# Patient Record
Sex: MALE | Race: ASIAN | NOT HISPANIC OR LATINO | Employment: FULL TIME | URBAN - METROPOLITAN AREA
[De-identification: names, ages, dates, MRNs, and addresses within clinical notes are randomized per-mention and may not be internally consistent; named-entity substitution may affect disease eponyms.]

---

## 2017-08-01 DIAGNOSIS — B17.0: ICD-10-CM

## 2017-08-01 LAB
AFP SERPL-MCNC: 7.4 UG/L (ref 0–8)
BASOPHILS # BLD AUTO: 0 10E9/L (ref 0–0.2)
BASOPHILS NFR BLD AUTO: 0.5 %
DIFFERENTIAL METHOD BLD: ABNORMAL
EOSINOPHIL # BLD AUTO: 0.1 10E9/L (ref 0–0.7)
EOSINOPHIL NFR BLD AUTO: 2.8 %
ERYTHROCYTE [DISTWIDTH] IN BLOOD BY AUTOMATED COUNT: 12.4 % (ref 10–15)
HCT VFR BLD AUTO: 36.8 % (ref 40–53)
HGB BLD-MCNC: 13.1 G/DL (ref 13.3–17.7)
INR PPP: 1.07 (ref 0.86–1.14)
LYMPHOCYTES # BLD AUTO: 1 10E9/L (ref 0.8–5.3)
LYMPHOCYTES NFR BLD AUTO: 23.6 %
MCH RBC QN AUTO: 32.2 PG (ref 26.5–33)
MCHC RBC AUTO-ENTMCNC: 35.6 G/DL (ref 31.5–36.5)
MCV RBC AUTO: 90 FL (ref 78–100)
MONOCYTES # BLD AUTO: 0.4 10E9/L (ref 0–1.3)
MONOCYTES NFR BLD AUTO: 8.9 %
NEUTROPHILS # BLD AUTO: 2.8 10E9/L (ref 1.6–8.3)
NEUTROPHILS NFR BLD AUTO: 64.2 %
PLATELET # BLD AUTO: 149 10E9/L (ref 150–450)
RBC # BLD AUTO: 4.07 10E12/L (ref 4.4–5.9)
WBC # BLD AUTO: 4.3 10E9/L (ref 4–11)

## 2017-08-01 PROCEDURE — 82105 ALPHA-FETOPROTEIN SERUM: CPT | Performed by: INTERNAL MEDICINE

## 2017-08-01 PROCEDURE — 85610 PROTHROMBIN TIME: CPT | Performed by: INTERNAL MEDICINE

## 2017-08-01 PROCEDURE — 80076 HEPATIC FUNCTION PANEL: CPT | Performed by: INTERNAL MEDICINE

## 2017-08-01 PROCEDURE — 36415 COLL VENOUS BLD VENIPUNCTURE: CPT | Performed by: INTERNAL MEDICINE

## 2017-08-01 PROCEDURE — 85025 COMPLETE CBC W/AUTO DIFF WBC: CPT | Performed by: INTERNAL MEDICINE

## 2017-08-01 PROCEDURE — 80048 BASIC METABOLIC PNL TOTAL CA: CPT | Performed by: INTERNAL MEDICINE

## 2017-08-01 PROCEDURE — 87517 HEPATITIS B DNA QUANT: CPT | Performed by: INTERNAL MEDICINE

## 2017-08-02 LAB
ALBUMIN SERPL-MCNC: 4 G/DL (ref 3.4–5)
ALP SERPL-CCNC: 66 U/L (ref 40–150)
ALT SERPL W P-5'-P-CCNC: 30 U/L (ref 0–70)
ANION GAP SERPL CALCULATED.3IONS-SCNC: 7 MMOL/L (ref 3–14)
AST SERPL W P-5'-P-CCNC: 19 U/L (ref 0–45)
BILIRUB DIRECT SERPL-MCNC: 0.2 MG/DL (ref 0–0.2)
BILIRUB SERPL-MCNC: 1.2 MG/DL (ref 0.2–1.3)
BUN SERPL-MCNC: 15 MG/DL (ref 7–30)
CALCIUM SERPL-MCNC: 8.6 MG/DL (ref 8.5–10.1)
CHLORIDE SERPL-SCNC: 107 MMOL/L (ref 94–109)
CO2 SERPL-SCNC: 27 MMOL/L (ref 20–32)
CREAT SERPL-MCNC: 0.92 MG/DL (ref 0.66–1.25)
GFR SERPL CREATININE-BSD FRML MDRD: 84 ML/MIN/1.7M2
GLUCOSE SERPL-MCNC: 101 MG/DL (ref 70–99)
POTASSIUM SERPL-SCNC: 3.3 MMOL/L (ref 3.4–5.3)
PROT SERPL-MCNC: 7.2 G/DL (ref 6.8–8.8)
SODIUM SERPL-SCNC: 141 MMOL/L (ref 133–144)

## 2017-08-04 ENCOUNTER — HOSPITAL ENCOUNTER (OUTPATIENT)
Dept: ULTRASOUND IMAGING | Facility: CLINIC | Age: 62
Discharge: HOME | End: 2017-08-04
Attending: NURSE PRACTITIONER | Admitting: NURSE PRACTITIONER
Payer: COMMERCIAL

## 2017-08-04 DIAGNOSIS — B19.10 VIRAL HEPATITIS B WITHOUT HEPATIC COMA: ICD-10-CM

## 2017-08-04 LAB
HBV DNA SERPL NAA+PROBE-ACNC: 318 [IU]/ML
HBV DNA SERPL NAA+PROBE-LOG IU: 2.5 {LOG_IU}/ML

## 2017-08-04 PROCEDURE — 76700 US EXAM ABDOM COMPLETE: CPT

## 2017-10-09 ENCOUNTER — OFFICE VISIT (OUTPATIENT)
Dept: OPTOMETRY | Facility: CLINIC | Age: 62
End: 2017-10-09
Payer: COMMERCIAL

## 2017-10-09 ENCOUNTER — MEDICAL CORRESPONDENCE (OUTPATIENT)
Dept: HEALTH INFORMATION MANAGEMENT | Facility: CLINIC | Age: 62
End: 2017-10-09

## 2017-10-09 DIAGNOSIS — H02.836 DERMATOCHALASIS OF BOTH EYELIDS: Primary | ICD-10-CM

## 2017-10-09 DIAGNOSIS — H52.4 PRESBYOPIA: ICD-10-CM

## 2017-10-09 DIAGNOSIS — H02.833 DERMATOCHALASIS OF BOTH EYELIDS: Primary | ICD-10-CM

## 2017-10-09 DIAGNOSIS — H25.13 NUCLEAR SCLEROSIS OF BOTH EYES: ICD-10-CM

## 2017-10-09 DIAGNOSIS — H52.203 HYPEROPIA OF BOTH EYES WITH ASTIGMATISM: ICD-10-CM

## 2017-10-09 DIAGNOSIS — H52.03 HYPEROPIA OF BOTH EYES WITH ASTIGMATISM: ICD-10-CM

## 2017-10-09 PROBLEM — M19.90 OSTEOARTHROSIS: Status: ACTIVE | Noted: 2017-10-09

## 2017-10-09 PROBLEM — N40.0 BENIGN PROSTATIC HYPERPLASIA: Status: ACTIVE | Noted: 2017-10-09

## 2017-10-09 PROBLEM — K21.9 ESOPHAGEAL REFLUX: Status: ACTIVE | Noted: 2017-10-09

## 2017-10-09 PROBLEM — K75.9 INFLAMMATORY LIVER DISEASE: Status: ACTIVE | Noted: 2017-10-09

## 2017-10-09 PROBLEM — E78.5 HYPERLIPIDEMIA: Status: ACTIVE | Noted: 2017-10-09

## 2017-10-09 PROBLEM — J18.9 PNEUMONIA: Status: ACTIVE | Noted: 2017-10-09

## 2017-10-09 PROBLEM — R39.15 URGENCY OF URINATION: Status: ACTIVE | Noted: 2017-10-09

## 2017-10-09 PROCEDURE — 92004 COMPRE OPH EXAM NEW PT 1/>: CPT | Performed by: OPTOMETRIST

## 2017-10-09 PROCEDURE — 92015 DETERMINE REFRACTIVE STATE: CPT | Performed by: OPTOMETRIST

## 2017-10-09 RX ORDER — TENOFOVIR DISOPROXIL FUMARATE 300 MG/1
300 TABLET, FILM COATED ORAL
COMMUNITY
Start: 2017-04-11 | End: 2018-11-28

## 2017-10-09 RX ORDER — TENOFOVIR DISOPROXIL FUMARATE 300 MG
TABLET ORAL
COMMUNITY
Start: 2017-09-21 | End: 2017-10-09

## 2017-10-09 ASSESSMENT — EXTERNAL EXAM - LEFT EYE: OS_EXAM: NORMAL

## 2017-10-09 ASSESSMENT — TONOMETRY
OD_IOP_MMHG: 17
IOP_METHOD: APPLANATION
OS_IOP_MMHG: 17

## 2017-10-09 ASSESSMENT — REFRACTION_MANIFEST
OD_SPHERE: +1.50
OS_SPHERE: +2.50
OS_AXIS: 090
OD_ADD: +2.50
OS_ADD: +2.50
OD_AXIS: 057
OD_CYLINDER: +0.25
OD_CYLINDER: +0.75
OD_AXIS: 165
OS_CYLINDER: +0.50
OS_SPHERE: +0.50
OS_CYLINDER: +0.50
METHOD_AUTOREFRACTION: 1
OD_SPHERE: +1.25
OS_AXIS: 91

## 2017-10-09 ASSESSMENT — REFRACTION_WEARINGRX
OD_SPHERE: +1.50
OS_SPHERE: +1.50
SPECS_TYPE: OTC

## 2017-10-09 ASSESSMENT — VISUAL ACUITY
OD_SC+: +2
OD_SC: 20/40
OD_SC: 20/40
OS_SC: 20/40
OS_SC: 20/40
METHOD: SNELLEN - LINEAR

## 2017-10-09 ASSESSMENT — CONF VISUAL FIELD
OD_NORMAL: 1
OS_NORMAL: 1

## 2017-10-09 ASSESSMENT — CUP TO DISC RATIO
OD_RATIO: 0.55
OS_RATIO: 0.5

## 2017-10-09 ASSESSMENT — EXTERNAL EXAM - RIGHT EYE: OD_EXAM: NORMAL

## 2017-10-09 NOTE — PROGRESS NOTES
Chief Complaint   Patient presents with     COMPREHENSIVE EYE EXAM     OD Eyelid drooping, dry and itchy eyes      Got a bifocal after last visit and did not wear, line was in his view  Not wearing any correction for distance and minimal near power  Eyelids are droopy especially right eye , he feels they are interfering with vision   Last Eye Exam: 1 yr  Dilated Previously: Yes    What are you currently using to see?  does not use glasses or contacts       Distance Vision Acuity: Satisfied with vision    Near Vision Acuity: Satisfied with vision while reading  with readers    Eye Comfort: dry  Do you use eye drops? : No  Occupation or Hobbies: Computer              Medical, surgical and family histories reviewed and updated 10/9/2017.       OBJECTIVE: See Ophthalmology exam    ASSESSMENT:    ICD-10-CM    1. Dermatochalasis of both eyelids H02.833 EYE EXAM (SIMPLE-NONBILLABLE)    H02.836 OPHTHALMOLOGY ADULT REFERRAL   2. Hyperopia of both eyes with astigmatism H52.03 REFRACTION    H52.203 EYE EXAM (SIMPLE-NONBILLABLE)   3. Presbyopia H52.4 REFRACTION   4. Nuclear sclerosis of both eyes H25.13 EYE EXAM (SIMPLE-NONBILLABLE)    lid position is pretty symmetrical     PLAN:   Refer to [facility] for blepharoplasty consult  Consider distance only prescription, if does not like bifocal     Mildred Wadsworth OD

## 2017-10-09 NOTE — MR AVS SNAPSHOT
After Visit Summary   10/9/2017    Sadie Olea    MRN: [id number]           Patient Information     Date Of Birth          1955        Visit Information        Provider Department      10/9/2017 9:40 AM Mildred Wadsworth OD [facility] [place]        Today's Diagnoses     Dermatochalasis of both eyelids    -  1    Hyperopia of both eyes with astigmatism        Presbyopia        Nuclear sclerosis of both eyes          Care Instructions    [facility]  [address] [place] [address]  [phone]      Make appointment for blepharoplasty consult  Consider distance only prescription for driving if you do not like the bifocal          Follow-ups after your visit        Additional Services     OPHTHALMOLOGY ADULT REFERRAL       Your provider has referred you to:  N: [facility] RAJENDRA [place] [phone]   [email or web address]      Please be aware that coverage of these services is subject to the terms and limitations of your health insurance plan.  Call member services at your health plan with any benefit or coverage questions.      Please bring the following to your appointment:  >>   Any x-rays, CTs or MRIs which have been performed.  Contact the facility where they were done to arrange for  prior to your scheduled appointment.  Any new CT, MRI or other procedures ordered by your specialist must be performed at a [facility] facility or coordinated by your clinic's referral office.    >>   List of current medications   >>   This referral request   >>   Any documents/labs given to you for this referral                  Follow-up notes from your care team     Return in about 1 year (around 10/9/2018) for Annual Visit.      Who to contact     If you have questions or need follow up information about today's clinic visit or your schedule please contact [facility] [place] directly at [phone].  Normal or non-critical lab and imaging  results will be communicated to you by MyChart, letter or phone within 4 business days after the clinic has received the results. If you do not hear from us within 7 days, please contact the clinic through [employer] or phone. If you have a critical or abnormal lab result, we will notify you by phone as soon as possible.  Submit refill requests through [employer] or call your pharmacy and they will forward the refill request to us. Please allow 3 business days for your refill to be completed.          Additional Information About Your Visit        [employer]arest[employer] Information     [employer] gives you secure access to your electronic health record. If you see a primary care provider, you can also send messages to your care team and make appointments. If you have questions, please call your primary care clinic.  If you do not have a primary care provider, please call [phone] and they will assist you.        Care EveryWhere ID     This is your Care EveryWhere ID. This could be used by other organizations to access your [facility] medical records  [id number]         Blood Pressure from Last 3 Encounters:   No data found for BP    Weight from Last 3 Encounters:   No data found for Wt              We Performed the Following     EYE EXAM (SIMPLE-NONBILLABLE)     OPHTHALMOLOGY ADULT REFERRAL     REFRACTION        Primary Care Provider Office Phone # Fax #    Jane Mcbride [phone]       [facility] [address]  [place] [address]        Equal Access to Services     MIRI XIAO : Hadii aad ku hadasho Soomaali, waaxda luqadaha, qaybta kaalmada adeegyada, waxyue maria. So [facility] [phone].    ATENCIÓN: Si habla español, tiene a christian disposición servicios gratuitos de asistencia lingüística. Llame al [phone].    We comply with applicable federal civil rights laws and [place] laws. We do not discriminate on the basis of race, color, national origin, age,  disability, sex, sexual orientation, or gender identity.            Thank you!     Thank you for choosing [facility] [place]  for your care. Our goal is always to provide you with excellent care. Hearing back from our patients is one way we can continue to improve our services. Please take a few minutes to complete the written survey that you may receive in the mail after your visit with us. Thank you!             Your Updated Medication List - Protect others around you: Learn how to safely use, store and throw away your medicines at www.disposemymeds.org.          This list is accurate as of: 10/9/17 10:18 AM.  Always use your most recent med list.                   Brand Name Dispense Instructions for use Diagnosis    tenofovir 300 MG tablet    VIREAD     Take 300 mg by mouth

## 2017-10-09 NOTE — PATIENT INSTRUCTIONS
[facility]  [address] [place] [address]  [phone]      Make appointment for blepharoplasty consult  Consider distance only prescription for driving if you do not like the bifocal

## 2017-10-12 ENCOUNTER — TELEPHONE (OUTPATIENT)
Dept: OPTOMETRY | Facility: CLINIC | Age: 62
End: 2017-10-12

## 2017-10-12 NOTE — TELEPHONE ENCOUNTER
Reason for call:  Other   Patient called regarding (reason for call): call back  Additional comments: patient spoke with his insurance provider and the insurance company needs the codes to verify if he will be covered or not by his insurance for the dermatochalasis      Phone number to reach patient:  Home number on file [phone] (home)    Best Time:  any    Can we leave a detailed message on this number?  YES

## 2018-02-20 DIAGNOSIS — B18.1 CHRONIC HEPATITIS B WITHOUT DELTA AGENT WITHOUT HEPATIC COMA (H): ICD-10-CM

## 2018-02-20 LAB
AFP SERPL-MCNC: 7 UG/L (ref 0–8)
APTT PPP: 27 SEC (ref 22–37)
ERYTHROCYTE [DISTWIDTH] IN BLOOD BY AUTOMATED COUNT: 12.7 % (ref 10–15)
HCT VFR BLD AUTO: 35.5 % (ref 40–53)
HGB BLD-MCNC: 12.5 G/DL (ref 13.3–17.7)
INR PPP: 0.99 (ref 0.86–1.14)
MCH RBC QN AUTO: 32.3 PG (ref 26.5–33)
MCHC RBC AUTO-ENTMCNC: 35.2 G/DL (ref 31.5–36.5)
MCV RBC AUTO: 92 FL (ref 78–100)
PLATELET # BLD AUTO: 142 10E9/L (ref 150–450)
RBC # BLD AUTO: 3.87 10E12/L (ref 4.4–5.9)
WBC # BLD AUTO: 5.2 10E9/L (ref 4–11)

## 2018-02-20 PROCEDURE — 82105 ALPHA-FETOPROTEIN SERUM: CPT | Performed by: INTERNAL MEDICINE

## 2018-02-20 PROCEDURE — 85610 PROTHROMBIN TIME: CPT | Performed by: INTERNAL MEDICINE

## 2018-02-20 PROCEDURE — 80076 HEPATIC FUNCTION PANEL: CPT | Performed by: INTERNAL MEDICINE

## 2018-02-20 PROCEDURE — 85027 COMPLETE CBC AUTOMATED: CPT | Performed by: INTERNAL MEDICINE

## 2018-02-20 PROCEDURE — 87517 HEPATITIS B DNA QUANT: CPT | Performed by: INTERNAL MEDICINE

## 2018-02-20 PROCEDURE — 85730 THROMBOPLASTIN TIME PARTIAL: CPT | Performed by: INTERNAL MEDICINE

## 2018-02-20 PROCEDURE — 36415 COLL VENOUS BLD VENIPUNCTURE: CPT | Performed by: INTERNAL MEDICINE

## 2018-02-21 LAB
ALBUMIN SERPL-MCNC: 3.7 G/DL (ref 3.4–5)
ALP SERPL-CCNC: 49 U/L (ref 40–150)
ALT SERPL W P-5'-P-CCNC: 27 U/L (ref 0–70)
AST SERPL W P-5'-P-CCNC: 19 U/L (ref 0–45)
BILIRUB DIRECT SERPL-MCNC: 0.2 MG/DL (ref 0–0.2)
BILIRUB SERPL-MCNC: 0.7 MG/DL (ref 0.2–1.3)
PROT SERPL-MCNC: 7 G/DL (ref 6.8–8.8)

## 2018-02-23 LAB
HBV DNA SERPL NAA+PROBE-ACNC: 1966 [IU]/ML
HBV DNA SERPL NAA+PROBE-LOG IU: 3.3 {LOG_IU}/ML

## 2018-05-24 ENCOUNTER — HOSPITAL ENCOUNTER (OUTPATIENT)
Dept: ULTRASOUND IMAGING | Facility: CLINIC | Age: 63
Discharge: HOME | End: 2018-05-24
Attending: NURSE PRACTITIONER | Admitting: NURSE PRACTITIONER
Payer: COMMERCIAL

## 2018-05-24 DIAGNOSIS — B18.1 CHRONIC VIRAL HEPATITIS B WITHOUT DELTA AGENT AND WITHOUT COMA (H): ICD-10-CM

## 2018-05-24 PROCEDURE — 76700 US EXAM ABDOM COMPLETE: CPT

## 2018-05-29 ENCOUNTER — NURSE TRIAGE (OUTPATIENT)
Dept: NURSING | Facility: CLINIC | Age: 63
End: 2018-05-29

## 2018-05-29 NOTE — TELEPHONE ENCOUNTER
Yulisa states that he is having difficulty getting his ultrasound results from 05/24/18 exam at [facility]. Reviewed in epic and not yet signed off, he has appointment tomorrow with provider and will ask him for the results and copy, he has tried my chart number but they referred him to [facility] twice and it appears Madhuri is inactive. Offered HIM phone number for [facility]. He agreed to check with provider tomorrow.  No triage needs.    Olga Vega, RN, BSN  [facility]

## 2018-07-20 ENCOUNTER — HOSPITAL ENCOUNTER (OUTPATIENT)
Dept: MRI IMAGING | Facility: CLINIC | Age: 63
Discharge: HOME | End: 2018-07-20
Attending: NURSE PRACTITIONER | Admitting: NURSE PRACTITIONER
Payer: COMMERCIAL

## 2018-07-20 DIAGNOSIS — B18.1 CHRONIC VIRAL HEPATITIS B WITHOUT DELTA AGENT AND WITHOUT COMA (H): ICD-10-CM

## 2018-07-20 DIAGNOSIS — B19.10 HEPATITIS B VIRUS INFECTION: Primary | ICD-10-CM

## 2018-07-20 PROCEDURE — 74183 MRI ABD W/O CNTR FLWD CNTR: CPT

## 2018-07-20 PROCEDURE — 25000128 H RX IP 250 OP 636: Performed by: RADIOLOGY

## 2018-07-20 PROCEDURE — A9585 GADOBUTROL INJECTION: HCPCS | Performed by: RADIOLOGY

## 2018-07-20 RX ORDER — GADOBUTROL 604.72 MG/ML
10 INJECTION INTRAVENOUS ONCE
Status: COMPLETED | OUTPATIENT
Start: 2018-07-20 | End: 2018-07-20

## 2018-07-20 RX ADMIN — GADOBUTROL 10 ML: 604.72 INJECTION INTRAVENOUS at 11:00

## 2018-08-14 DIAGNOSIS — B19.10 HEPATITIS B VIRUS INFECTION: ICD-10-CM

## 2018-08-14 LAB
ERYTHROCYTE [DISTWIDTH] IN BLOOD BY AUTOMATED COUNT: 12.6 % (ref 10–15)
HCT VFR BLD AUTO: 35 % (ref 40–53)
HGB BLD-MCNC: 12.5 G/DL (ref 13.3–17.7)
INR PPP: 1.05 (ref 0.86–1.14)
MCH RBC QN AUTO: 32.7 PG (ref 26.5–33)
MCHC RBC AUTO-ENTMCNC: 35.7 G/DL (ref 31.5–36.5)
MCV RBC AUTO: 92 FL (ref 78–100)
PLATELET # BLD AUTO: 158 10E9/L (ref 150–450)
RBC # BLD AUTO: 3.82 10E12/L (ref 4.4–5.9)
WBC # BLD AUTO: 4.3 10E9/L (ref 4–11)

## 2018-08-14 PROCEDURE — 36415 COLL VENOUS BLD VENIPUNCTURE: CPT | Performed by: INTERNAL MEDICINE

## 2018-08-14 PROCEDURE — 87517 HEPATITIS B DNA QUANT: CPT | Performed by: INTERNAL MEDICINE

## 2018-08-14 PROCEDURE — 80076 HEPATIC FUNCTION PANEL: CPT | Performed by: INTERNAL MEDICINE

## 2018-08-14 PROCEDURE — 85027 COMPLETE CBC AUTOMATED: CPT | Performed by: INTERNAL MEDICINE

## 2018-08-14 PROCEDURE — 85610 PROTHROMBIN TIME: CPT | Performed by: INTERNAL MEDICINE

## 2018-08-14 PROCEDURE — 80048 BASIC METABOLIC PNL TOTAL CA: CPT | Performed by: INTERNAL MEDICINE

## 2018-08-15 LAB
ALBUMIN SERPL-MCNC: 3.9 G/DL (ref 3.4–5)
ALP SERPL-CCNC: 39 U/L (ref 40–150)
ALT SERPL W P-5'-P-CCNC: 48 U/L (ref 0–70)
ANION GAP SERPL CALCULATED.3IONS-SCNC: 7 MMOL/L (ref 3–14)
AST SERPL W P-5'-P-CCNC: 28 U/L (ref 0–45)
BILIRUB DIRECT SERPL-MCNC: 0.2 MG/DL (ref 0–0.2)
BILIRUB SERPL-MCNC: 1 MG/DL (ref 0.2–1.3)
BUN SERPL-MCNC: 14 MG/DL (ref 7–30)
CALCIUM SERPL-MCNC: 8.4 MG/DL (ref 8.5–10.1)
CHLORIDE SERPL-SCNC: 108 MMOL/L (ref 94–109)
CO2 SERPL-SCNC: 27 MMOL/L (ref 20–32)
CREAT SERPL-MCNC: 0.79 MG/DL (ref 0.66–1.25)
GFR SERPL CREATININE-BSD FRML MDRD: >90 ML/MIN/1.7M2
GLUCOSE SERPL-MCNC: 129 MG/DL (ref 70–99)
POTASSIUM SERPL-SCNC: 3.6 MMOL/L (ref 3.4–5.3)
PROT SERPL-MCNC: 6.9 G/DL (ref 6.8–8.8)
SODIUM SERPL-SCNC: 142 MMOL/L (ref 133–144)

## 2018-08-17 LAB
HBV DNA SERPL NAA+PROBE-ACNC: 1137 [IU]/ML
HBV DNA SERPL NAA+PROBE-LOG IU: 3.1 {LOG_IU}/ML

## 2018-08-21 ENCOUNTER — TELEPHONE (OUTPATIENT)
Dept: GASTROENTEROLOGY | Facility: CLINIC | Age: 63
End: 2018-08-21

## 2018-08-21 NOTE — TELEPHONE ENCOUNTER
Patient contacted and reminded of upcoming appointment.  Patient confirmed they will be attending.  Patient instructed to bring updated medications list to appointment.  Instructed pt to arrive an hour and a half to two hours prior to appt time for labs.  Paul Pablo, CMA

## 2018-08-22 ENCOUNTER — OFFICE VISIT (OUTPATIENT)
Dept: GASTROENTEROLOGY | Facility: CLINIC | Age: 63
End: 2018-08-22
Attending: INTERNAL MEDICINE
Payer: COMMERCIAL

## 2018-08-22 VITALS
HEIGHT: 70 IN | SYSTOLIC BLOOD PRESSURE: 139 MMHG | OXYGEN SATURATION: 97 % | BODY MASS INDEX: 24.91 KG/M2 | WEIGHT: 174 LBS | TEMPERATURE: 97.7 F | HEART RATE: 58 BPM | DIASTOLIC BLOOD PRESSURE: 86 MMHG

## 2018-08-22 DIAGNOSIS — Z12.11 SPECIAL SCREENING FOR MALIGNANT NEOPLASMS, COLON: ICD-10-CM

## 2018-08-22 DIAGNOSIS — B18.1 CHRONIC VIRAL HEPATITIS B WITHOUT DELTA AGENT AND WITHOUT COMA (H): Primary | ICD-10-CM

## 2018-08-22 DIAGNOSIS — B18.1 CHRONIC VIRAL HEPATITIS B WITHOUT DELTA AGENT AND WITHOUT COMA (H): ICD-10-CM

## 2018-08-22 LAB — AFP SERPL-MCNC: 11.1 UG/L (ref 0–8)

## 2018-08-22 PROCEDURE — G0463 HOSPITAL OUTPT CLINIC VISIT: HCPCS | Mod: ZF

## 2018-08-22 PROCEDURE — 86707 HEPATITIS BE ANTIBODY: CPT | Mod: 90 | Performed by: INTERNAL MEDICINE

## 2018-08-22 PROCEDURE — 99000 SPECIMEN HANDLING OFFICE-LAB: CPT | Performed by: INTERNAL MEDICINE

## 2018-08-22 PROCEDURE — 82105 ALPHA-FETOPROTEIN SERUM: CPT | Performed by: INTERNAL MEDICINE

## 2018-08-22 PROCEDURE — 36415 COLL VENOUS BLD VENIPUNCTURE: CPT | Performed by: INTERNAL MEDICINE

## 2018-08-22 PROCEDURE — 87350 HEPATITIS BE AG IA: CPT | Mod: 90 | Performed by: INTERNAL MEDICINE

## 2018-08-22 ASSESSMENT — PAIN SCALES - GENERAL: PAINLEVEL: NO PAIN (0)

## 2018-08-22 NOTE — NURSING NOTE
Chief Complaint   Patient presents with     Consult     consult chronic viral hep B     Meg Del Cid MA

## 2018-08-22 NOTE — MR AVS SNAPSHOT
After Visit Summary   8/22/2018    Sadie Olea    MRN: [id number]           Patient Information     Date Of Birth          1955        Visit Information        Provider Department      8/22/2018 9:00 AM Nehemias Potts MD [facility]        Today's Diagnoses     Chronic viral hepatitis B without delta agent and without coma (H)    -  1    Special screening for malignant neoplasms, colon           Follow-ups after your visit        Follow-up notes from your care team     Return in about 3 months (around 11/22/2018).      Your next 10 appointments already scheduled     Feb 27, 2019 10:00 AM CST   (Arrive by 9:45 AM)   Return Visit with Nehemias Potts MD   [facility] ([facility] [place])    [address] [place] [address]  [address] [place] [id number] [phone]              Future tests that were ordered for you today     Open Future Orders        Priority Expected Expires Ordered    AFP tumor marker Routine  9/21/2018 8/22/2018    Hepatitis Be antibody Routine  9/21/2018 8/22/2018    Hepatitis Be antigen Routine  9/21/2018 8/22/2018            Who to contact     If you have questions or need follow up information about today's clinic visit or your schedule please contact [facility] directly at [phone].  Normal or non-critical lab and imaging results will be communicated to you by MyChart, letter or phone within 4 business days after the clinic has received the results. If you do not hear from us within 7 days, please contact the clinic through MyChart or phone. If you have a critical or abnormal lab result, we will notify you by phone as soon as possible.  Submit refill requests through [employer] or call your pharmacy and they will forward the refill request to us. Please allow 3 business days for your refill to be completed.          Additional Information About Your Visit        Nearboxhart Information     [employer] gives you  "secure access to your electronic health record. If you see a primary care provider, you can also send messages to your care team and make appointments. If you have questions, please call your primary care clinic.  If you do not have a primary care provider, please call [phone] and they will assist you.        Care EveryWhere ID     This is your Care EveryWhere ID. This could be used by other organizations to access your [facility] medical records  [id number]        Your Vitals Were     Pulse Temperature Height Pulse Oximetry BMI (Body Mass Index)       58 97.7  F (36.5  C) (Oral) 1.778 m (5' 10\") 97% 24.97 kg/m2        Blood Pressure from Last 3 Encounters:   08/22/18 139/86    Weight from Last 3 Encounters:   08/22/18 78.9 kg (174 lb)              We Performed the Following     C COLONOSCOPY, FLEX,  W ABLATION TUMOR/POLYP/LESION        Primary Care Provider Office Phone # Fax #    Jane Mcbride [phone]       [facility] [address]  [facility] [address]        Equal Access to Services     [facility]: Hadii aad ku hadasho Soomaali, waaxda luqadaha, qaybta kaalmada adeegyada, waxay demarin fidelina tim . So [facility] [phone].    ATENCIÓN: Si habla español, tiene a christian disposición servicios gratuitos de asistencia lingüística. [facility] [phone].    We comply with applicable federal civil rights laws and [place] laws. We do not discriminate on the basis of race, color, national origin, age, disability, sex, sexual orientation, or gender identity.            Thank you!     Thank you for choosing [facility]  for your care. Our goal is always to provide you with excellent care. Hearing back from our patients is one way we can continue to improve our services. Please take a few minutes to complete the written survey that you may receive in the mail after your visit with us. Thank you!             Your Updated Medication List - Protect others " around you: Learn how to safely use, store and throw away your medicines at www.disposemymeds.org.          This list is accurate as of 8/22/18 10:06 AM.  Always use your most recent med list.                   Brand Name Dispense Instructions for use Diagnosis    tenofovir 300 MG tablet    VIREAD     Take 300 mg by mouth

## 2018-08-22 NOTE — LETTER
8/22/2018      RE: Sadie Olea  [address]  [place] [address]       [facility]    Hepatology New Patient Visit    Referring provider:  Netta Kincaid    CHIEF COMPLAINT/REASON FOR VISIT:  Hepatitis B virus infection.      HISTORY OF PRESENT ILLNESS:  Mr. 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  He was previously followed and seen at [facility].  He was diagnosed with hepatitis B in 2009.  At that time he was not started on any treatment, as he was thought to be in the inactive phase of the disease.  Please note that the patient admitted that at age 42 back in [place], he did have jaundice.  He does not show any signs of chronic liver disease like edema or fluid retention.  Weight is stable.  He never had any GI bleed nor does he have any signs of confusion or memory issues.  He also denies any abdominal pain, nausea or vomiting.  He is moving his bowels regularly.  He never had a colonoscopy.     Medical hx Surgical hx   Past Medical History:   Diagnosis Date     GERD (gastroesophageal reflux disease)      HBV (hepatitis B virus) infection      Hyperlipidemia       No past surgical history on file.       Medications  Prior to Admission medications    Medication Sig Start Date End Date Taking? Authorizing Provider   tenofovir (VIREAD) 300 MG tablet Take 300 mg by mouth 4/11/17   Reported, Patient       Allergies  No Known Allergies    Family hx Social hx   Family History   Problem Relation Age of Onset     Glaucoma Father      Type 2 Diabetes Mother      Hepatitis Sister      Macular Degeneration No family hx of       Social History   Substance Use Topics     Smoking status: Never Smoker     Smokeless tobacco: Never Used     Alcohol use Not on file     He is  and has 5 children, who are 4 girls and 1 boy who are between 34-25 years of age.          REVIEW OF SYSTEMS:  Mr. Olea denies any recent infections.  Does not have  "any fatigue, headaches or seizures.  He denies any cough, shortness of breath or dyspnea on exertion.  No anemia or easy bruising.  He has no diabetes or thyroid issues.  He denies also any psychiatric problems.  He does not have any issues with hypothyroidism or diabetes.  He does not have any eye, hearing or skin issues.  Otherwise, a comprehensive review of systems was noncontributory.       Examination  /86  Pulse 58  Temp 97.7  F (36.5  C) (Oral)  Ht 1.778 m (5' 10\")  Wt 78.9 kg (174 lb)  SpO2 97%  BMI 24.97 kg/m2  Body mass index is 24.97 kg/(m^2).    Gen- well, NAD, A+Ox3, normal color  Eye- EOMI  ENT- MMM, normal oropharynx  Lym- no palpable lymphadenopathy  CVS- S1, S2 normal, no added sounds, RRR  RS- CTA  Abd- Not distended. No hepatosplenomegaly.  Extr- pulses good, no HECTOR  MS- hands normal- no clubbing  Neuro- A+Ox3, no asterixis  Skin- no rash or jaundice  Psych- normal mood    Laboratory  Lab Results   Component Value Date     08/14/2018    POTASSIUM 3.6 08/14/2018    CHLORIDE 108 08/14/2018    CO2 27 08/14/2018    BUN 14 08/14/2018    CR 0.79 08/14/2018       Lab Results   Component Value Date    BILITOTAL 1.0 08/14/2018    ALT 48 08/14/2018    AST 28 08/14/2018    ALKPHOS 39 08/14/2018       Lab Results   Component Value Date    ALBUMIN 3.9 08/14/2018    PROTTOTAL 6.9 08/14/2018        Lab Results   Component Value Date    WBC 4.3 08/14/2018    HGB 12.5 08/14/2018    MCV 92 08/14/2018     08/14/2018       Lab Results   Component Value Date    INR 1.05 08/14/2018         Radiology    Assessment and Plan:  Mr. Olea is a 63-year-old Tibetan immigrant with hepatitis B virus infection.  He did get diagnosed, according to him, in 2009, at which time looking back at notes from [facility], he did have a high viral load with E antigen positivity, and he was thought to be in the immune tolerant phase of the disease.  He was started on tenofovir; I am not quite sure about his " compliance as he has detectable virus now.  He does not show any signs of chronic liver disease.  He moved his health care as his insurance is acceptable to [place], but not to [facility], so he will be here from now on.  We will get an abdominal ultrasound, and we repeated his labs.  We will get, also, an E antibody, and we will get his alpha fetoproteins.  He had recently done an MRI and MRCP, which showed mild intra- and extrahepatic biliary dilation with no definite causes, and there was no choledocholithiasis.  At that point, his liver was also read as unremarkable, so he will be seen here in 6 months.  He will follow with his Primary Care physician for his other medical issues.  We will send him, also, for a colonoscopy.      This was a 45 minute visit, of which more than 50% was spent in explaining to the patient what our plan of care was.  We answered all his questions.      Nehemias Potts MD  Hepatology  [facility]      cc:   Jane Mcbride MD   [place] [phone]

## 2018-08-22 NOTE — PROGRESS NOTES
[facility]    Hepatology New Patient Visit    Referring provider:  Netta Kincaid    CHIEF COMPLAINT/REASON FOR VISIT:  Hepatitis B virus infection.      HISTORY OF PRESENT ILLNESS:  Mr. 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  He was previously followed and seen at [facility].  He was diagnosed with hepatitis B in 2009.  At that time he was not started on any treatment, as he was thought to be in the inactive phase of the disease.  Please note that the patient admitted that at age 42 back in [place], he did have jaundice.  He does not show any signs of chronic liver disease like edema or fluid retention.  Weight is stable.  He never had any GI bleed nor does he have any signs of confusion or memory issues.  He also denies any abdominal pain, nausea or vomiting.  He is moving his bowels regularly.  He never had a colonoscopy.     Medical hx Surgical hx   Past Medical History:   Diagnosis Date     GERD (gastroesophageal reflux disease)      HBV (hepatitis B virus) infection      Hyperlipidemia       No past surgical history on file.       Medications  Prior to Admission medications    Medication Sig Start Date End Date Taking? Authorizing Provider   tenofovir (VIREAD) 300 MG tablet Take 300 mg by mouth 4/11/17   Reported, Patient       Allergies  No Known Allergies    Family hx Social hx   Family History   Problem Relation Age of Onset     Glaucoma Father      Type 2 Diabetes Mother      Hepatitis Sister      Macular Degeneration No family hx of       Social History   Substance Use Topics     Smoking status: Never Smoker     Smokeless tobacco: Never Used     Alcohol use Not on file     He is  and has 5 children, who are 4 girls and 1 boy who are between 34-25 years of age.          REVIEW OF SYSTEMS:  Mr. Olea denies any recent infections.  Does not have any fatigue, headaches or seizures.  He denies any cough, shortness of breath  "or dyspnea on exertion.  No anemia or easy bruising.  He has no diabetes or thyroid issues.  He denies also any psychiatric problems.  He does not have any issues with hypothyroidism or diabetes.  He does not have any eye, hearing or skin issues.  Otherwise, a comprehensive review of systems was noncontributory.       Examination  /86  Pulse 58  Temp 97.7  F (36.5  C) (Oral)  Ht 1.778 m (5' 10\")  Wt 78.9 kg (174 lb)  SpO2 97%  BMI 24.97 kg/m2  Body mass index is 24.97 kg/(m^2).    Gen- well, NAD, A+Ox3, normal color  Eye- EOMI  ENT- MMM, normal oropharynx  Lym- no palpable lymphadenopathy  CVS- S1, S2 normal, no added sounds, RRR  RS- CTA  Abd- Not distended. No hepatosplenomegaly.  Extr- pulses good, no HECTOR  MS- hands normal- no clubbing  Neuro- A+Ox3, no asterixis  Skin- no rash or jaundice  Psych- normal mood    Laboratory  Lab Results   Component Value Date     08/14/2018    POTASSIUM 3.6 08/14/2018    CHLORIDE 108 08/14/2018    CO2 27 08/14/2018    BUN 14 08/14/2018    CR 0.79 08/14/2018       Lab Results   Component Value Date    BILITOTAL 1.0 08/14/2018    ALT 48 08/14/2018    AST 28 08/14/2018    ALKPHOS 39 08/14/2018       Lab Results   Component Value Date    ALBUMIN 3.9 08/14/2018    PROTTOTAL 6.9 08/14/2018        Lab Results   Component Value Date    WBC 4.3 08/14/2018    HGB 12.5 08/14/2018    MCV 92 08/14/2018     08/14/2018       Lab Results   Component Value Date    INR 1.05 08/14/2018         Radiology    Assessment and Plan:  Mr. Olea is a 63-year-old Tibetan immigrant with hepatitis B virus infection.  He did get diagnosed, according to him, in 2009, at which time looking back at notes from [facility], he did have a high viral load with E antigen positivity, and he was thought to be in the immune tolerant phase of the disease.  He was started on tenofovir; I am not quite sure about his compliance as he has detectable virus now.  He does not show any signs of " chronic liver disease.  He moved his health care as his insurance is acceptable to [place], so he will be here from now on.  We will get an abdominal ultrasound, and we repeated his labs.  We will get, also, an E antibody, and we will get his alpha fetoproteins.  He had recently done an MRI and MRCP, which showed mild intra- and extrahepatic biliary dilation with no definite causes, and there was no choledocholithiasis.  At that point, his liver was also read as unremarkable, so he will be seen here in 6 months.  He will follow with his Primary Care physician for his other medical issues.  We will send him, also, for a colonoscopy.      This was a 45 minute visit, of which more than 50% was spent in explaining to the patient what our plan of care was.  We answered all his questions.      cc:   Jane Mcbride MD   [facility]    [address] [place] [address]       Nehemias Potts MD  Hepatology  [facility]

## 2018-08-23 LAB
HBV E AB SERPL QL IA: NEGATIVE
HBV E AG SERPL QL IA: POSITIVE

## 2018-11-27 ENCOUNTER — TELEPHONE (OUTPATIENT)
Dept: GASTROENTEROLOGY | Facility: CLINIC | Age: 63
End: 2018-11-27

## 2018-11-27 DIAGNOSIS — B18.1 CHRONIC VIRAL HEPATITIS B WITHOUT DELTA AGENT AND WITHOUT COMA (H): Primary | ICD-10-CM

## 2018-11-27 NOTE — TELEPHONE ENCOUNTER
[facility] Call Center    Phone Message    May a detailed message be left on voicemail: yes    Reason for Call: Medication Question or concern regarding medication   Prescription Clarification  Name of Medication: Viread  Prescribing Provider: Delroy   Pharmacy:  mail order   What on the order needs clarification? Pt was on this med. Filled 4/11/17 but then changed to a [facility] who put him on something else. He is now requesting a new script for the Viread again per Estefania at  mail order because he has started to see Dr Potts again in Aug. Of 2018. You can call the pharmacy at [phone] if questions.           Action Taken: Message routed to:  [facility] ([facility] see above

## 2018-11-28 RX ORDER — TENOFOVIR DISOPROXIL FUMARATE 300 MG/1
300 TABLET, FILM COATED ORAL DAILY
Qty: 90 TABLET | Refills: 3 | Status: SHIPPED | OUTPATIENT
Start: 2018-11-28 | End: 2019-08-26

## 2018-12-18 DIAGNOSIS — B18.1 CHRONIC VIRAL HEPATITIS B WITHOUT DELTA AGENT AND WITHOUT COMA (H): Primary | ICD-10-CM

## 2018-12-23 ENCOUNTER — ANCILLARY PROCEDURE (OUTPATIENT)
Dept: GENERAL RADIOLOGY | Facility: CLINIC | Age: 63
End: 2018-12-23
Attending: FAMILY MEDICINE
Payer: COMMERCIAL

## 2018-12-23 ENCOUNTER — OFFICE VISIT (OUTPATIENT)
Dept: URGENT CARE | Facility: URGENT CARE | Age: 63
End: 2018-12-23
Payer: COMMERCIAL

## 2018-12-23 VITALS
SYSTOLIC BLOOD PRESSURE: 150 MMHG | HEART RATE: 56 BPM | RESPIRATION RATE: 12 BRPM | TEMPERATURE: 97.3 F | OXYGEN SATURATION: 100 % | DIASTOLIC BLOOD PRESSURE: 86 MMHG

## 2018-12-23 DIAGNOSIS — R07.89 CHEST HEAVINESS: ICD-10-CM

## 2018-12-23 DIAGNOSIS — B18.1 CHRONIC VIRAL HEPATITIS B WITHOUT DELTA AGENT AND WITHOUT COMA (H): ICD-10-CM

## 2018-12-23 DIAGNOSIS — R09.89 PHLEGM IN THROAT: ICD-10-CM

## 2018-12-23 DIAGNOSIS — R07.89 CHEST HEAVINESS: Primary | ICD-10-CM

## 2018-12-23 PROCEDURE — 99204 OFFICE O/P NEW MOD 45 MIN: CPT | Performed by: FAMILY MEDICINE

## 2018-12-23 PROCEDURE — 71046 X-RAY EXAM CHEST 2 VIEWS: CPT

## 2018-12-23 NOTE — PROGRESS NOTES
SUBJECTIVE:   Sadie Olea is a 63 year old male presenting with a chief complaint of chest congestion and large amounts of mucus (thick white mucus from the throat) Not much cough.  .No shortness of breath.    Onset of symptoms was three months ago.  Course of illness is about the same.  .      Current and Associated symptoms: no stuffy nose.    Treatment measures tried include Nyquil.  Predisposing factors include:  None.    Patient does not smoke.  .    Past Medical History:   Diagnosis Date     GERD (gastroesophageal reflux disease)      HBV (hepatitis B virus) infection      Hyperlipidemia      Current Outpatient Medications   Medication Sig Dispense Refill     tenofovir (VIREAD) 300 MG tablet Take 1 tablet (300 mg) by mouth daily 90 tablet 3     Social History     Tobacco Use     Smoking status: Never Smoker     Smokeless tobacco: Never Used   Substance Use Topics     Alcohol use: Not on file     Family History:  No major medical problems.       ROS:  CONSTITUTIONAL:NEGATIVE for fever, chills, change in weight  INTEGUMENTARY/SKIN: NEGATIVE for worrisome rashes, moles or lesions  EYES: NEGATIVE for vision changes or irritation  ENT/MOUTH: POSITIVE for phlegm   RESP:POSITIVE for chest heaviness.    CV: NEGATIVE for chest pain, palpitations or peripheral edema  GI: NEGATIVE for nausea, abdominal pain, heartburn, or change in bowel habits  : negative for dysuria, hematuria, decreased urinary stream, MUSCULOSKELETAL: NEGATIVE for significant arthralgias or myalgia  HEME/ALLERGY/IMMUNE: NEGATIVE for bleeding problems  PSYCHIATRIC: NEGATIVE for changes in mood or affect    OBJECTIVE:  /86   Pulse 56   Temp 97.3  F (36.3  C) (Tympanic)   Resp 12   SpO2 100%   GENERAL APPEARANCE: healthy, alert and no distress  HENT: ear canals and TM's normal.  Nose has moderate edema at the turbinates and mouth without ulcers, erythema or lesions  NECK: supple, nontender, no lymphadenopathy  RESP: lungs clear to  auscultation - no rales, rhonchi or wheezes  CV: regular rates and rhythm, normal S1 S2, no murmur noted  SKIN: no suspicious lesions or rashes    chest x-ray:  I viewed the chest x-ray images.  There is a small, nodule-like lesion in the right upper lobe region.  Radiology report is pending.     ASSESSMENT:  Chest heaviness  Phlegm, unsure if the source is from the lungs or from the sinuses.        PLAN:  Follow up with the primary care provider for further evaluation.  Rx:  Augmentin to cover for sinusitis as a cause of the phlegm.      Drink plenty of liquids    Take Mucinex to thin the phlegm    The radiology report is pending regarding the nodule-like lesion in the right upper lobe region.          Bill Torre MD

## 2018-12-26 DIAGNOSIS — B18.1 CHRONIC VIRAL HEPATITIS B WITHOUT DELTA AGENT AND WITHOUT COMA (H): ICD-10-CM

## 2018-12-26 LAB
ERYTHROCYTE [DISTWIDTH] IN BLOOD BY AUTOMATED COUNT: 12.6 % (ref 10–15)
HCT VFR BLD AUTO: 36.2 % (ref 40–53)
HGB BLD-MCNC: 13 G/DL (ref 13.3–17.7)
INR PPP: 1.07 (ref 0.86–1.14)
MCH RBC QN AUTO: 31.9 PG (ref 26.5–33)
MCHC RBC AUTO-ENTMCNC: 35.9 G/DL (ref 31.5–36.5)
MCV RBC AUTO: 89 FL (ref 78–100)
PLATELET # BLD AUTO: 149 10E9/L (ref 150–450)
RBC # BLD AUTO: 4.07 10E12/L (ref 4.4–5.9)
WBC # BLD AUTO: 4.4 10E9/L (ref 4–11)

## 2018-12-26 PROCEDURE — 85610 PROTHROMBIN TIME: CPT | Performed by: INTERNAL MEDICINE

## 2018-12-26 PROCEDURE — 80076 HEPATIC FUNCTION PANEL: CPT | Performed by: INTERNAL MEDICINE

## 2018-12-26 PROCEDURE — 87517 HEPATITIS B DNA QUANT: CPT | Performed by: INTERNAL MEDICINE

## 2018-12-26 PROCEDURE — 80048 BASIC METABOLIC PNL TOTAL CA: CPT | Performed by: INTERNAL MEDICINE

## 2018-12-26 PROCEDURE — 85027 COMPLETE CBC AUTOMATED: CPT | Performed by: INTERNAL MEDICINE

## 2018-12-26 PROCEDURE — 36415 COLL VENOUS BLD VENIPUNCTURE: CPT | Performed by: INTERNAL MEDICINE

## 2018-12-27 LAB
ALBUMIN SERPL-MCNC: 3.8 G/DL (ref 3.4–5)
ALP SERPL-CCNC: 41 U/L (ref 40–150)
ALT SERPL W P-5'-P-CCNC: 43 U/L (ref 0–70)
ANION GAP SERPL CALCULATED.3IONS-SCNC: 6 MMOL/L (ref 3–14)
AST SERPL W P-5'-P-CCNC: 25 U/L (ref 0–45)
BILIRUB DIRECT SERPL-MCNC: 0.2 MG/DL (ref 0–0.2)
BILIRUB SERPL-MCNC: 0.8 MG/DL (ref 0.2–1.3)
BUN SERPL-MCNC: 9 MG/DL (ref 7–30)
CALCIUM SERPL-MCNC: 8.7 MG/DL (ref 8.5–10.1)
CHLORIDE SERPL-SCNC: 106 MMOL/L (ref 94–109)
CO2 SERPL-SCNC: 27 MMOL/L (ref 20–32)
CREAT SERPL-MCNC: 0.84 MG/DL (ref 0.66–1.25)
GFR SERPL CREATININE-BSD FRML MDRD: >90 ML/MIN/{1.73_M2}
GLUCOSE SERPL-MCNC: 129 MG/DL (ref 70–99)
POTASSIUM SERPL-SCNC: 3.4 MMOL/L (ref 3.4–5.3)
PROT SERPL-MCNC: 7.2 G/DL (ref 6.8–8.8)
SODIUM SERPL-SCNC: 139 MMOL/L (ref 133–144)

## 2018-12-28 LAB
HBV DNA SERPL NAA+PROBE-ACNC: ABNORMAL [IU]/ML
HBV DNA SERPL NAA+PROBE-LOG IU: 8 {LOG_IU}/ML

## 2019-01-02 ENCOUNTER — TELEPHONE (OUTPATIENT)
Dept: GASTROENTEROLOGY | Facility: CLINIC | Age: 64
End: 2019-01-02

## 2019-01-02 NOTE — TELEPHONE ENCOUNTER
Called patient to inform him of his elevated Hepatitis B viral load.  Patient stated that he was not able to take his medication for a few months due to insurance issues.  States he now has insurance and has been back on Hepatitis B medication for the past week.  Let patient know I will let him know when Dr. Potts wants him to repeat labs again.  No further questions or concerns.

## 2019-01-07 DIAGNOSIS — B18.1 CHRONIC VIRAL HEPATITIS B WITHOUT DELTA AGENT AND WITHOUT COMA (H): Primary | ICD-10-CM

## 2019-01-29 DIAGNOSIS — B18.1 CHRONIC VIRAL HEPATITIS B WITHOUT DELTA AGENT AND WITHOUT COMA (H): Primary | ICD-10-CM

## 2019-02-15 DIAGNOSIS — B18.1 CHRONIC VIRAL HEPATITIS B WITHOUT DELTA AGENT AND WITHOUT COMA (H): ICD-10-CM

## 2019-02-15 LAB
ERYTHROCYTE [DISTWIDTH] IN BLOOD BY AUTOMATED COUNT: 12.6 % (ref 10–15)
HCT VFR BLD AUTO: 37.8 % (ref 40–53)
HGB BLD-MCNC: 13.3 G/DL (ref 13.3–17.7)
MCH RBC QN AUTO: 32.6 PG (ref 26.5–33)
MCHC RBC AUTO-ENTMCNC: 35.2 G/DL (ref 31.5–36.5)
MCV RBC AUTO: 93 FL (ref 78–100)
PLATELET # BLD AUTO: 135 10E9/L (ref 150–450)
RBC # BLD AUTO: 4.08 10E12/L (ref 4.4–5.9)
WBC # BLD AUTO: 4.8 10E9/L (ref 4–11)

## 2019-02-15 PROCEDURE — 36415 COLL VENOUS BLD VENIPUNCTURE: CPT | Performed by: PHYSICIAN ASSISTANT

## 2019-02-15 PROCEDURE — 87517 HEPATITIS B DNA QUANT: CPT | Performed by: PHYSICIAN ASSISTANT

## 2019-02-15 PROCEDURE — 80048 BASIC METABOLIC PNL TOTAL CA: CPT | Performed by: PHYSICIAN ASSISTANT

## 2019-02-15 PROCEDURE — 80076 HEPATIC FUNCTION PANEL: CPT | Performed by: PHYSICIAN ASSISTANT

## 2019-02-15 PROCEDURE — 85027 COMPLETE CBC AUTOMATED: CPT | Performed by: PHYSICIAN ASSISTANT

## 2019-02-15 PROCEDURE — 85610 PROTHROMBIN TIME: CPT | Performed by: PHYSICIAN ASSISTANT

## 2019-02-16 LAB
ALBUMIN SERPL-MCNC: 4 G/DL (ref 3.4–5)
ALP SERPL-CCNC: 46 U/L (ref 40–150)
ALT SERPL W P-5'-P-CCNC: 52 U/L (ref 0–70)
ANION GAP SERPL CALCULATED.3IONS-SCNC: 2 MMOL/L (ref 3–14)
AST SERPL W P-5'-P-CCNC: 33 U/L (ref 0–45)
BILIRUB DIRECT SERPL-MCNC: 0.2 MG/DL (ref 0–0.2)
BILIRUB SERPL-MCNC: 0.6 MG/DL (ref 0.2–1.3)
BUN SERPL-MCNC: 12 MG/DL (ref 7–30)
CALCIUM SERPL-MCNC: 8.8 MG/DL (ref 8.5–10.1)
CHLORIDE SERPL-SCNC: 107 MMOL/L (ref 94–109)
CO2 SERPL-SCNC: 29 MMOL/L (ref 20–32)
CREAT SERPL-MCNC: 0.83 MG/DL (ref 0.66–1.25)
GFR SERPL CREATININE-BSD FRML MDRD: >90 ML/MIN/{1.73_M2}
GLUCOSE SERPL-MCNC: 84 MG/DL (ref 70–99)
INR PPP: 1.03 (ref 0.86–1.14)
POTASSIUM SERPL-SCNC: 3.7 MMOL/L (ref 3.4–5.3)
PROT SERPL-MCNC: 7.3 G/DL (ref 6.8–8.8)
SODIUM SERPL-SCNC: 138 MMOL/L (ref 133–144)

## 2019-02-19 LAB
HBV DNA SERPL NAA+PROBE-ACNC: ABNORMAL [IU]/ML
HBV DNA SERPL NAA+PROBE-LOG IU: 5.1 {LOG_IU}/ML

## 2019-02-25 ENCOUNTER — OFFICE VISIT (OUTPATIENT)
Dept: GASTROENTEROLOGY | Facility: CLINIC | Age: 64
End: 2019-02-25
Attending: PHYSICIAN ASSISTANT
Payer: COMMERCIAL

## 2019-02-25 VITALS
HEIGHT: 70 IN | DIASTOLIC BLOOD PRESSURE: 87 MMHG | TEMPERATURE: 98.1 F | OXYGEN SATURATION: 99 % | HEART RATE: 61 BPM | WEIGHT: 170.2 LBS | SYSTOLIC BLOOD PRESSURE: 133 MMHG | BODY MASS INDEX: 24.37 KG/M2

## 2019-02-25 DIAGNOSIS — B18.1 CHRONIC VIRAL HEPATITIS B WITHOUT DELTA AGENT AND WITHOUT COMA (H): Primary | ICD-10-CM

## 2019-02-25 PROCEDURE — G0463 HOSPITAL OUTPT CLINIC VISIT: HCPCS | Mod: ZF

## 2019-02-25 ASSESSMENT — PAIN SCALES - GENERAL: PAINLEVEL: NO PAIN (0)

## 2019-02-25 ASSESSMENT — MIFFLIN-ST. JEOR: SCORE: 1583.27

## 2019-02-25 NOTE — PROGRESS NOTES
Hepatology Clinic note  Sadie Olea   Date of Birth 1955  Date of Service 2/25/2019         Assessment/plan:   Sadie Olea is a 63 year old [place] male with history of chronic Hepatitis B, e antigen positive (8/22/2018) and e antibody negative (8/2018). Last MRE in 2011 showing normal fibrosis. His recent viral levels have been quite elevated. His transaminases have been high normal raising concern for active virus rather than immune tolerant. He was without Hep B antivirals for a few months and his HBV DNA was 89 million in December and have trended down to 129,000 one week ago. He states he has been compliant with his medications since October. He has history of low platelets and his liver function is otherwise normal. He is due for HCC screening.     - US elastography to evaluate baseline fibrosis and screen for HCC   - Continue 300 mg tenofovir DF daily  - Repeat HCC screening in 6 months with abdominal ultrasound   - Follow-up in clinic in 6 months or sooner as needed    Seth Nguyen PA-C   [facility] Hepatology clinic    -----------------------------------------------------       HPI:   Sadie Olea is a 63 year old male  presenting for the follow-up.    Hepatitis B  E antigen positive (8/22/2018)  E antibody negative 98/22/2018)   -Diagnosed: 2009  -History: ? Jaundice at age 42   -MRE 2011: normal fibrosis   -Prior treatments:   Started on tenofovir DF in 2015 due to 10 year HCC risk w/ Reach B     Patient was last seen by Dr. Potts on 8/22/2018. Per EMR, he has had difficulty affording the medication.    He did not take the medication for two months. He restarted the Viread again in October 2018. His HBV DNA was   89 million in December 2018 and had trended down to 129,014 one week ago.     He states his appetite is fair. His fatigue has improved since restarting Hep B medication again. His weight has been stable. He denies any new medications, ER visits or hospitalizations.  "    Patient denies jaundice, lower extremity edema, abdominal distension or confusion.   Patient also denies melena, hematochezia or hematemesis.Patient denies weight loss, fevers, sweats or chills.    He rarely drinks alcohol. He     Medical hx Surgical hx   Past Medical History:   Diagnosis Date     GERD (gastroesophageal reflux disease)      HBV (hepatitis B virus) infection      Hyperlipidemia     No past surgical history on file.              Medications:     Current Outpatient Medications   Medication     tenofovir (VIREAD) 300 MG tablet     No current facility-administered medications for this visit.             Allergies:   No Known Allergies         Review of Systems:   10 points ROS was obtained and highlighted in the HPI, otherwise negative.          Physical Exam:   VS:  /89   Pulse 61   Temp 98.1  F (36.7  C) (Oral)   Ht 1.778 m (5' 10\")   Wt 77.2 kg (170 lb 3.2 oz)   SpO2 99%   BMI 24.42 kg/m        Gen- well, NAD, A+Ox3, normal color  Lym- no palpable LAD  CVS- RRR  RS- CTA  Abd- soft, nontender. No organomegaly.   Extr- hands normal, no HECTOR  Skin- no rash or jaundice  Neuro- no asterixis  Psych- normal mood         Data:   Reviewed in person and significant for:    Lab Results   Component Value Date     02/15/2019      Lab Results   Component Value Date    POTASSIUM 3.7 02/15/2019     Lab Results   Component Value Date    CHLORIDE 107 02/15/2019     Lab Results   Component Value Date    CO2 29 02/15/2019     Lab Results   Component Value Date    BUN 12 02/15/2019     Lab Results   Component Value Date    CR 0.83 02/15/2019       Lab Results   Component Value Date    WBC 4.8 02/15/2019     Lab Results   Component Value Date    HGB 13.3 02/15/2019     Lab Results   Component Value Date    HCT 37.8 02/15/2019     Lab Results   Component Value Date    MCV 93 02/15/2019     Lab Results   Component Value Date     02/15/2019       Lab Results   Component Value Date    AST 33 " 02/15/2019     Lab Results   Component Value Date    ALT 52 02/15/2019     No results found for: BILICONJ   Lab Results   Component Value Date    BILITOTAL 0.6 02/15/2019       Lab Results   Component Value Date    ALBUMIN 4.0 02/15/2019     Lab Results   Component Value Date    PROTTOTAL 7.3 02/15/2019      Lab Results   Component Value Date    ALKPHOS 46 02/15/2019       Lab Results   Component Value Date    INR 1.03 02/15/2019         MRCP:   11/2/2016:   1.  Dependent gallstones without choledocholithiasis or inflammatory signs.   Mild extrahepatic fusiform ectasia of the common hepatic duct and the   common bile duct, tapering to normal caliber distally.    2.  A few cortical cysts in the kidneys including a mildly complex cyst   containing proteinaceous or hemorrhagic material in the dependent aspect on   the left in the midportion posteriorly. Stone material in the lower pole   of the left kidney was better defined on recent CT.    3.  Above study performed and interpreted in consultation with Dr. Darden.

## 2019-02-25 NOTE — LETTER
2/25/2019    RE: Sadie Olea  [address] [place] [address]     Hepatology Clinic note  Sadie Olea   Date of Birth 1955  Date of Service 2/25/2019         Assessment/plan:   Sadie Olea is a 63 year old [place] male with history of chronic Hepatitis B, e antigen positive (8/22/2018) and e antibody negative (8/2018). Last MRE in 2011 showing normal fibrosis. His recent viral levels have been quite elevated. His transaminases have been high normal raising concern for active virus rather than immune tolerant. He was without Hep B antivirals for a few months and his HBV DNA was 89 million in December and have trended down to 129,000 one week ago. He states he has been compliant with his medications since October. He has history of low platelets and his liver function is otherwise normal. He is due for HCC screening.     - US elastography to evaluate baseline fibrosis and screen for HCC   - Continue 300 mg tenofovir DF daily  - Repeat HCC screening in 6 months with abdominal ultrasound   - Follow-up in clinic in 6 months or sooner as needed    Seth Nguyen PA-C   [facility] Hepatology clinic    -----------------------------------------------------       HPI:   Sadie Olea is a 63 year old male  presenting for the follow-up.    Hepatitis B  E antigen positive (8/22/2018)  E antibody negative 98/22/2018)   -Diagnosed: 2009  -History: ? Jaundice at age 42   -MRE 2011: normal fibrosis   -Prior treatments:   Started on tenofovir DF in 2015 due to 10 year HCC risk w/ Reach B     Patient was last seen by Dr. Potts on 8/22/2018. Per EMR, he has had difficulty affording the medication.    He did not take the medication for two months. He restarted the Viread again in October 2018. His HBV DNA was   89 million in December 2018 and had trended down to 129,014 one week ago.     He states his appetite is fair. His fatigue has improved since restarting Hep B medication again. His weight has been  "stable. He denies any new medications, ER visits or hospitalizations.     Patient denies jaundice, lower extremity edema, abdominal distension or confusion.   Patient also denies melena, hematochezia or hematemesis.Patient denies weight loss, fevers, sweats or chills.    He rarely drinks alcohol. He     Medical hx Surgical hx   Past Medical History:   Diagnosis Date     GERD (gastroesophageal reflux disease)      HBV (hepatitis B virus) infection      Hyperlipidemia     No past surgical history on file.              Medications:     Current Outpatient Medications   Medication     tenofovir (VIREAD) 300 MG tablet     No current facility-administered medications for this visit.             Allergies:   No Known Allergies         Review of Systems:   10 points ROS was obtained and highlighted in the HPI, otherwise negative.          Physical Exam:   VS:  /89   Pulse 61   Temp 98.1  F (36.7  C) (Oral)   Ht 1.778 m (5' 10\")   Wt 77.2 kg (170 lb 3.2 oz)   SpO2 99%   BMI 24.42 kg/m         Gen- well, NAD, A+Ox3, normal color  Lym- no palpable LAD  CVS- RRR  RS- CTA  Abd- soft, nontender. No organomegaly.   Extr- hands normal, no HECTOR  Skin- no rash or jaundice  Neuro- no asterixis  Psych- normal mood         Data:   Reviewed in person and significant for:    Lab Results   Component Value Date     02/15/2019      Lab Results   Component Value Date    POTASSIUM 3.7 02/15/2019     Lab Results   Component Value Date    CHLORIDE 107 02/15/2019     Lab Results   Component Value Date    CO2 29 02/15/2019     Lab Results   Component Value Date    BUN 12 02/15/2019     Lab Results   Component Value Date    CR 0.83 02/15/2019       Lab Results   Component Value Date    WBC 4.8 02/15/2019     Lab Results   Component Value Date    HGB 13.3 02/15/2019     Lab Results   Component Value Date    HCT 37.8 02/15/2019     Lab Results   Component Value Date    MCV 93 02/15/2019     Lab Results   Component Value Date    PLT " 135 02/15/2019       Lab Results   Component Value Date    AST 33 02/15/2019     Lab Results   Component Value Date    ALT 52 02/15/2019     No results found for: BILICONJ   Lab Results   Component Value Date    BILITOTAL 0.6 02/15/2019       Lab Results   Component Value Date    ALBUMIN 4.0 02/15/2019     Lab Results   Component Value Date    PROTTOTAL 7.3 02/15/2019      Lab Results   Component Value Date    ALKPHOS 46 02/15/2019       Lab Results   Component Value Date    INR 1.03 02/15/2019         MRCP:   11/2/2016:   1.  Dependent gallstones without choledocholithiasis or inflammatory signs.   Mild extrahepatic fusiform ectasia of the common hepatic duct and the   common bile duct, tapering to normal caliber distally.    2.  A few cortical cysts in the kidneys including a mildly complex cyst   containing proteinaceous or hemorrhagic material in the dependent aspect on   the left in the midportion posteriorly. Stone material in the lower pole   of the left kidney was better defined on recent CT.    3.  Above study performed and interpreted in consultation with Dr. Darden.      Seth Nguyen PA-C

## 2019-05-29 DIAGNOSIS — B18.1 CHRONIC VIRAL HEPATITIS B WITHOUT DELTA AGENT AND WITHOUT COMA (H): Primary | ICD-10-CM

## 2019-05-29 NOTE — TELEPHONE ENCOUNTER
Pt requesting rx for Vemlidy 25mg, not on med list    Thank you!  Stefania Hodge [facility]

## 2019-05-31 NOTE — TELEPHONE ENCOUNTER
Health Call Center    Phone Message    May a detailed message be left on voicemail: yes    Reason for Call: Medication Refill Request    Has the patient contacted the pharmacy for the refill? Yes   Name of medication being requested: Vemildy (replacing Viread)  Provider who prescribed the medication: Dr. Potts  Pharmacy: [facility]  Date medication is needed: Week of 6/3/19    Patient stated he is receiving a lashon to help pay for the medication which is why he's requesting the change from Vired to Vemildy.       Action Taken: Message routed to:  [facility] ([facility] Hepatology

## 2019-06-03 NOTE — TELEPHONE ENCOUNTER
Vemlidy 25 mg daily by mouth, 30 day supply with 11 refills prescribed to [facility]. It was a pleasure to see you at your recent visit. Please le

## 2019-07-01 DIAGNOSIS — B18.1 CHRONIC VIRAL HEPATITIS B WITHOUT DELTA AGENT AND WITHOUT COMA (H): ICD-10-CM

## 2019-07-01 LAB
ALBUMIN SERPL-MCNC: 3.7 G/DL (ref 3.4–5)
ALP SERPL-CCNC: 51 U/L (ref 40–150)
ALT SERPL W P-5'-P-CCNC: 31 U/L (ref 0–70)
ANION GAP SERPL CALCULATED.3IONS-SCNC: 8 MMOL/L (ref 3–14)
AST SERPL W P-5'-P-CCNC: 23 U/L (ref 0–45)
BILIRUB DIRECT SERPL-MCNC: 0.2 MG/DL (ref 0–0.2)
BILIRUB SERPL-MCNC: 0.9 MG/DL (ref 0.2–1.3)
BUN SERPL-MCNC: 14 MG/DL (ref 7–30)
CALCIUM SERPL-MCNC: 8.2 MG/DL (ref 8.5–10.1)
CHLORIDE SERPL-SCNC: 110 MMOL/L (ref 94–109)
CO2 SERPL-SCNC: 25 MMOL/L (ref 20–32)
CREAT SERPL-MCNC: 0.86 MG/DL (ref 0.66–1.25)
ERYTHROCYTE [DISTWIDTH] IN BLOOD BY AUTOMATED COUNT: 12.6 % (ref 10–15)
GFR SERPL CREATININE-BSD FRML MDRD: >90 ML/MIN/{1.73_M2}
GLUCOSE SERPL-MCNC: 86 MG/DL (ref 70–99)
HCT VFR BLD AUTO: 36.5 % (ref 40–53)
HGB BLD-MCNC: 13.1 G/DL (ref 13.3–17.7)
INR PPP: 1.14 (ref 0.86–1.14)
MCH RBC QN AUTO: 32.3 PG (ref 26.5–33)
MCHC RBC AUTO-ENTMCNC: 35.9 G/DL (ref 31.5–36.5)
MCV RBC AUTO: 90 FL (ref 78–100)
PLATELET # BLD AUTO: 152 10E9/L (ref 150–450)
POTASSIUM SERPL-SCNC: 3.6 MMOL/L (ref 3.4–5.3)
PROT SERPL-MCNC: 7 G/DL (ref 6.8–8.8)
RBC # BLD AUTO: 4.06 10E12/L (ref 4.4–5.9)
SODIUM SERPL-SCNC: 143 MMOL/L (ref 133–144)
WBC # BLD AUTO: 4.4 10E9/L (ref 4–11)

## 2019-07-01 PROCEDURE — 36415 COLL VENOUS BLD VENIPUNCTURE: CPT | Performed by: PHYSICIAN ASSISTANT

## 2019-07-01 PROCEDURE — 85610 PROTHROMBIN TIME: CPT | Performed by: PHYSICIAN ASSISTANT

## 2019-07-01 PROCEDURE — 80076 HEPATIC FUNCTION PANEL: CPT | Performed by: PHYSICIAN ASSISTANT

## 2019-07-01 PROCEDURE — 87517 HEPATITIS B DNA QUANT: CPT | Performed by: PHYSICIAN ASSISTANT

## 2019-07-01 PROCEDURE — 80048 BASIC METABOLIC PNL TOTAL CA: CPT | Performed by: PHYSICIAN ASSISTANT

## 2019-07-01 PROCEDURE — 85027 COMPLETE CBC AUTOMATED: CPT | Performed by: PHYSICIAN ASSISTANT

## 2019-07-01 NOTE — LETTER
July 2, 2019       TO: Sadie Olea  [address] [facility] [address] [facility] [address]       Dear Mr. Olea,    We are writing to inform you of your test results.    Your Hep B virus levels are improving.     Continue taking the medication every day.     Please let me know if you have any questions or concerns.     Clinic Staff - [phone] option 3     Sincerely,     Seth Nguyen PA-C   [address] [place] [address], Mail [id number]  [place]  [address].

## 2019-07-02 LAB
HBV DNA SERPL NAA+PROBE-ACNC: 1735 [IU]/ML
HBV DNA SERPL NAA+PROBE-LOG IU: 3.2 {LOG_IU}/ML

## 2019-07-15 ENCOUNTER — HOSPITAL ENCOUNTER (OUTPATIENT)
Dept: ULTRASOUND IMAGING | Facility: CLINIC | Age: 64
Discharge: HOME | End: 2019-07-15
Attending: PHYSICIAN ASSISTANT | Admitting: PHYSICIAN ASSISTANT
Payer: COMMERCIAL

## 2019-07-15 DIAGNOSIS — B18.1 CHRONIC VIRAL HEPATITIS B WITHOUT DELTA AGENT AND WITHOUT COMA (H): ICD-10-CM

## 2019-07-15 PROCEDURE — 76700 US EXAM ABDOM COMPLETE: CPT

## 2019-07-15 NOTE — LETTER
July 22, 2019       TO: Sadie Olea  [address] [facility] [address]       Dear Mr. Olea,    We are writing to inform you of your test results.    Your ultrasound shows changes no worrisome liver masses.     We will plan to repeat the ultrasound in 6 months for ongoing liver cancer screening.     Please let me know if you have any questions or concerns.     Clinic Staff - [phone] option 3     Sincerely,     Seth Nguyen PA-C  [address] [place] [address], Mail Code [id number]  [place]  [address].

## 2019-08-21 ENCOUNTER — TRANSFERRED RECORDS (OUTPATIENT)
Dept: HEALTH INFORMATION MANAGEMENT | Facility: CLINIC | Age: 64
End: 2019-08-21

## 2019-08-22 ENCOUNTER — TELEPHONE (OUTPATIENT)
Dept: GASTROENTEROLOGY | Facility: CLINIC | Age: 64
End: 2019-08-22

## 2019-08-22 NOTE — TELEPHONE ENCOUNTER
Left message for pt reminding them of upcoming appointment.  Instructed pt to bring updated medications list.  Estelle Macedo on 8/22/2019 at 9:43 AM

## 2019-08-26 ENCOUNTER — OFFICE VISIT (OUTPATIENT)
Dept: GASTROENTEROLOGY | Facility: CLINIC | Age: 64
End: 2019-08-26
Attending: PHYSICIAN ASSISTANT
Payer: COMMERCIAL

## 2019-08-26 VITALS
HEART RATE: 56 BPM | TEMPERATURE: 97.8 F | BODY MASS INDEX: 24.24 KG/M2 | SYSTOLIC BLOOD PRESSURE: 136 MMHG | DIASTOLIC BLOOD PRESSURE: 83 MMHG | WEIGHT: 169.3 LBS | HEIGHT: 70 IN | RESPIRATION RATE: 18 BRPM | OXYGEN SATURATION: 99 %

## 2019-08-26 DIAGNOSIS — B18.1 CHRONIC VIRAL HEPATITIS B WITHOUT DELTA AGENT AND WITHOUT COMA (H): ICD-10-CM

## 2019-08-26 DIAGNOSIS — B18.1 CHRONIC VIRAL HEPATITIS B WITHOUT DELTA AGENT AND WITHOUT COMA (H): Primary | ICD-10-CM

## 2019-08-26 PROCEDURE — G0463 HOSPITAL OUTPT CLINIC VISIT: HCPCS | Mod: ZF

## 2019-08-26 PROCEDURE — 91200 LIVER ELASTOGRAPHY: CPT | Mod: ZF

## 2019-08-26 ASSESSMENT — PAIN SCALES - GENERAL: PAINLEVEL: NO PAIN (0)

## 2019-08-26 ASSESSMENT — MIFFLIN-ST. JEOR: SCORE: 1574.19

## 2019-08-26 NOTE — LETTER
8/26/2019       RE: Sadie Olea  [address]  [place] [address]     Dear Colleague,    Thank you for referring your patient, Sadie Olea, to the [facility] HEPATOLOGY at [facility]. Please see a copy of my visit note below.    Hepatology Clinic note  Sadie Olea   Date of Birth 1955  Date of Service 8/26/2019         Assessment/plan:   Sadie Olea is a 64 year old male with history of chronic hepatitis B, e antigen positive and e antibody negative (2018), maintained on Vemlidy for active disease. Last MR Elastography in 2011 was normal. He has low normal platelets and otherwise normal liver function. His last HBV DNA viremia was low and his transaminase were normal correlating with medication compliance. He understands the importance of medication compliance and knows to contact the clinic if he has any difficulties obtaining the medication. He is up to date with HCC screening.     - Fibrosis scan today to assess for disease progression  - Continue 25 mg tenofovir alafenomide (Vemlidy) daily  - HCC Screening with abdominal ultrasound in six months  - BMP, Hepatic panel, CBC, INR and HBV DNA   - Follow-up in clinic in one year or sooner as needed    Seth Nguyen PA-C   [facility] Hepatology clinic    -----------------------------------------------------       HPI:   Sadie Olea is a 64 year old male  presenting for the follow-up.    Hepatitis B  E antigen positive (8/22/2018)  E antibody negative 98/22/2018)   -Diagnosed: 2009  -History: ? Jaundice at age 42   -MRE 2011: normal fibrosis   -Prior treatments:   Started on tenofovir DF in 2015 due to 10 year HCC risk w/ Reach B   Changed to tenofovir AF in May 2019     Patient was last seen by me on 2/25/2019. He was switched to tenovofir alafenomide in May 2019 to insurance coverage and lashon assistance changes. He states he has been taking Vemlidy without difficulty and has had good compliance. His  "last HBV DNA level was 1735. His recent ALT was 31 and AST 23.     He denies any recent hospitalizations or ER visits. His appetite has been normal. His weight is stable. He has regular bowel movements.     Patient denies jaundice, lower extremity edema, abdominal distension or confusion.  Patient also denies melena, hematochezia or hematemesis. Patient denies weight loss, fevers, sweats or chills.    He does not drink alcohol. He works as a nursing assistant.     Medical hx Surgical hx   Past Medical History:   Diagnosis Date     GERD (gastroesophageal reflux disease)      HBV (hepatitis B virus) infection      Hyperlipidemia     No past surgical history on file.              Medications:     Current Outpatient Medications   Medication     tenofovir alafenamide fumarate (VEMLIDY) 25 MG tablet     No current facility-administered medications for this visit.             Allergies:   No Known Allergies         Review of Systems:   10 points ROS was obtained and highlighted in the HPI, otherwise negative.          Physical Exam:   VS:  /83 (BP Location: Right arm, Patient Position: Sitting, Cuff Size: Adult Regular)   Pulse 56   Temp 97.8  F (36.6  C) (Oral)   Resp 18   Ht 1.778 m (5' 10\")   Wt 76.8 kg (169 lb 4.8 oz)   SpO2 99%   BMI 24.29 kg/m         Gen- well, NAD, A+Ox3, normal color  Lym- no palpable LAD  CVS- RRR  RS- CTA  Abd- soft, nontender. No organomegaly.   Extr- hands normal, no HECTOR  Skin- no rash or jaundice  Neuro- no asterixis  Psych- normal mood         Data:   Reviewed in person and significant for:    Lab Results   Component Value Date     07/01/2019      Lab Results   Component Value Date    POTASSIUM 3.6 07/01/2019     Lab Results   Component Value Date    CHLORIDE 110 07/01/2019     Lab Results   Component Value Date    CO2 25 07/01/2019     Lab Results   Component Value Date    BUN 14 07/01/2019     Lab Results   Component Value Date    CR 0.86 07/01/2019       Lab Results "   Component Value Date    WBC 4.4 07/01/2019     Lab Results   Component Value Date    HGB 13.1 07/01/2019     Lab Results   Component Value Date    HCT 36.5 07/01/2019     Lab Results   Component Value Date    MCV 90 07/01/2019     Lab Results   Component Value Date     07/01/2019       Lab Results   Component Value Date    AST 23 07/01/2019     Lab Results   Component Value Date    ALT 31 07/01/2019     No results found for: BILICONJ   Lab Results   Component Value Date    BILITOTAL 0.9 07/01/2019       Lab Results   Component Value Date    ALBUMIN 3.7 07/01/2019     Lab Results   Component Value Date    PROTTOTAL 7.0 07/01/2019      Lab Results   Component Value Date    ALKPHOS 51 07/01/2019       Lab Results   Component Value Date    INR 1.14 07/01/2019         7/15/2019:   US ABDOMEN COMPLETE:   1. No focal hepatic lesion is identified.  2. Cholelithiasis.  3. No significant change of common duct mild dilatation measuring 0.8  cm.  4. Bilateral renal cysts.  5. Nonobstructing stones suggested at the lower left kidney    Again, thank you for allowing me to participate in the care of your patient.      Sincerely,    Seth Nguyen PA-C

## 2019-08-26 NOTE — LETTER
"    August 27, 2019       TO: Sadie Olea  [address] [place] [address]       Dear Mr. Olea,    We are writing to inform you of your test results.    The scan to assess scar tissue (fibrosis) showed that you have minimal to no scarring. We give the liver fibrosis a score from 0 to 4. 0 means no scar tissue. 1 means a little bit (mild). 2 is some (moderate). 3 is called \"bridging fibrosis\" and represents significant scarring (severe). 4 indicates cirrhosis.     Your score was 0-1.     Your scarring has improved with treatment! This is great.     Continue taking your Vemlidy every day.     It was a pleasure to see you at your recent visit. Please let me know if you have any questions or concerns.     Clinic Staff - [phone] option 3     Sincerely,     Seth Nguyen PA-C   [address] [facility], Mail [id number]  [place]  [address].    "

## 2019-08-26 NOTE — LETTER
8/26/2019      RE: Sadie Olea  [address] [address]       Hepatology Clinic note  Sadie Olea   Date of Birth 1955  Date of Service 8/26/2019         Assessment/plan:   Sadie Olea is a 64 year old male with history of chronic hepatitis B, e antigen positive and e antibody negative (2018), maintained on Vemlidy for active disease. Last MR Elastography in 2011 was normal. He has low normal platelets and otherwise normal liver function. His last HBV DNA viremia was low and his transaminase were normal correlating with medication compliance. He understands the importance of medication compliance and knows to contact the clinic if he has any difficulties obtaining the medication. He is up to date with HCC screening.     - Fibrosis scan today to assess for disease progression  - Continue 25 mg tenofovir alafenomide (Vemlidy) daily  - HCC Screening with abdominal ultrasound in six months  - BMP, Hepatic panel, CBC, INR and HBV DNA   - Follow-up in clinic in one year or sooner as needed    Seth Nguyen PA-C   [facility] Hepatology clinic    -----------------------------------------------------       HPI:   Sadie Olea is a 64 year old male  presenting for the follow-up.    Hepatitis B  E antigen positive (8/22/2018)  E antibody negative 98/22/2018)   -Diagnosed: 2009  -History: ? Jaundice at age 42   -MRE 2011: normal fibrosis   -Prior treatments:   Started on tenofovir DF in 2015 due to 10 year HCC risk w/ Reach B   Changed to tenofovir AF in May 2019     Patient was last seen by me on 2/25/2019. He was switched to tenovofir alafenomide in May 2019 to insurance coverage and lashon assistance changes. He states he has been taking Vemlidy without difficulty and has had good compliance. His last HBV DNA level was 1735. His recent ALT was 31 and AST 23.     He denies any recent hospitalizations or ER visits. His appetite has been normal. His weight is stable. He has regular bowel  "movements.     Patient denies jaundice, lower extremity edema, abdominal distension or confusion.  Patient also denies melena, hematochezia or hematemesis. Patient denies weight loss, fevers, sweats or chills.    He does not drink alcohol. He works as a nursing assistant.     Medical hx Surgical hx   Past Medical History:   Diagnosis Date     GERD (gastroesophageal reflux disease)      HBV (hepatitis B virus) infection      Hyperlipidemia     No past surgical history on file.              Medications:     Current Outpatient Medications   Medication     tenofovir alafenamide fumarate (VEMLIDY) 25 MG tablet     No current facility-administered medications for this visit.             Allergies:   No Known Allergies         Review of Systems:   10 points ROS was obtained and highlighted in the HPI, otherwise negative.          Physical Exam:   VS:  /83 (BP Location: Right arm, Patient Position: Sitting, Cuff Size: Adult Regular)   Pulse 56   Temp 97.8  F (36.6  C) (Oral)   Resp 18   Ht 1.778 m (5' 10\")   Wt 76.8 kg (169 lb 4.8 oz)   SpO2 99%   BMI 24.29 kg/m         Gen- well, NAD, A+Ox3, normal color  Lym- no palpable LAD  CVS- RRR  RS- CTA  Abd- soft, nontender. No organomegaly.   Extr- hands normal, no HECTOR  Skin- no rash or jaundice  Neuro- no asterixis  Psych- normal mood         Data:   Reviewed in person and significant for:    Lab Results   Component Value Date     07/01/2019      Lab Results   Component Value Date    POTASSIUM 3.6 07/01/2019     Lab Results   Component Value Date    CHLORIDE 110 07/01/2019     Lab Results   Component Value Date    CO2 25 07/01/2019     Lab Results   Component Value Date    BUN 14 07/01/2019     Lab Results   Component Value Date    CR 0.86 07/01/2019       Lab Results   Component Value Date    WBC 4.4 07/01/2019     Lab Results   Component Value Date    HGB 13.1 07/01/2019     Lab Results   Component Value Date    HCT 36.5 07/01/2019     Lab Results "   Component Value Date    MCV 90 07/01/2019     Lab Results   Component Value Date     07/01/2019       Lab Results   Component Value Date    AST 23 07/01/2019     Lab Results   Component Value Date    ALT 31 07/01/2019     No results found for: BILICONJ   Lab Results   Component Value Date    BILITOTAL 0.9 07/01/2019       Lab Results   Component Value Date    ALBUMIN 3.7 07/01/2019     Lab Results   Component Value Date    PROTTOTAL 7.0 07/01/2019      Lab Results   Component Value Date    ALKPHOS 51 07/01/2019       Lab Results   Component Value Date    INR 1.14 07/01/2019         7/15/2019:   US ABDOMEN COMPLETE:   1. No focal hepatic lesion is identified.  2. Cholelithiasis.  3. No significant change of common duct mild dilatation measuring 0.8  cm.  4. Bilateral renal cysts.  5. Nonobstructing stones suggested at the lower left kidney    Seth Nguyen PA-C

## 2019-08-26 NOTE — PROGRESS NOTES
Hepatology Clinic note  Sadie Olea   Date of Birth 1955  Date of Service 8/26/2019         Assessment/plan:   Sadie Olea is a 64 year old male with history of chronic hepatitis B, e antigen positive and e antibody negative (2018), maintained on Vemlidy for active disease. Last MR Elastography in 2011 was normal. He has low normal platelets and otherwise normal liver function. His last HBV DNA viremia was low and his transaminase were normal correlating with medication compliance. He understands the importance of medication compliance and knows to contact the clinic if he has any difficulties obtaining the medication. He is up to date with HCC screening.     - Fibrosis scan today to assess for disease progression  - Continue 25 mg tenofovir alafenomide (Vemlidy) daily  - HCC Screening with abdominal ultrasound in six months  - BMP, Hepatic panel, CBC, INR and HBV DNA   - Follow-up in clinic in one year or sooner as needed    Seth Nguyen PA-C   [facility] Hepatology clinic    -----------------------------------------------------       HPI:   Sadie Olea is a 64 year old male  presenting for the follow-up.    Hepatitis B  E antigen positive (8/22/2018)  E antibody negative 98/22/2018)   -Diagnosed: 2009  -History: ? Jaundice at age 42   -MRE 2011: normal fibrosis   -Prior treatments:   Started on tenofovir DF in 2015 due to 10 year HCC risk w/ Reach B   Changed to tenofovir AF in May 2019     Patient was last seen by me on 2/25/2019. He was switched to tenovofir alafenomide in May 2019 to insurance coverage and lashon assistance changes. He states he has been taking Vemlidy without difficulty and has had good compliance. His last HBV DNA level was 1735. His recent ALT was 31 and AST 23.     He denies any recent hospitalizations or ER visits. His appetite has been normal. His weight is stable. He has regular bowel movements.     Patient denies jaundice, lower extremity edema, abdominal  "distension or confusion.  Patient also denies melena, hematochezia or hematemesis. Patient denies weight loss, fevers, sweats or chills.    He does not drink alcohol. He works as a nursing assistant.     Medical hx Surgical hx   Past Medical History:   Diagnosis Date     GERD (gastroesophageal reflux disease)      HBV (hepatitis B virus) infection      Hyperlipidemia     No past surgical history on file.              Medications:     Current Outpatient Medications   Medication     tenofovir alafenamide fumarate (VEMLIDY) 25 MG tablet     No current facility-administered medications for this visit.             Allergies:   No Known Allergies         Review of Systems:   10 points ROS was obtained and highlighted in the HPI, otherwise negative.          Physical Exam:   VS:  /83 (BP Location: Right arm, Patient Position: Sitting, Cuff Size: Adult Regular)   Pulse 56   Temp 97.8  F (36.6  C) (Oral)   Resp 18   Ht 1.778 m (5' 10\")   Wt 76.8 kg (169 lb 4.8 oz)   SpO2 99%   BMI 24.29 kg/m        Gen- well, NAD, A+Ox3, normal color  Lym- no palpable LAD  CVS- RRR  RS- CTA  Abd- soft, nontender. No organomegaly.   Extr- hands normal, no HECTOR  Skin- no rash or jaundice  Neuro- no asterixis  Psych- normal mood         Data:   Reviewed in person and significant for:    Lab Results   Component Value Date     07/01/2019      Lab Results   Component Value Date    POTASSIUM 3.6 07/01/2019     Lab Results   Component Value Date    CHLORIDE 110 07/01/2019     Lab Results   Component Value Date    CO2 25 07/01/2019     Lab Results   Component Value Date    BUN 14 07/01/2019     Lab Results   Component Value Date    CR 0.86 07/01/2019       Lab Results   Component Value Date    WBC 4.4 07/01/2019     Lab Results   Component Value Date    HGB 13.1 07/01/2019     Lab Results   Component Value Date    HCT 36.5 07/01/2019     Lab Results   Component Value Date    MCV 90 07/01/2019     Lab Results   Component Value Date "     07/01/2019       Lab Results   Component Value Date    AST 23 07/01/2019     Lab Results   Component Value Date    ALT 31 07/01/2019     No results found for: BILICONJ   Lab Results   Component Value Date    BILITOTAL 0.9 07/01/2019       Lab Results   Component Value Date    ALBUMIN 3.7 07/01/2019     Lab Results   Component Value Date    PROTTOTAL 7.0 07/01/2019      Lab Results   Component Value Date    ALKPHOS 51 07/01/2019       Lab Results   Component Value Date    INR 1.14 07/01/2019         7/15/2019:   US ABDOMEN COMPLETE:   1. No focal hepatic lesion is identified.  2. Cholelithiasis.  3. No significant change of common duct mild dilatation measuring 0.8  cm.  4. Bilateral renal cysts.  5. Nonobstructing stones suggested at the lower left kidney

## 2019-08-26 NOTE — NURSING NOTE
"Chief Complaint   Patient presents with     RECHECK     Hep B       Vital signs:  Temp: 97.8  F (36.6  C) Temp src: Oral BP: 136/83 Pulse: 56   Resp: 18 SpO2: 99 %     Height: 177.8 cm (5' 10\") Weight: 76.8 kg (169 lb 4.8 oz)  Estimated body mass index is 24.29 kg/m  as calculated from the following:    Height as of this encounter: 1.778 m (5' 10\").    Weight as of this encounter: 76.8 kg (169 lb 4.8 oz).          Ana Johnson, LTAC, located within [facility]  8/26/2019 9:47 AM      "

## 2019-09-13 ENCOUNTER — OFFICE VISIT (OUTPATIENT)
Dept: OPTOMETRY | Facility: CLINIC | Age: 64
End: 2019-09-13
Payer: COMMERCIAL

## 2019-09-13 DIAGNOSIS — H52.4 PRESBYOPIA: ICD-10-CM

## 2019-09-13 DIAGNOSIS — H52.03 HYPERMETROPIA OF BOTH EYES: Primary | ICD-10-CM

## 2019-09-13 DIAGNOSIS — H26.9 BILATERAL INCIPIENT CATARACTS: ICD-10-CM

## 2019-09-13 PROCEDURE — 92004 COMPRE OPH EXAM NEW PT 1/>: CPT | Performed by: OPTOMETRIST

## 2019-09-13 PROCEDURE — 92015 DETERMINE REFRACTIVE STATE: CPT | Performed by: OPTOMETRIST

## 2019-09-13 ASSESSMENT — TONOMETRY
OD_IOP_MMHG: 16
IOP_METHOD: APPLANATION
OS_IOP_MMHG: 16

## 2019-09-13 ASSESSMENT — VISUAL ACUITY
METHOD: SNELLEN - LINEAR
OD_SC: 20/60
OS_SC: 20/80-1
OD_SC: 20/20
OD_SC+: -3
OS_SC: 20/30
OS_SC+: -2

## 2019-09-13 ASSESSMENT — REFRACTION_MANIFEST
OD_ADD: +2.75
OD_SPHERE: +0.50
OD_CYLINDER: +1.00
OS_CYLINDER: +1.00
OS_ADD: +2.75
OS_AXIS: 087
METHOD_AUTOREFRACTION: 1
OS_SPHERE: +0.25
OD_SPHERE: +0.75
OD_AXIS: 079
OD_CYLINDER: SPHERE
OS_SPHERE: +0.25
OS_AXIS: 065
OS_CYLINDER: +0.25

## 2019-09-13 ASSESSMENT — CONF VISUAL FIELD
OD_NORMAL: 1
OS_NORMAL: 1

## 2019-09-13 ASSESSMENT — CUP TO DISC RATIO
OS_RATIO: 0.5
OD_RATIO: 0.55

## 2019-09-13 ASSESSMENT — EXTERNAL EXAM - LEFT EYE: OS_EXAM: NORMAL

## 2019-09-13 ASSESSMENT — EXTERNAL EXAM - RIGHT EYE: OD_EXAM: NORMAL

## 2019-09-13 ASSESSMENT — REFRACTION_WEARINGRX: SPECS_TYPE: RX READERS DIDNT BRING

## 2019-09-13 NOTE — LETTER
9/13/2019         RE: Sadie Olea  [address]  [place] [address]        Dear Colleague,    Thank you for referring your patient, Sadie Olea, to the [facility]. Please see a copy of my visit note below.    Chief Complaint   Patient presents with     Annual Eye Exam         Last Eye Exam: 10-9-2017  Dilated Previously: Yes    What are you currently using to see?  rx readers       Distance Vision Acuity: Noticed gradual change in both eyes    Near Vision Acuity: Not satisfied if reads too long     Eye Comfort: good  Do you use eye drops? : No  Occupation or Hobbies: computer    Estela Tarango Optometric Assistant, A.B.O.C.          Medical, surgical and family histories reviewed and updated 9/13/2019.     Father had glaucoma   OBJECTIVE: See Ophthalmology exam    ASSESSMENT:    ICD-10-CM    1. 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 Presbyopia H52.4    3. Bilateral incipient cataracts H26.9       PLAN:   Continue with readers   Mildred Wadsworth OD     Again, thank you for allowing me to participate in the care of your patient.        Sincerely,        Mildred Wadsworth, OD

## 2019-09-13 NOTE — PROGRESS NOTES
Chief Complaint   Patient presents with     Annual Eye Exam         Last Eye Exam: 10-9-2017  Dilated Previously: Yes    What are you currently using to see?  rx readers       Distance Vision Acuity: Noticed gradual change in both eyes    Near Vision Acuity: Not satisfied if reads too long     Eye Comfort: good  Do you use eye drops? : No  Occupation or Hobbies: computer    Estela Tarango Optometric Assistant, A.B.O.C.          Medical, surgical and family histories reviewed and updated 9/13/2019.     Father had glaucoma   OBJECTIVE: See Ophthalmology exam    ASSESSMENT:    ICD-10-CM    1. 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 Presbyopia H52.4    3. Bilateral incipient cataracts H26.9       PLAN:   Continue with readers   Mildred Wadsworth OD

## 2019-09-13 NOTE — PATIENT INSTRUCTIONS
Very little change, distance is actually less prescription in glasses  Optional distance , need prescription for reading   Pressure is still good/ same, follow up in one year  Early cataracts, vision is still very good

## 2019-09-20 ENCOUNTER — THERAPY VISIT (OUTPATIENT)
Dept: CHIROPRACTIC MEDICINE | Facility: CLINIC | Age: 64
End: 2019-09-20
Payer: COMMERCIAL

## 2019-09-20 DIAGNOSIS — M54.50 LUMBAGO: ICD-10-CM

## 2019-09-20 DIAGNOSIS — M25.512 BILATERAL SHOULDER PAIN: ICD-10-CM

## 2019-09-20 DIAGNOSIS — M99.03 SEGMENTAL DYSFUNCTION OF LUMBAR REGION: ICD-10-CM

## 2019-09-20 DIAGNOSIS — M62.838 SPASM OF MUSCLE: ICD-10-CM

## 2019-09-20 DIAGNOSIS — M25.511 BILATERAL SHOULDER PAIN: ICD-10-CM

## 2019-09-20 DIAGNOSIS — M99.05 SEGMENTAL DYSFUNCTION OF PELVIC REGION: Primary | ICD-10-CM

## 2019-09-20 DIAGNOSIS — M99.02 THORACIC SEGMENT DYSFUNCTION: ICD-10-CM

## 2019-09-20 DIAGNOSIS — M99.07 SOMATIC DYSFUNCTION OF UPPER EXTREMITIES: ICD-10-CM

## 2019-09-20 PROCEDURE — 99203 OFFICE O/P NEW LOW 30 MIN: CPT | Mod: 25 | Performed by: CHIROPRACTOR

## 2019-09-20 PROCEDURE — 98941 CHIROPRACT MANJ 3-4 REGIONS: CPT | Mod: AT | Performed by: CHIROPRACTOR

## 2019-09-20 PROCEDURE — 98943 CHIROPRACT MANJ XTRSPINL 1/>: CPT | Performed by: CHIROPRACTOR

## 2019-09-20 PROCEDURE — 97110 THERAPEUTIC EXERCISES: CPT | Performed by: CHIROPRACTOR

## 2019-09-20 NOTE — PROGRESS NOTES
Initial Chiropractic Clinic Visit    PCP: Jane Mcbride    Sadie Olea is a 64 year old male who is seen  as a self referral presenting with low back and bilateral shoulder pain . Patient reports that the onset on his low back pain was 3 days ago after reaching for something while seated.  The pain is located in his lower right lumbar region.  He rates the pain at a 5 out of 10 and is described as a dull achy pain with periods of sharpness depending on movement and position.  There are no radiating symptoms.  His shoulder pain started about 6 months ago, insidiously.   When asked, patient denies:, falling, slipping, bending and reaching or sleeping awkwardly. The pain is located bilaterally in the anterior deltoid region of his shoulders.      Injury:     Location of Pain: bilateral shoulder and lower lumbar at the following level(s) L4 , L5  and Extra-spinal:  Duration of Pain: 6 month(s) for shoulders and 3 days for low back  Rating of Pain at worst: 10/10  Rating of Pain Currently: 5/10  Symptoms are better with: Rest  Symptoms are worse with: reaching for things  Additional Features:      Health History  as reported by the patient:    How does the patient rate their own health:   Good    Current or past medical history:   No red flags identified    Medical allergies  None    Past Traumas/Surgeries  None    Family History  The family history includes Diabetes Type 2  in his mother; Glaucoma in his father; Hepatitis in his sister; Hypertension in his brother.    Medications:  None    Occupation:  Nursing assistant    Primary job tasks:   Computer work    Barriers as home/work:   none    Additional health Issues:                 Sadie was asked to complete the Oswestry Low Back Disability Index and Estefani Start Back screening tool, today in the office.  Disability score: 16%. Keel Start Total Score:3 Sub Score: 1     Review of Systems  Musculoskeletal: as above  Remainder of review of systems is negative  "including constitutional, CV, pulmonary, GI, Skin and Neurologic except as noted in HPI or medical history.    Past Medical History:   Diagnosis Date     GERD (gastroesophageal reflux disease)      HBV (hepatitis B virus) infection      Hyperlipidemia      No past surgical history on file.  Objective  There were no vitals taken for this visit.      GENERAL APPEARANCE: healthy, alert and no distress   GAIT: NORMAL  SKIN: no suspicious lesions or rashes  NEURO: Normal strength and tone, mentation intact and speech normal  PSYCH:  mentation appears normal and affect normal/bright    Low back exam:    Inspection:  \"     no visible deformity in the low back       normal skin\",    ROM:       full flexion       full extension    Tender:       paraspinal muscles    Non Tender:       remainder of lumbar spine    Strength:       hip flexion 5/5 Normal       knee extension 5/5 Normal       ankle dorsiflexion 5/5 Normal       ankle plantarflexion 5/5 Normal       dorsiflexion of the great toe 5/5 Normal    Reflexes:       patellar (L3, L4) 2 bilaterally    Sensation:      grossly intact throughout lower extremities    Special tests:  SLR - Right negative and Left negative, Fabere - Right positive, Yeoman's - Right negative and Left negative, Wendy - Right negative and Left negative and Ely's - Right negative and Left negative    Shoulder Exam  appearance abnormal both shoulder are rotated anterior forward shoulder position  range of motion: flexion full, extension full, abduction full, adduction full, internal rotation full, external rotation full, (bilaterally  tenderness upon palpation anterior deltoid      Segmental spinal dysfunction/restrictions found at:   T5, T6, T7, L4 , L5 , PSIS Right  and Extra-spinal:  Scapula    The following soft tissue hypotonicities were observed:Piriformis: right, referred pain: no    Trigger points were found in:Piriformis and Traps    Muscle spasm found in:Piriformis and " Traps      Radiology:      Assessment:    1. Segmental dysfunction of pelvic region    2. Lumbago    3. Segmental dysfunction of lumbar region    4. Spasm of muscle    5. Thoracic segment dysfunction    6. Somatic dysfunction of upper extremities    7. Bilateral shoulder pain        RX ordered/plan of care  Anticipated outcomes  Possible risks and side effects    After discussing the risk and benefits of care, patient consented to treatment    Prognosis: Good      Patient's condition:  Patient had restrictions pre-manipulation    Treatment effectiveness:  Post manipulation there is better intersegmental movement and Patient claims to feel looser post manipulation      Plan:    Procedures:  Evaluation and Management:  92820 Moderate level exam 30 min    CMT:  05132 Chiropractic manipulative treatment 3-4 regions performed   42617 Chiropractic manipulative treatment extraspinal dysfunction/restriction  Thoracic: Diversified, T5, T6 T7, prone  Lumbar: Diversified and Activator, L4, L5, Prone, Side posture  Pelvis: Drop Table, PSIS Right , Prone  Extra-spinal: Mobilization, scapula, Prone    Modalities:  15232: MSTM:  To Piriformis  for 5 min    Therapeutic procedures:  46939: Therapeutic Exercises  Direct one-on-one treatment to develop flexibilty, range of motion, strength and endurance.   The following were demonstrated and practiced:   Stretches - Reviewed stretches today.  Crossed leg piriformis stretch seated  Opposite knee to opposite shoulder  Crossed leg piriformis stretch supine  Total time: 12    Response to Treatment  Reduction in symptoms as reported by patient      Treatment plan and goals:  Goals:  USING A COMPUTER: the patient specific goals is to attain his pre-injury status of 8 hours    Frequency of care  Duration of care is estimated to be 6 weeks, from the initial treatment.  It is estimated that the patient will need a total of 6 visits to resolve this episode.  For the initial therapeutic trial of  care, the frequency is recommended at 1 X week, once daily.  A reevaluation would be clinically appropriate in 6 visits, to determine progress and further course of care.    In-Office Treatment  Evaluation  Spinal Chiropractic Manipulative Therapy:    Extra-Spinal Chiropractic Manipulative Therapy:    Modalities: MSTM  Therapeutic exercises:  Stretches        Recommendations:    Instructions:  ice 20 minutes every other hour as needed and stretch as instructed at visit    Follow-up:    Return to care in one week.       Discussed the assessment with the patient.      Disclaimer: This note consists of symbols derived from keyboarding, dictation and/or voice recognition software. As a result, there may be errors in the script that have gone undetected. Please consider this when interpreting information found in this chart.

## 2019-09-24 ENCOUNTER — ANCILLARY PROCEDURE (OUTPATIENT)
Dept: GENERAL RADIOLOGY | Facility: CLINIC | Age: 64
End: 2019-09-24
Attending: FAMILY MEDICINE
Payer: COMMERCIAL

## 2019-09-24 ENCOUNTER — OFFICE VISIT (OUTPATIENT)
Dept: PEDIATRICS | Facility: CLINIC | Age: 64
End: 2019-09-24
Payer: COMMERCIAL

## 2019-09-24 VITALS
DIASTOLIC BLOOD PRESSURE: 76 MMHG | WEIGHT: 167.7 LBS | HEIGHT: 70 IN | HEART RATE: 52 BPM | OXYGEN SATURATION: 100 % | SYSTOLIC BLOOD PRESSURE: 136 MMHG | TEMPERATURE: 97.6 F | BODY MASS INDEX: 24.01 KG/M2

## 2019-09-24 DIAGNOSIS — M25.511 BILATERAL SHOULDER PAIN, UNSPECIFIED CHRONICITY: ICD-10-CM

## 2019-09-24 DIAGNOSIS — M25.512 BILATERAL SHOULDER PAIN, UNSPECIFIED CHRONICITY: Primary | ICD-10-CM

## 2019-09-24 DIAGNOSIS — B18.1 CHRONIC VIRAL HEPATITIS B WITHOUT DELTA AGENT AND WITHOUT COMA (H): ICD-10-CM

## 2019-09-24 DIAGNOSIS — M25.512 BILATERAL SHOULDER PAIN, UNSPECIFIED CHRONICITY: ICD-10-CM

## 2019-09-24 DIAGNOSIS — M25.511 BILATERAL SHOULDER PAIN, UNSPECIFIED CHRONICITY: Primary | ICD-10-CM

## 2019-09-24 PROBLEM — J18.9 PNEUMONIA: Status: RESOLVED | Noted: 2017-10-09 | Resolved: 2019-09-24

## 2019-09-24 PROCEDURE — 73030 X-RAY EXAM OF SHOULDER: CPT | Mod: LT

## 2019-09-24 PROCEDURE — 73030 X-RAY EXAM OF SHOULDER: CPT | Mod: RT

## 2019-09-24 PROCEDURE — 99214 OFFICE O/P EST MOD 30 MIN: CPT | Performed by: FAMILY MEDICINE

## 2019-09-24 ASSESSMENT — MIFFLIN-ST. JEOR: SCORE: 1566.93

## 2019-09-24 NOTE — PROGRESS NOTES
"CHIEF COMPLAINT    Shoulder pain.      HISTORY    This patient has had bilateral shoulder pain for about 6 months.  It seems to be worse when he abducts and externally rotates his shoulders.  It is worse when he lies down on his shoulder.  Left side is a bit worse than right.  He is not complaining of neck pain.    He has tried a little bit of naproxen.  He went on YouTube and reviewed a number of shoulder exercises and has been doing these which I think is helped his range of motion.    Patient also has some low back pain but that is more recent, more like in the last week.  It is not radiating to his legs.    Patient is on antiviral medication for chronic hepatitis B.  He is followed by the liver clinic [facility] at the [facility].  Joint pain does not seem to be a side effect of his med.      Patient Active Problem List   Diagnosis     Benign prostatic hyperplasia     Bilateral renal cysts     Dermatochalasis of eyelids of both eyes     Esophageal reflux     Essential hypertension     Urgency of urination     Inflammatory liver disease     Hepatitis B virus infection     Hyperlipidemia     Nonallopathic lesion of lumbar region     Osteoarthrosis     Pneumonia     Spasm of muscle       Current Outpatient Medications   Medication Sig Dispense Refill     tenofovir alafenamide fumarate (VEMLIDY) 25 MG tablet Take 1 tablet (25 mg) by mouth daily with food (dispense only in the original container). 30 tablet 11       REVIEW OF SYSTEMS    No fever  No breathing problems.  No CP  No abd pain      Past Medical History:   Diagnosis Date     GERD (gastroesophageal reflux disease)      HBV (hepatitis B virus) infection      Hyperlipidemia        EXAM  /76 (BP Location: Right arm, Patient Position: Sitting, Cuff Size: Adult Regular)   Pulse 52   Temp 97.6  F (36.4  C) (Oral)   Ht 1.778 m (5' 10\")   Wt 76.1 kg (167 lb 11.2 oz)   SpO2 100%   BMI 24.06 kg/m      Thin, well appearing man.  No scleral " icterus  Neck non tender  Resp non labored  Abd non distended  R and L shoulder: abduct to about 80 degrees, then tender and some limitation of external rotation, no crepitus      X ray:  R and L shoulder appear WNL.      (M25.511,  M25.512) Bilateral shoulder pain, unspecified chronicity  (primary encounter diagnosis)  Comment:   Inflammation bilaterally - like a capsulitis.  I have suggested he see Ortho also.    Plan: XR Shoulder Right G/E 3 Views, XR Shoulder Left        G/E 3 Views, ORTHOPEDICS ADULT REFERRAL        For now continue Naproxen, stretching.      (B18.1) Chronic viral hepatitis B without delta agent and without coma (H)  Comment:   ? If related to the arthralgias.    Plan:   As above.

## 2019-09-27 ENCOUNTER — THERAPY VISIT (OUTPATIENT)
Dept: CHIROPRACTIC MEDICINE | Facility: CLINIC | Age: 64
End: 2019-09-27
Payer: COMMERCIAL

## 2019-09-27 DIAGNOSIS — M62.838 SPASM OF MUSCLE: ICD-10-CM

## 2019-09-27 DIAGNOSIS — M54.2 CERVICALGIA: ICD-10-CM

## 2019-09-27 DIAGNOSIS — M99.05 SEGMENTAL DYSFUNCTION OF PELVIC REGION: Primary | ICD-10-CM

## 2019-09-27 DIAGNOSIS — M99.02 THORACIC SEGMENT DYSFUNCTION: ICD-10-CM

## 2019-09-27 DIAGNOSIS — M99.01 CERVICAL SEGMENT DYSFUNCTION: ICD-10-CM

## 2019-09-27 DIAGNOSIS — M99.03 SEGMENTAL DYSFUNCTION OF LUMBAR REGION: ICD-10-CM

## 2019-09-27 DIAGNOSIS — M54.50 LUMBAGO: ICD-10-CM

## 2019-09-27 PROCEDURE — 98941 CHIROPRACT MANJ 3-4 REGIONS: CPT | Mod: AT | Performed by: CHIROPRACTOR

## 2019-09-27 NOTE — PROGRESS NOTES
Visit #:  2    Subjective:  Sadie Olea is a 64 year old male who is seen in f/u up for:        Segmental dysfunction of pelvic region  Lumbago  Segmental dysfunction of lumbar region  Spasm of muscle  Thoracic segment dysfunction  Cervicalgia  Cervical segment dysfunction.     Since last visit on 9/20/2019,  Sadie Olea reports:    Area of chief complaint:  Cervical and Lumbar :  Symptoms are graded at 7/10. The quality is described as stiff, achey.  Motion has improved slightly. Patient feels that they feeling a little improved in their low back and his shoulder are feeling the same.  His low back is feeling looser and he is still having some pain in his shoulders when reaching for something above his head or behind him.      Objective:  The following was observed:    P: palpatory tenderness Piriformis and Traps   A: static palpation demonstrates intersegmental asymmetry , cervical, thoracic, lumbar, pelvis  R: motion palpation notes restricted motion, C6 , C7 , T1 , T2 , L4 , L5  and PSIS Right   T: hypertonicity at: Lumbar erector spine, Piriformis and Traps     Segmental spinal dysfunction/restrictions found at:  C6 , C7 , T1 , T2 , L4 , L5  and PSIS Right       Assessment:    Diagnoses:      1. Segmental dysfunction of pelvic region    2. Lumbago    3. Segmental dysfunction of lumbar region    4. Spasm of muscle    5. Thoracic segment dysfunction    6. Cervicalgia    7. Cervical segment dysfunction        Patient's condition:  Patient had restrictions pre-manipulation    Treatment effectiveness:  Post manipulation there is better intersegmental movement and Patient claims to feel looser post manipulation      Procedures:  CMT:  76177 Chiropractic manipulative treatment 3-4 regions performed   Cervical: Activator, C6, C7 , Prone  Thoracic: Activator, T1, T2, Prone  Lumbar: Activator, L4, L5, Prone  Pelvis: Drop Table, PSIS Right , Prone    Modalities:  MSTM to pirifomris lumbar erector and upper traps for 5  min    Therapeutic procedures:  None    Response to Treatment  Reduction in symptoms as reported by patient    Prognosis: Good    Progress towards Goals: Patient is making progress towards the goal.     Recommendations:    Instructions:  ice 20 minutes every other hour as needed    Follow-up:    Return to care in one week.

## 2019-10-29 ENCOUNTER — TELEPHONE (OUTPATIENT)
Dept: GASTROENTEROLOGY | Facility: CLINIC | Age: 64
End: 2019-10-29

## 2019-10-29 DIAGNOSIS — B18.1 CHRONIC VIRAL HEPATITIS B WITHOUT DELTA AGENT AND WITHOUT COMA (H): Primary | ICD-10-CM

## 2019-10-29 NOTE — TELEPHONE ENCOUNTER
Good afternoon,    Karma's insurance has a high copay for Vemlidy.  He has been using the co-pay card to offset this cost, however, the card is now maxed out for the year leaving him with a copay of almost $1000.00.  Patient is wondering if he could transition to generic Viread in lieu of Vemlidy as there are no other funding options available at this time such as grants.      I ran a testclaim for the generic Viread and the insurance would charge $6.37 for a 30-day supply which is much better.    Please advise,    Thank you!

## 2019-10-31 RX ORDER — TENOFOVIR DISOPROXIL FUMARATE 300 MG/1
300 TABLET, FILM COATED ORAL DAILY
Qty: 30 TABLET | Refills: 11 | Status: SHIPPED | OUTPATIENT
Start: 2019-10-31 | End: 2020-08-24

## 2019-10-31 NOTE — TELEPHONE ENCOUNTER
Ordered Viread 300mg daily per ok from Seth.     RE: switch to Viread?   Received: Yesterday   Message Contents   Seth Nguyen PA-C Guidarelli, Jacob RN   Caller: Unspecified (2 days ago,  2:40 PM)             Absolutely    Previous Messages      ----- Message -----   From: Noel Moseley RN   Sent: 10/30/2019  10:55 AM CDT   To: Seth Nguyen PA-C, *   Subject: switch to Viread?                                 Ok to switch to Viread (cost is $6 vs $1000 for Vemlidy).     Creatinine in normal/therapeutic range.     Thanks,   Colby

## 2019-11-05 ENCOUNTER — HEALTH MAINTENANCE LETTER (OUTPATIENT)
Age: 64
End: 2019-11-05

## 2020-01-28 DIAGNOSIS — B18.1 CHRONIC VIRAL HEPATITIS B WITHOUT DELTA AGENT AND WITHOUT COMA (H): ICD-10-CM

## 2020-01-28 LAB
ERYTHROCYTE [DISTWIDTH] IN BLOOD BY AUTOMATED COUNT: 12.5 % (ref 10–15)
HCT VFR BLD AUTO: 36.1 % (ref 40–53)
HGB BLD-MCNC: 12.7 G/DL (ref 13.3–17.7)
INR PPP: 1.08 (ref 0.86–1.14)
MCH RBC QN AUTO: 31.7 PG (ref 26.5–33)
MCHC RBC AUTO-ENTMCNC: 35.2 G/DL (ref 31.5–36.5)
MCV RBC AUTO: 90 FL (ref 78–100)
PLATELET # BLD AUTO: 156 10E9/L (ref 150–450)
RBC # BLD AUTO: 4.01 10E12/L (ref 4.4–5.9)
WBC # BLD AUTO: 4.9 10E9/L (ref 4–11)

## 2020-01-28 PROCEDURE — 85610 PROTHROMBIN TIME: CPT | Performed by: PHYSICIAN ASSISTANT

## 2020-01-28 PROCEDURE — 80076 HEPATIC FUNCTION PANEL: CPT | Performed by: PHYSICIAN ASSISTANT

## 2020-01-28 PROCEDURE — 36415 COLL VENOUS BLD VENIPUNCTURE: CPT | Performed by: PHYSICIAN ASSISTANT

## 2020-01-28 PROCEDURE — 87517 HEPATITIS B DNA QUANT: CPT | Performed by: PHYSICIAN ASSISTANT

## 2020-01-28 PROCEDURE — 85027 COMPLETE CBC AUTOMATED: CPT | Performed by: PHYSICIAN ASSISTANT

## 2020-01-28 PROCEDURE — 80048 BASIC METABOLIC PNL TOTAL CA: CPT | Performed by: PHYSICIAN ASSISTANT

## 2020-01-28 NOTE — LETTER
January 30, 2020       TO: Sadie Olea  [address]  [facility] [address]       Dear Mr. Olea,    We are writing to inform you of your test results.    Your Hepatitis B levels remain low and the rest of your liver tests are normal.     Continue taking Vemlidy 25 mg daily. Please contact the clinic if you have any trouble getting refills of your medication.     It was a pleasure to see you at your recent visit. Please let me know if you have any questions or concerns.     Clinic Staff - [phone] option 3     Sincerely,     CHRISTIE Reyes  [address] [place] [address], Mail [id number]  [place]  [address].

## 2020-01-29 LAB
ALBUMIN SERPL-MCNC: 3.8 G/DL (ref 3.4–5)
ALP SERPL-CCNC: 52 U/L (ref 40–150)
ALT SERPL W P-5'-P-CCNC: 25 U/L (ref 0–70)
ANION GAP SERPL CALCULATED.3IONS-SCNC: 4 MMOL/L (ref 3–14)
AST SERPL W P-5'-P-CCNC: 19 U/L (ref 0–45)
BILIRUB DIRECT SERPL-MCNC: 0.2 MG/DL (ref 0–0.2)
BILIRUB SERPL-MCNC: 0.8 MG/DL (ref 0.2–1.3)
BUN SERPL-MCNC: 18 MG/DL (ref 7–30)
CALCIUM SERPL-MCNC: 8.6 MG/DL (ref 8.5–10.1)
CHLORIDE SERPL-SCNC: 108 MMOL/L (ref 94–109)
CO2 SERPL-SCNC: 28 MMOL/L (ref 20–32)
CREAT SERPL-MCNC: 0.86 MG/DL (ref 0.66–1.25)
GFR SERPL CREATININE-BSD FRML MDRD: >90 ML/MIN/{1.73_M2}
GLUCOSE SERPL-MCNC: 106 MG/DL (ref 70–99)
POTASSIUM SERPL-SCNC: 3.7 MMOL/L (ref 3.4–5.3)
PROT SERPL-MCNC: 7.4 G/DL (ref 6.8–8.8)
SODIUM SERPL-SCNC: 140 MMOL/L (ref 133–144)

## 2020-01-30 LAB
HBV DNA SERPL NAA+PROBE-ACNC: 313 [IU]/ML
HBV DNA SERPL NAA+PROBE-LOG IU: 2.5 {LOG_IU}/ML

## 2020-02-05 ENCOUNTER — OFFICE VISIT (OUTPATIENT)
Dept: PEDIATRICS | Facility: CLINIC | Age: 65
End: 2020-02-05
Payer: COMMERCIAL

## 2020-02-05 VITALS
OXYGEN SATURATION: 98 % | HEIGHT: 70 IN | BODY MASS INDEX: 24.34 KG/M2 | DIASTOLIC BLOOD PRESSURE: 78 MMHG | HEART RATE: 59 BPM | TEMPERATURE: 97.7 F | RESPIRATION RATE: 14 BRPM | WEIGHT: 170 LBS | SYSTOLIC BLOOD PRESSURE: 126 MMHG

## 2020-02-05 DIAGNOSIS — M54.50 ACUTE BILATERAL LOW BACK PAIN WITHOUT SCIATICA: Primary | ICD-10-CM

## 2020-02-05 PROCEDURE — 99213 OFFICE O/P EST LOW 20 MIN: CPT | Performed by: NURSE PRACTITIONER

## 2020-02-05 ASSESSMENT — MIFFLIN-ST. JEOR: SCORE: 1577.36

## 2020-02-05 NOTE — LETTER
February 5, 2020      RE: Sadie Olea  [address]  [place] [address]       To whom it may concern:    Sadie Olea was seen in our clinic today. He should be excused from work on 2/5 and 2/6 due to injury.       Sincerely,      Mildred Smith CNP

## 2020-02-05 NOTE — PROGRESS NOTES
"Subjective     Sadie Olea is a 64 year old male who presents to clinic today for the following health issues:    HPI   Back Pain       Duration: 1 day        Specific cause: none    Description:   Location of pain: low back right  Character of pain: sharp  Pain radiation: left side   New numbness or weakness in legs, not attributed to pain:  no     Intensity: Currently 7/10    History:   Pain interferes with job: YES  History of back problems: no prior back problems  Any previous MRI or X-rays: None  Sees a specialist for back pain:  No  Therapies tried without relief: band for back, aleve    Alleviating factors:   Improved by: sitting      Precipitating factors:  Worsened by: Lifting, Standing and Walking      Accompanying Signs & Symptoms:  Risk of Fracture:  None  Risk of Cauda Equina:  None  Risk of Infection:  None  Risk of Cancer:  None  Risk of Ankylosing Spondylitis:  Onset at age <35, male, AND morning back stiffness. no     Patient works in hospital setting, does a lot of heavy lifting with patients. Has had some intermittent back and b/l shoulder pain in the past but nothing this severe. Notes yesterday he was sitting on the toilet and stood up and noticed sharp pain to the right side of his back. He took aleve and this resolved some and he went to work as usual for an 8 hour shift. That evening after work he noticed increased back pain, and since then the pain has traveled to left side of back. Pain is exacerbated by movement and also interrupted his sleep last night. He did not fall. Denies any numbness or tingling of legs.        Reviewed and updated as needed this visit by Provider  Meds  Problems         Review of Systems   Otherwise ROS is negative except as stated above.    .    Objective    /78 (BP Location: Right arm, Patient Position: Chair, Cuff Size: Adult Regular)   Pulse 59   Temp 97.7  F (36.5  C) (Tympanic)   Resp 14   Ht 1.778 m (5' 10\")   Wt 77.1 kg (170 lb)   SpO2 98%  "  BMI 24.39 kg/m    Body mass index is 24.39 kg/m .  Physical Exam   GENERAL: healthy, alert and no distress  MSK: No obvious deformity of spine. No bruising, redness, or masses. Spine, SI joint, and paraspinal muscles non-tender to palpation but locates pain across lumbar spine and paraspinal muscles.  LE 5/5 strength but movement very slow and deliberate due to pain. Very limited ROM of spine.  NEURO: +2 DTRs of lower extremities. POSITIVE SLR b/l.      Diagnostic Test Results:  none         Assessment & Plan     1. Acute bilateral low back pain without sciatica  Due to severity of pain and physical job to see ortho for further eval. For now, aleve seems to be helping. Add in icing.  - Orthopedic & Spine  Referral; Future       Patient Instructions   You can keep taking the Aleve for now.  Ice to your back, can keep doing heat.  Schedule with the orthopedic clinic.      No follow-ups on file.    Mildred Smith NP  [facility] [place]

## 2020-02-06 ENCOUNTER — TELEPHONE (OUTPATIENT)
Dept: PEDIATRICS | Facility: CLINIC | Age: 65
End: 2020-02-06

## 2020-02-06 DIAGNOSIS — M54.50 ACUTE BILATERAL LOW BACK PAIN WITHOUT SCIATICA: Primary | ICD-10-CM

## 2020-02-06 NOTE — PATIENT INSTRUCTIONS
You can keep taking the Aleve for now.  Ice to your back, can keep doing heat.  Schedule with the orthopedic clinic.

## 2020-02-06 NOTE — TELEPHONE ENCOUNTER
Reason for call:  Other   Patient called regarding (reason for call): prescription  Additional comments: Pt had appt with Luis on 2/5 for back pain, states OTC pain meds are not helping, and is requesting a muscle relaxer and a pain med. Please contact pt to discuss and advise.    Phone number to reach patient:  Home number on file [phone] (home)    Best Time:  Any    Can we leave a detailed message on this number?  YES

## 2020-02-06 NOTE — TELEPHONE ENCOUNTER
Mildred-  He saw you yesterday.     Acute bilateral low back pain without sciatica  Due to severity of pain and physical job to see ortho for further eval. For now, aleve seems to be helping. Add in icing.  - Orthopedic & Spine  Referral; Future     Patient Instructions   You can keep taking the Aleve for now.  Ice to your back, can keep doing heat.  Schedule with the orthopedic clinic.     He says he is still not able to tolerate the pain. He says Aleve is not helping. He would like something stronger for the pain. Wonders if a muscle relaxer will help. He has ortho appt next week.     Kanwal Mckee, RN on 2/6/2020 at 5:12 PM     [phone]

## 2020-02-07 ENCOUNTER — HOSPITAL ENCOUNTER (OUTPATIENT)
Dept: ULTRASOUND IMAGING | Facility: CLINIC | Age: 65
Discharge: HOME | End: 2020-02-07
Attending: PHYSICIAN ASSISTANT | Admitting: PHYSICIAN ASSISTANT
Payer: COMMERCIAL

## 2020-02-07 DIAGNOSIS — B18.1 CHRONIC VIRAL HEPATITIS B WITHOUT DELTA AGENT AND WITHOUT COMA (H): ICD-10-CM

## 2020-02-07 PROCEDURE — 76700 US EXAM ABDOM COMPLETE: CPT

## 2020-02-07 RX ORDER — CYCLOBENZAPRINE HCL 10 MG
10 TABLET ORAL 3 TIMES DAILY PRN
Qty: 15 TABLET | Refills: 0 | Status: SHIPPED | OUTPATIENT
Start: 2020-02-07 | End: 2020-08-24

## 2020-02-07 NOTE — TELEPHONE ENCOUNTER
Left message for pt with info to start the muscle relaxant. He should call back if he has questions or needs anything else. Sent to his pharmacy per Mildred Smith NP. Kanwal Mckee RN on 2/7/2020 at 8:07 AM

## 2020-02-07 NOTE — LETTER
February 12, 2020       TO: Sadie Olea  [address]  [place] [address]       Dear Mr. Olea,    We are writing to inform you of your test results.    Your ultrasound shows no worrisome liver masses. We will plan to repeat the ultrasound in 6 months for ongoing liver cancer screening.     Please let me know if you have any questions or concerns.     Clinic Staff - [phone] option 3     Sincerely,     Seth Nguyen PA-C  [address] [place] [address], Mail [id number]  [place]  [address].

## 2020-02-07 NOTE — TELEPHONE ENCOUNTER
Yes he can try muscle relaxant. It may make him a little drowsy so first try taking at night to see how it makes him feel and wouldn't take before driving or work. Can also continue with the naproxen. Keep ortho appointment.  Rx pending, can you verify his pharmacy?  Thanks,  Mildred Smith, APRN, CNP

## 2020-02-10 ENCOUNTER — OFFICE VISIT (OUTPATIENT)
Dept: ORTHOPEDICS | Facility: CLINIC | Age: 65
End: 2020-02-10
Payer: COMMERCIAL

## 2020-02-10 ENCOUNTER — ANCILLARY PROCEDURE (OUTPATIENT)
Dept: GENERAL RADIOLOGY | Facility: CLINIC | Age: 65
End: 2020-02-10
Attending: FAMILY MEDICINE
Payer: COMMERCIAL

## 2020-02-10 VITALS
WEIGHT: 170 LBS | DIASTOLIC BLOOD PRESSURE: 72 MMHG | SYSTOLIC BLOOD PRESSURE: 120 MMHG | BODY MASS INDEX: 24.34 KG/M2 | HEIGHT: 70 IN

## 2020-02-10 DIAGNOSIS — M54.50 ACUTE BILATERAL LOW BACK PAIN WITHOUT SCIATICA: ICD-10-CM

## 2020-02-10 DIAGNOSIS — S39.012A STRAIN OF LUMBAR REGION, INITIAL ENCOUNTER: Primary | ICD-10-CM

## 2020-02-10 PROCEDURE — 99204 OFFICE O/P NEW MOD 45 MIN: CPT | Performed by: FAMILY MEDICINE

## 2020-02-10 PROCEDURE — 72100 X-RAY EXAM L-S SPINE 2/3 VWS: CPT

## 2020-02-10 RX ORDER — DICLOFENAC SODIUM 75 MG/1
75 TABLET, DELAYED RELEASE ORAL 2 TIMES DAILY PRN
Qty: 60 TABLET | Refills: 1 | Status: SHIPPED | OUTPATIENT
Start: 2020-02-10 | End: 2020-08-24

## 2020-02-10 ASSESSMENT — MIFFLIN-ST. JEOR: SCORE: 1577.36

## 2020-02-10 NOTE — PROGRESS NOTES
ASSESSMENT & PLAN  Patient Instructions     1. Strain of lumbar region, initial encounter    2. Acute bilateral low back pain without sciatica      -Patient has low back pain due to muscle strain  -Patient will start formal physical therapy and home exercise program.  -Patient will start Diclofenac 75 mg twice a day as needed  -Patient will be held out of work for the next 2 weeks and then on light duty  -We will follow-up in 3 weeks for reevaluation and progression of activity  -Call direct clinic number [[phone]] at any time with questions or concerns.    Albert Yeo MD [facility]  [facility] [place]          -----    SUBJECTIVE  Sadie Olea is a/an 64 year old male who is seen in consultation at the request of  Mildred Smith N.P. for evaluation of low back pain. The patient is seen by themselves.    Onset: 6 day(s) ago. Reports insidious onset without acute precipitating event.  Location of Pain: lower back. Constant pain.   Rating of Pain at worst: 9/10  Rating of Pain Currently: 6/10  Worsened by: bending down, sleeping and then getting up.   Better with: movement, heat, ice,   Treatments tried: ice, heat and Aleve, flexeril, doesn't know if helping or not. Cupping 3 visits.   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     Past Medical History:   Diagnosis Date     GERD (gastroesophageal reflux disease)      HBV (hepatitis B virus) infection      Hyperlipidemia      Social History     Socioeconomic History     Marital status:      Spouse name: Not on file     Number of children: Not on file     Years of education: Not on file     Highest education level: Not on file   Occupational  "History     Not on file   Social Needs     Financial resource strain: Not on file     Food insecurity:     Worry: Not on file     Inability: Not on file     Transportation needs:     Medical: Not on file     Non-medical: Not on file   Tobacco Use     Smoking status: Never Smoker     Smokeless tobacco: Never Used   Substance and Sexual Activity     Alcohol use: Not Currently     Drug use: Never     Sexual activity: Not on file   Lifestyle     Physical activity:     Days per week: Not on file     Minutes per session: Not on file     Stress: Not on file   Relationships     Social connections:     Talks on phone: Not on file     Gets together: Not on file     Attends Islam service: Not on file     Active member of club or organization: Not on file     Attends meetings of clubs or organizations: Not on file     Relationship status: Not on file     Intimate partner violence:     Fear of current or ex partner: Not on file     Emotionally abused: Not on file     Physically abused: Not on file     Forced sexual activity: Not on file   Other Topics Concern     Not on file   Social History Narrative     Not on file         Patient's past medical, surgical, social, and family histories were reviewed today and no changes are noted.    REVIEW OF SYSTEMS:  10 point ROS is negative other than symptoms noted above in HPI, Past Medical History or as stated below  Constitutional: NEGATIVE for fever, chills, change in weight  Skin: NEGATIVE for worrisome rashes, moles or lesions  GI/: NEGATIVE for bowel or bladder changes  Neuro: NEGATIVE for weakness, dizziness or paresthesias    OBJECTIVE:  /72   Ht 1.778 m (5' 10\")   Wt 77.1 kg (170 lb)   BMI 24.39 kg/m     General: healthy, alert and in no distress  HEENT: no scleral icterus or conjunctival erythema  Skin: no suspicious lesions or rash. No jaundice.  CV: no pedal edema  Resp: normal respiratory effort without conversational dyspnea   Psych: normal mood and " affect  Gait: normal steady gait with appropriate coordination and balance  Neuro: normal light touch sensory exam of the bilateral lower extremities.  DTR's 2+ patella and achilles bilaterally.  MSK:  THORACIC/LUMBAR SPINE  Inspection:    No gross deformity/asymmetry  Palpation:    Tender about the lumbar spinous processes and right para lumbar muscles. Otherwise remainder of landmarks are nontender.  Range of Motion:     Lumbar flexion limited substantially by pain limited by tightness    Lumbar extension limited substantially by pain limited by tightness  Strength:    Full strength throughout hips/quads/hamstrings  Special Tests:    Positive: none    Negative: straight leg raise (bilateral), slump test (bilateral), femoral stretch test (bilateral)      Independent visualization of the below image:  Recent Results (from the past 24 hour(s))   XR Lumbar Spine 2-3 Views*    Narrative    LUMBAR SPINE TWO TO THREE VIEWS  2/10/2020 11:10 AM     HISTORY: Acute bilateral low back pain without sciatica.    COMPARISON: None.       Impression    IMPRESSION:   1. Alignment of the lumbar spine is within normal limits. No loss of  vertebral body height. No significant loss of intervertebral disc  space. Mild degenerative anterior spurring at L1-L2, L2-L3, and L3-L4.    2. Calcification over the left mid abdomen could represent a stone  within the left renal collecting system.    RYAN C CUSIC, MD Albert Yeo MD [facility] and Orthopedic Care

## 2020-02-10 NOTE — LETTER
2/10/2020         RE: Sadie Olea  [address]  [place] [address]        Dear Colleague,    Thank you for referring your patient, Sadie Olea, to the [facility]. Please see a copy of my visit note below.    ASSESSMENT & PLAN  Patient Instructions     1. Strain of lumbar region, initial encounter    2. Acute bilateral low back pain without sciatica      -Patient has low back pain due to muscle strain  -Patient will start formal physical therapy and home exercise program.  -Patient will start Diclofenac 75 mg twice a day as needed  -Patient will be held out of work for the next 2 weeks and then on light duty  -We will follow-up in 3 weeks for reevaluation and progression of activity  -Call direct clinic number [[phone]] at any time with questions or concerns.    Albert Yeo MD [facility] Orthopedics and Sports Medicine  [facility]          -----    SUBJECTIVE  Sadie Olea is a/an 64 year old male who is seen in consultation at the request of  Mildred Smith N.P. for evaluation of low back pain. The patient is seen by themselves.    Onset: 6 day(s) ago. Reports insidious onset without acute precipitating event.  Location of Pain: lower back. Constant pain.   Rating of Pain at worst: 9/10  Rating of Pain Currently: 6/10  Worsened by: bending down, sleeping and then getting up.   Better with: movement, heat, ice,   Treatments tried: ice, heat and Aleve, flexeril, doesn't know if helping or not. Cupping 3 visits.   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     Past Medical History:   Diagnosis Date     GERD (gastroesophageal reflux disease)      HBV (hepatitis B virus) infection       "Hyperlipidemia      Social History     Socioeconomic History     Marital status:      Spouse name: Not on file     Number of children: Not on file     Years of education: Not on file     Highest education level: Not on file   Occupational History     Not on file   Social Needs     Financial resource strain: Not on file     Food insecurity:     Worry: Not on file     Inability: Not on file     Transportation needs:     Medical: Not on file     Non-medical: Not on file   Tobacco Use     Smoking status: Never Smoker     Smokeless tobacco: Never Used   Substance and Sexual Activity     Alcohol use: Not Currently     Drug use: Never     Sexual activity: Not on file   Lifestyle     Physical activity:     Days per week: Not on file     Minutes per session: Not on file     Stress: Not on file   Relationships     Social connections:     Talks on phone: Not on file     Gets together: Not on file     Attends [employer] service: Not on file     Active member of club or organization: Not on file     Attends meetings of clubs or organizations: Not on file     Relationship status: Not on file     Intimate partner violence:     Fear of current or ex partner: Not on file     Emotionally abused: Not on file     Physically abused: Not on file     Forced sexual activity: Not on file   Other Topics Concern     Not on file   Social History Narrative     Not on file         Patient's past medical, surgical, social, and family histories were reviewed today and no changes are noted.    REVIEW OF SYSTEMS:  10 point ROS is negative other than symptoms noted above in HPI, Past Medical History or as stated below  Constitutional: NEGATIVE for fever, chills, change in weight  Skin: NEGATIVE for worrisome rashes, moles or lesions  GI/: NEGATIVE for bowel or bladder changes  Neuro: NEGATIVE for weakness, dizziness or paresthesias    OBJECTIVE:  /72   Ht 1.778 m (5' 10\")   Wt 77.1 kg (170 lb)   BMI 24.39 kg/m      General: healthy, " alert and in no distress  HEENT: no scleral icterus or conjunctival erythema  Skin: no suspicious lesions or rash. No jaundice.  CV: no pedal edema  Resp: normal respiratory effort without conversational dyspnea   Psych: normal mood and affect  Gait: normal steady gait with appropriate coordination and balance  Neuro: normal light touch sensory exam of the bilateral lower extremities.  DTR's 2+ patella and achilles bilaterally.  MSK:  THORACIC/LUMBAR SPINE  Inspection:    No gross deformity/asymmetry  Palpation:    Tender about the lumbar spinous processes and right para lumbar muscles. Otherwise remainder of landmarks are nontender.  Range of Motion:     Lumbar flexion limited substantially by pain limited by tightness    Lumbar extension limited substantially by pain limited by tightness  Strength:    Full strength throughout hips/quads/hamstrings  Special Tests:    Positive: none    Negative: straight leg raise (bilateral), slump test (bilateral), femoral stretch test (bilateral)      Independent visualization of the below image:  Recent Results (from the past 24 hour(s))   XR Lumbar Spine 2-3 Views*    Narrative    LUMBAR SPINE TWO TO THREE VIEWS  2/10/2020 11:10 AM     HISTORY: Acute bilateral low back pain without sciatica.    COMPARISON: None.       Impression    IMPRESSION:   1. Alignment of the lumbar spine is within normal limits. No loss of  vertebral body height. No significant loss of intervertebral disc  space. Mild degenerative anterior spurring at L1-L2, L2-L3, and L3-L4.    2. Calcification over the left mid abdomen could represent a stone  within the left renal collecting system.    RYAN C CUSIC, MD Albert Yeo MD [facility]      Again, thank you for allowing me to participate in the care of your patient.        Sincerely,        Albert Yeo, MD

## 2020-02-10 NOTE — PATIENT INSTRUCTIONS
1. Strain of lumbar region, initial encounter    2. Acute bilateral low back pain without sciatica      -Patient has low back pain due to muscle strain  -Patient will start formal physical therapy and home exercise program.  -Patient will start Diclofenac 75 mg twice a day as needed  -Patient will be held out of work for the next 2 weeks and then on light duty  -We will follow-up in 3 weeks for reevaluation and progression of activity  -Call direct clinic number [[phone]] at any time with questions or concerns.    Albert Yeo MD CAQSM  [place] [facility]  [facility] [place]

## 2020-02-10 NOTE — LETTER
February 10, 2020      Sadie Olea  [address]  [place] [address]        To Whom It May Concern:    Sadie Olea was seen in our clinic. Please excuse him for work until 2/25/2020.  He may return to work on  with the following: limited to light duty - lifting no greater than 15 pounds until 3/4/2020.      Sincerely,        Albert Yeo, MD

## 2020-02-11 ENCOUNTER — THERAPY VISIT (OUTPATIENT)
Dept: PHYSICAL THERAPY | Facility: CLINIC | Age: 65
End: 2020-02-11
Payer: COMMERCIAL

## 2020-02-11 DIAGNOSIS — M54.50 ACUTE LOW BACK PAIN: ICD-10-CM

## 2020-02-11 PROCEDURE — 97161 PT EVAL LOW COMPLEX 20 MIN: CPT | Mod: GP | Performed by: PHYSICAL THERAPIST

## 2020-02-11 PROCEDURE — 97110 THERAPEUTIC EXERCISES: CPT | Mod: GP | Performed by: PHYSICAL THERAPIST

## 2020-02-11 NOTE — PROGRESS NOTES
[facility] Initial Evaluation  Subjective:  The history is provided by the patient. No  was used.   Patient Entered Health History - See Therapist Evaluation below  Sadie Olea being seen for low back pain.     Problem began: 2/4/2020.   Problem occurred: unknown  and reported as 5/10 on pain scale.  General health as reported by patient is good.  Pertinent medical history includes: hepatitis and osteoarthritis.   Red flags:  None as reported by patient.  Medical allergies: none.   Surgeries include:  None.    Current medications:  Anti-inflammatory and muscle relaxants.    Current occupation is nursing assistant.                     Therapist Generated HPI Evaluation  Problem details: Reporting insidious onset R sided low back pain beginning after work on the evening/early morning of 2/4-2/5/2020. The pain radiates to the left low back but denies pain into the legs, numbness and tingling. Was prescribed a muscle relaxant and anti-inflammatory, unsure if these are helping. Pain increases with bending, lifting, sitting > 60 min, standing > 30 minutes. Currently off work for 2 weeks, as patient works as a nursing assistant. Has been performing prone on elbows as prescribed from previous PT POC, finds this beneficial. Also reports benefit from 4 treatments of cupping..         Type of problem:  Lumbar.    This is a new condition.  Condition occurred with:  Insidious onset.  Where condition occurred: for unknown reasons.  Patient reports pain:  Lower lumbar spine, lumbar spine right and lumbar spine left.  Pain is described as aching and shooting and is constant.  Pain radiates to:  No radiation. Pain is the same all the time.  Since onset symptoms are gradually improving.  Symptoms are exacerbated by bending, lifting, sitting and standing  and relieved by other (cupping, laying prone w/ ext).      Restrictions due to condition include:  Currently not working due to present  treatment.  Barriers include:  None as reported by patient.    Physical Exam                    Objective:  Standing Alignment:        Lumbar:  Sway back                           Lumbar/SI Evaluation  ROM:  Arom wnl lumbar: REIL: improves ROM.  AROM Lumbar:   Flexion:        Proximal thighs  Ext:                    25%   Side Bend:        Left:     Right:   Rotation:           Left:     Right:   Side Glide:        Left:     Right:         Strength: TrA Contraction: poor, Glute Max R: 3/5, L: -4/5  Lumbar Myotomes:    T12-L3 (Hip Flex):  Left: 4    Right: 4  L2-4 (Quads):  Left:  5    Right:  5  L4 (Ankle DF):  Left:  5    Right:  5  L5 (Great Toe Ext): Left: 5    Right: 5   S1 (Toe Raise):  Left: 5    Right: 5        Neural Tension/Mobility:      Left side:SLR w/DF  negative.   Right side:   SLR w/DF positive.  Lumbar Palpation:    Tenderness present at Left:    Quadratus Lumborum; Erector Spinae and PSIS  Tenderness present at Right: Quadratus Lumborum; Erector Spinae and PSIS      Spinal Segmental Conclusions:     Level: Hypo noted at L4 and L5                                                   General     ROS    Assessment/Plan:    Patient is a 64 year old male with lumbar complaints.    Patient has the following significant findings with corresponding treatment plan.                Diagnosis 1:  Low back pain  Pain -  hot/cold therapy, manual therapy, education, directional preference exercise and home program  Decreased ROM/flexibility - manual therapy and therapeutic exercise  Decreased joint mobility - manual therapy and therapeutic exercise  Decreased strength - therapeutic exercise and therapeutic activities  Impaired muscle performance - neuro re-education  Decreased function - therapeutic activities  Impaired posture - neuro re-education    Therapy Evaluation Codes:   1) History comprised of:   Personal factors that impact the plan of care:      Profession.    Comorbidity factors that impact the plan of  care are:      Osteoarthritis.     Medications impacting care: Anti-inflammatory and Muscle relaxant.  2) Examination of Body Systems comprised of:   Body structures and functions that impact the plan of care:      Lumbar spine.   Activity limitations that impact the plan of care are:      Bending, Lifting, Sitting, Standing and Working.  3) Clinical presentation characteristics are:   Stable/Uncomplicated.  4) Decision-Making    Low complexity using standardized patient assessment instrument and/or measureable assessment of functional outcome.  Cumulative Therapy Evaluation is: Low complexity.    Previous and current functional limitations:  (See Goal Flow Sheet for this information)    Short term and Long term goals: (See Goal Flow Sheet for this information)     Communication ability:  Patient appears to be able to clearly communicate and understand verbal and written communication and follow directions correctly.  Treatment Explanation - The following has been discussed with the patient:   RX ordered/plan of care  Anticipated outcomes  Possible risks and side effects  This patient would benefit from PT intervention to resume normal activities.   Rehab potential is good.    Frequency:  1 X week, once daily  Duration:  for 4 weeks  Discharge Plan:  Achieve all LTG.  Independent in home treatment program.  Reach maximal therapeutic benefit.    Please refer to the daily flowsheet for treatment today, total treatment time and time spent performing 1:1 timed codes.

## 2020-02-13 ENCOUNTER — TELEPHONE (OUTPATIENT)
Dept: ORTHOPEDICS | Facility: CLINIC | Age: 65
End: 2020-02-13

## 2020-02-13 NOTE — TELEPHONE ENCOUNTER
Received call from Surendra Bryant. He asks to confirm diagnosis and dates seen. Informed we do not have permission to speak to him. Asked that he fax written request and fax number provided.     Please watch for fax.     ELIAZAR Mccarthy RN

## 2020-02-17 NOTE — TELEPHONE ENCOUNTER
Reason for Call:  Form, our goal is to have forms completed with 7 days, however, some forms may require a visit or additional information.    Type of letter, form or note:  form    Who is the form from?: [employer]    Where did the form come from: form was faxed in    Phone number of person requesting form: [phone]  Can we leave a detailed message on this number:  Not Applicable    Desired completion date of form: 2/18/2020      How will form be returned?:  fax to [phone]    Has the patient signed a consent form for release of information (may be included with form)? Not Applicable    Additional comments:   Faxed on 2/18/2020. Printed and sent to scan.     Form was started and place in Provider Basket for provider review/ completion at [facility]    Elly Abreu MS, ATC  .

## 2020-02-19 ENCOUNTER — THERAPY VISIT (OUTPATIENT)
Dept: PHYSICAL THERAPY | Facility: CLINIC | Age: 65
End: 2020-02-19
Payer: COMMERCIAL

## 2020-02-19 DIAGNOSIS — M54.50 ACUTE LOW BACK PAIN: ICD-10-CM

## 2020-02-19 PROCEDURE — 97112 NEUROMUSCULAR REEDUCATION: CPT | Mod: GP | Performed by: PHYSICAL THERAPIST

## 2020-02-19 PROCEDURE — 97140 MANUAL THERAPY 1/> REGIONS: CPT | Mod: GP | Performed by: PHYSICAL THERAPIST

## 2020-02-19 PROCEDURE — 97110 THERAPEUTIC EXERCISES: CPT | Mod: GP | Performed by: PHYSICAL THERAPIST

## 2020-03-03 ENCOUNTER — OFFICE VISIT (OUTPATIENT)
Dept: ORTHOPEDICS | Facility: CLINIC | Age: 65
End: 2020-03-03
Payer: COMMERCIAL

## 2020-03-03 VITALS
WEIGHT: 170 LBS | BODY MASS INDEX: 24.34 KG/M2 | SYSTOLIC BLOOD PRESSURE: 126 MMHG | HEIGHT: 70 IN | DIASTOLIC BLOOD PRESSURE: 78 MMHG

## 2020-03-03 DIAGNOSIS — M54.50 ACUTE BILATERAL LOW BACK PAIN WITHOUT SCIATICA: Primary | ICD-10-CM

## 2020-03-03 PROCEDURE — 99213 OFFICE O/P EST LOW 20 MIN: CPT | Performed by: FAMILY MEDICINE

## 2020-03-03 ASSESSMENT — MIFFLIN-ST. JEOR: SCORE: 1577.36

## 2020-03-03 NOTE — PROGRESS NOTES
"ASSESSMENT & PLAN  Patient Instructions     1. Acute bilateral low back pain without sciatica      -Patient is here for follow-up of low back pain due to an acute muscle strain  -Patient has been improving with physical therapy and light duty at work  -Patient will continue physical therapy and home exercise program  -Patient will continue with anti-inflammatories and muscle relaxers as needed  -Patient will continue with light duty until 3/20/2020 where he can return full duty as tolerated  -Call direct clinic number [[phone]] at any time with questions or concerns.    Albert Yeo MD [facility]  [facility]          -----    SUBJECTIVE:  Sadie Olea is a 64 year old male who is seen in follow-up for low back pain due to muscle strainThey were last seen 2/10/2020.     Since their last visit reports 75% improvement. Still has pain on the right side. Still has pain with lifting and twisting.   They indicate that their current pain level is 3/10. They have tried rest/activity avoidance, other medications: Diclofenac and Flexeril, home exercises and physical therapy (2 visits).      The patient is seen by themselves.    Patient's past medical, surgical, social, and family histories were reviewed today and no changes are noted.    REVIEW OF SYSTEMS:  Constitutional: NEGATIVE for fever, chills, change in weight  Skin: NEGATIVE for worrisome rashes, moles or lesions  GI/: NEGATIVE for bowel or bladder changes  Neuro: NEGATIVE for weakness, dizziness or paresthesias    OBJECTIVE:  /78   Ht 1.778 m (5' 10\")   Wt 77.1 kg (170 lb)   BMI 24.39 kg/m     General: healthy, alert and in no distress  HEENT: no scleral icterus or conjunctival erythema  Skin: no suspicious lesions or rash. No jaundice.  CV: regular rhythm by palpation, no pedal edema  Resp: normal respiratory effort without conversational dyspnea   Psych: normal mood and affect  Gait: normal steady " gait with appropriate coordination and balance  Neuro: normal light touch sensory exam of the extremities.    MSK:  THORACIC/LUMBAR SPINE  Inspection:    No gross deformity/asymmetry  Palpation:    Tender about the lumbar spinous processes and right para lumbar muscles. Otherwise remainder of landmarks are nontender.  Range of Motion:     Lumbar flexion limited slightly by pain limited by tightness    Lumbar extension limited slightly by pain limited by tightness  Strength:    Full strength throughout hips/quads/hamstrings  Special Tests:    Positive: none    Negative: straight leg raise (bilateral), slump test (bilateral), femoral stretch test (bilateral)    Independent visualization of the below image:  Recent Results (from the past 24 hour(s))   XR Lumbar Spine 2-3 Views*     Narrative     LUMBAR SPINE TWO TO THREE VIEWS  2/10/2020 11:10 AM      HISTORY: Acute bilateral low back pain without sciatica.     COMPARISON: None.         Impression     IMPRESSION:   1. Alignment of the lumbar spine is within normal limits. No loss of  vertebral body height. No significant loss of intervertebral disc  space. Mild degenerative anterior spurring at L1-L2, L2-L3, and L3-L4.     2. Calcification over the left mid abdomen could represent a stone  within the left renal collecting system.     BURAK GARCIA MD         Patient's conditions were thoroughly discussed during today's visit with greater than 50% of the visit spent counseling the patient with total time spent face-to-face with the patient being 15 minutes.    Albert Yeo MD, [facility]

## 2020-03-03 NOTE — PATIENT INSTRUCTIONS
1. Acute bilateral low back pain without sciatica      -Patient is here for follow-up of low back pain due to an acute muscle strain  -Patient has been improving with physical therapy and light duty at work  -Patient will continue physical therapy and home exercise program  -Patient will continue with anti-inflammatories and muscle relaxers as needed  -Patient will continue with light duty until 3/20/2020 where he can return full duty as tolerated  -Call direct clinic number [[phone]] at any time with questions or concerns.    Albert Yeo MD CAQSM  [facility]  [facility] [place]

## 2020-03-03 NOTE — LETTER
"    3/3/2020         RE: Sadie Olea  [address]  [place] [address]        Dear Colleague,    Thank you for referring your patient, Sadie Olea, to the [facility]. Please see a copy of my visit note below.    ASSESSMENT & PLAN  Patient Instructions     1. Acute bilateral low back pain without sciatica      -Patient is here for follow-up of low back pain due to an acute muscle strain  -Patient has been improving with physical therapy and light duty at work  -Patient will continue physical therapy and home exercise program  -Patient will continue with anti-inflammatories and muscle relaxers as needed  -Patient will continue with light duty until 3/20/2020 where he can return full duty as tolerated  -Call direct clinic number [[phone]] at any time with questions or concerns.    Albert Yeo MD [facility]  [facility]          -----    SUBJECTIVE:  Sadie Olea is a 64 year old male who is seen in follow-up for low back pain due to muscle strainThey were last seen 2/10/2020.     Since their last visit reports 75% improvement. Still has pain on the right side. Still has pain with lifting and twisting.   They indicate that their current pain level is 3/10. They have tried rest/activity avoidance, other medications: Diclofenac and Flexeril, home exercises and physical therapy (2 visits).      The patient is seen by themselves.    Patient's past medical, surgical, social, and family histories were reviewed today and no changes are noted.    REVIEW OF SYSTEMS:  Constitutional: NEGATIVE for fever, chills, change in weight  Skin: NEGATIVE for worrisome rashes, moles or lesions  GI/: NEGATIVE for bowel or bladder changes  Neuro: NEGATIVE for weakness, dizziness or paresthesias    OBJECTIVE:  /78   Ht 1.778 m (5' 10\")   Wt 77.1 kg (170 lb)   BMI 24.39 kg/m      General: healthy, alert and in no distress  HEENT: no scleral icterus " or conjunctival erythema  Skin: no suspicious lesions or rash. No jaundice.  CV: regular rhythm by palpation, no pedal edema  Resp: normal respiratory effort without conversational dyspnea   Psych: normal mood and affect  Gait: normal steady gait with appropriate coordination and balance  Neuro: normal light touch sensory exam of the extremities.    MSK:  THORACIC/LUMBAR SPINE  Inspection:    No gross deformity/asymmetry  Palpation:    Tender about the lumbar spinous processes and right para lumbar muscles. Otherwise remainder of landmarks are nontender.  Range of Motion:     Lumbar flexion limited slightly by pain limited by tightness    Lumbar extension limited slightly by pain limited by tightness  Strength:    Full strength throughout hips/quads/hamstrings  Special Tests:    Positive: none    Negative: straight leg raise (bilateral), slump test (bilateral), femoral stretch test (bilateral)    Independent visualization of the below image:  Recent Results (from the past 24 hour(s))   XR Lumbar Spine 2-3 Views*     Narrative     LUMBAR SPINE TWO TO THREE VIEWS  2/10/2020 11:10 AM      HISTORY: Acute bilateral low back pain without sciatica.     COMPARISON: None.         Impression     IMPRESSION:   1. Alignment of the lumbar spine is within normal limits. No loss of  vertebral body height. No significant loss of intervertebral disc  space. Mild degenerative anterior spurring at L1-L2, L2-L3, and L3-L4.     2. Calcification over the left mid abdomen could represent a stone  within the left renal collecting system.     BURAK GARCIA MD         Patient's conditions were thoroughly discussed during today's visit with greater than 50% of the visit spent counseling the patient with total time spent face-to-face with the patient being 15 minutes.    Albert Yeo MD, [facility]          Again, thank you for allowing me to participate in the care of your patient.        Sincerely,        Armin  Yeo, MD

## 2020-03-03 NOTE — LETTER
March 3, 2020      Sadie Olea  [address]  [place] [address]        To Whom It May Concern:    Sadie Olea was seen in our clinic. He may return to work with the following: limited to light duty - lifting no greater than 15lb pounds until 3/20/2020.      Sincerely,        Albert Yeo, MD

## 2020-03-17 ENCOUNTER — TELEPHONE (OUTPATIENT)
Dept: ORTHOPEDICS | Facility: CLINIC | Age: 65
End: 2020-03-17

## 2020-03-17 NOTE — TELEPHONE ENCOUNTER
LVM for patient to return call to discuss upcoming appt.    Left triage number    Karen Mayo ATC

## 2020-03-17 NOTE — TELEPHONE ENCOUNTER
Patient LVM returning call from [facility]. He requests call back for rescheduling of appointment with Dr. Yeo.      Royer Zamarripa, BIANKA

## 2020-03-18 NOTE — TELEPHONE ENCOUNTER
Patient called back and spoke to him about his options regarding his appointment. He would really like to be seen in person and opted to reschedule 4 weeks out.    Rescheduled to 4/20    Karen Huber ATC

## 2020-03-18 NOTE — TELEPHONE ENCOUNTER
LVM for patient asking if a telephone visit would work for him. Requested a return call to discuss and get appointment figured out. Left triage number.    Karen Huber ATC

## 2020-03-20 ENCOUNTER — TELEPHONE (OUTPATIENT)
Dept: ORTHOPEDICS | Facility: CLINIC | Age: 65
End: 2020-03-20

## 2020-03-20 NOTE — TELEPHONE ENCOUNTER
Sadie Olea is a 64 year old male who left a voicemail yesterday stating he is not able to see Dr. Yeo until April. His work restrictions end 3/30/20. Difficult to understand what patient is asking in message but may be asking for a new letter to RTW without restrictions and return to full duty.     Patient expecting call back: YES      Preferred contact number:  [phone]   Can we leave a detailed message on this number: NO consent on file    Left message asking for a return call.     ELIAZAR Mccarthy RN

## 2020-03-20 NOTE — TELEPHONE ENCOUNTER
Patient calls again returning Elly's call.   Informed of recommendation for phone visit. Patient states he is not able to do a phone visit if there is a charge.     Per Dr. Yeo, we will call patient back.     Patient can be reached AFTER 3:20 at: [phone]  Ok to leave message : YES    Please call patient after 3:20    ELIAZAR Mccarthy RN

## 2020-03-20 NOTE — TELEPHONE ENCOUNTER
Patient was called  Back.  Patient states he no longer has pain and would like to return to work without restrictions.  A note was left for him a the  to .

## 2020-03-20 NOTE — TELEPHONE ENCOUNTER
Called and talked with patient. States he is feeling a lot better, and would like a new work note with all of his restrictions taken off. He states he has a visit set up with Dr. Yeo 4/20/2020. So if he needs new work restrictions, he will ask for them as needed.     Talked with Dr. Yeo, we will try and get him scheduled for an official telephone visit.     Called and LVM with patient to call us back to try and get scheduled either today or Monday for a phone visit.     Will send patient a [employer] message as well.     Elly Abreu MS, ATC

## 2020-03-20 NOTE — LETTER
March 20, 2020      Sadie Olea  [address] [place] [address]        To Whom It May Concern:    Sadie Olea was seen in our clinic. He may return to work without restrictions.      Sincerely,        Albert Yeo, MD

## 2020-03-20 NOTE — TELEPHONE ENCOUNTER
Patient left voicemail returning Elly's call.     He can be reached at: [phone].     ELIAZAR Mccarthy RN

## 2020-04-07 PROBLEM — M54.50 ACUTE LOW BACK PAIN: Status: RESOLVED | Noted: 2020-02-11 | Resolved: 2020-04-07

## 2020-04-07 NOTE — PROGRESS NOTES
Discharge Note    Progress reporting period is from initial evaluation date (please see noted date below) to Feb 19, 2020.  Linked Episodes   Type: Episode: Status: Noted: Resolved: Last update: Updated by:   PHYSICAL THERAPY LBP 2/11/2020 Active 2/11/2020 2/19/2020  3:07 PM Aide Perez, FAMILIA      Comments:       Sadie failed to follow up and current status is unknown.  Please see information below for last relevant information on current status.  Patient seen for 2 visits.    SUBJECTIVE  Subjective changes noted by patient:  Back is feeling better, pain is still there but not as bad. Feels he is moving better. 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 Return to work 2/26.  .  Current pain level is  .     Previous pain level was  5/10.   Changes in function:  Yes (See Goal flowsheet attached for changes in current functional level)  Adverse reaction to treatment or activity: None    OBJECTIVE  Changes noted in objective findings: AROM Lumbar Flx: knees, Ext: 50%     ASSESSMENT/PLAN  Diagnosis: acute low back pain   Updated problem list and treatment plan:   Pain - HEP  Decreased ROM/flexibility - HEP  STG/LTGs have been met or progress has been made towards goals:  Yes, please see goal flowsheet for most current information  Assessment of Progress: current status is unknown.    Last current status:     Self Management Plans:  HEP  I have re-evaluated this patient and find that the nature, scope, duration and intensity of the therapy is appropriate for the medical condition of the patient.  Sadie continues to require the following intervention to meet STG and LTG's:  HEP.    Recommendations:  Discharge with current home program.  Patient to follow up with MD as needed.    Please refer to the daily flowsheet for treatment today, total treatment time and time spent performing 1:1 timed codes.

## 2020-04-14 ENCOUNTER — TELEPHONE (OUTPATIENT)
Dept: ORTHOPEDICS | Facility: CLINIC | Age: 65
End: 2020-04-14

## 2020-04-14 NOTE — TELEPHONE ENCOUNTER
Left voicemail for patient to return call.   Patient was provided with work letter on 3/20/20 returning him to work with no restrictions.     Will need to Triage if able to have Virtual Visit when patient returns call.     Ibis Ruiz, ATC

## 2020-07-14 DIAGNOSIS — B18.1 CHRONIC VIRAL HEPATITIS B WITHOUT DELTA AGENT AND WITHOUT COMA (H): ICD-10-CM

## 2020-07-14 LAB
ERYTHROCYTE [DISTWIDTH] IN BLOOD BY AUTOMATED COUNT: 12.6 % (ref 10–15)
HCT VFR BLD AUTO: 36 % (ref 40–53)
HGB BLD-MCNC: 12.4 G/DL (ref 13.3–17.7)
INR PPP: 1.19 (ref 0.86–1.14)
MCH RBC QN AUTO: 31.3 PG (ref 26.5–33)
MCHC RBC AUTO-ENTMCNC: 34.4 G/DL (ref 31.5–36.5)
MCV RBC AUTO: 91 FL (ref 78–100)
PLATELET # BLD AUTO: 150 10E9/L (ref 150–450)
RBC # BLD AUTO: 3.96 10E12/L (ref 4.4–5.9)
WBC # BLD AUTO: 4 10E9/L (ref 4–11)

## 2020-07-14 PROCEDURE — 80048 BASIC METABOLIC PNL TOTAL CA: CPT | Performed by: PHYSICIAN ASSISTANT

## 2020-07-14 PROCEDURE — 85610 PROTHROMBIN TIME: CPT | Performed by: PHYSICIAN ASSISTANT

## 2020-07-14 PROCEDURE — 80076 HEPATIC FUNCTION PANEL: CPT | Performed by: PHYSICIAN ASSISTANT

## 2020-07-14 PROCEDURE — 36415 COLL VENOUS BLD VENIPUNCTURE: CPT | Performed by: PHYSICIAN ASSISTANT

## 2020-07-14 PROCEDURE — 87517 HEPATITIS B DNA QUANT: CPT | Performed by: PHYSICIAN ASSISTANT

## 2020-07-14 PROCEDURE — 85027 COMPLETE CBC AUTOMATED: CPT | Performed by: PHYSICIAN ASSISTANT

## 2020-07-15 LAB
ALBUMIN SERPL-MCNC: 3.8 G/DL (ref 3.4–5)
ALP SERPL-CCNC: 55 U/L (ref 40–150)
ALT SERPL W P-5'-P-CCNC: 32 U/L (ref 0–70)
ANION GAP SERPL CALCULATED.3IONS-SCNC: 4 MMOL/L (ref 3–14)
AST SERPL W P-5'-P-CCNC: 23 U/L (ref 0–45)
BILIRUB DIRECT SERPL-MCNC: 0.2 MG/DL (ref 0–0.2)
BILIRUB SERPL-MCNC: 1 MG/DL (ref 0.2–1.3)
BUN SERPL-MCNC: 15 MG/DL (ref 7–30)
CALCIUM SERPL-MCNC: 8.4 MG/DL (ref 8.5–10.1)
CHLORIDE SERPL-SCNC: 109 MMOL/L (ref 94–109)
CO2 SERPL-SCNC: 27 MMOL/L (ref 20–32)
CREAT SERPL-MCNC: 0.94 MG/DL (ref 0.66–1.25)
GFR SERPL CREATININE-BSD FRML MDRD: 86 ML/MIN/{1.73_M2}
GLUCOSE SERPL-MCNC: 91 MG/DL (ref 70–99)
POTASSIUM SERPL-SCNC: 3.7 MMOL/L (ref 3.4–5.3)
PROT SERPL-MCNC: 7.3 G/DL (ref 6.8–8.8)
SODIUM SERPL-SCNC: 140 MMOL/L (ref 133–144)

## 2020-07-17 LAB
HBV DNA SERPL NAA+PROBE-ACNC: 217 [IU]/ML
HBV DNA SERPL NAA+PROBE-LOG IU: 2.3 {LOG_IU}/ML

## 2020-07-30 ENCOUNTER — HOSPITAL ENCOUNTER (OUTPATIENT)
Dept: ULTRASOUND IMAGING | Facility: CLINIC | Age: 65
Discharge: HOME | End: 2020-07-30
Attending: PHYSICIAN ASSISTANT | Admitting: PHYSICIAN ASSISTANT
Payer: COMMERCIAL

## 2020-07-30 DIAGNOSIS — B18.1 CHRONIC VIRAL HEPATITIS B WITHOUT DELTA AGENT AND WITHOUT COMA (H): ICD-10-CM

## 2020-07-30 PROCEDURE — 76700 US EXAM ABDOM COMPLETE: CPT

## 2020-08-05 ENCOUNTER — OFFICE VISIT (OUTPATIENT)
Dept: URGENT CARE | Facility: URGENT CARE | Age: 65
End: 2020-08-05
Payer: COMMERCIAL

## 2020-08-05 ENCOUNTER — RESULTS ONLY (OUTPATIENT)
Dept: LAB | Age: 65
End: 2020-08-05

## 2020-08-05 DIAGNOSIS — Z53.9 ERRONEOUS ENCOUNTER--DISREGARD: Primary | ICD-10-CM

## 2020-08-06 LAB
SARS-COV-2 RNA SPEC QL NAA+PROBE: NOT DETECTED
SPECIMEN SOURCE: NORMAL

## 2020-08-19 ENCOUNTER — TELEPHONE (OUTPATIENT)
Dept: GASTROENTEROLOGY | Facility: CLINIC | Age: 65
End: 2020-08-19

## 2020-08-24 ENCOUNTER — VIRTUAL VISIT (OUTPATIENT)
Dept: GASTROENTEROLOGY | Facility: CLINIC | Age: 65
End: 2020-08-24
Attending: PHYSICIAN ASSISTANT
Payer: COMMERCIAL

## 2020-08-24 DIAGNOSIS — B18.1 CHRONIC VIRAL HEPATITIS B WITHOUT DELTA AGENT AND WITHOUT COMA (H): Primary | ICD-10-CM

## 2020-08-24 RX ORDER — TENOFOVIR DISOPROXIL FUMARATE 300 MG/1
300 TABLET, FILM COATED ORAL DAILY
Qty: 30 TABLET | Refills: 11 | Status: SHIPPED | OUTPATIENT
Start: 2020-08-24 | End: 2020-11-05

## 2020-08-24 ASSESSMENT — PAIN SCALES - GENERAL: PAINLEVEL: NO PAIN (0)

## 2020-08-24 NOTE — LETTER
"    8/24/2020         RE: Sadie Olea  [address]  [place] [address]        Dear Colleague,    Thank you for referring your patient, Sadie Olea, to the [facility]. Please see a copy of my visit note below.    Sadie Olea is a 65 year old male who is being evaluated via a billable video visit.      The patient has been notified of following:     \"This video visit will be conducted via a call between you and your physician/provider. We have found that certain health care needs can be provided without the need for an in-person physical exam.  This service lets us provide the care you need with a video conversation.  If a prescription is necessary we can send it directly to your pharmacy.  If lab work is needed we can place an order for that and you can then stop by our lab to have the test done at a later time.    Video visits are billed at different rates depending on your insurance coverage.  Please reach out to your insurance provider with any questions.    If during the course of the call the physician/provider feels a video visit is not appropriate, you will not be charged for this service.\"    Patient has given verbal consent for Video visit? Yes  How would you like to obtain your AVS? MyChart  Will anyone else be joining your video visit? No        Video-Visit Details    Type of service:  Video Visit    Video Start Time: 10:33  Video End Time:: 10:48  Video froze: remainder of visits on phone: 12 minutes    Originating Location (pt. Location): Home    Distant Location (provider location):  [facility]     Platform used for Video Visit:     Seth Nguyen PA-C    Hepatology Clinic note  Sadie Olea   Date of Birth 1955  Date of Service 8/24/2020         Assessment/plan:   Sadie Olea is a 65 year old male with chronic hepatitis B, e antigen negative and e antibody positive, who has been maintained on Vemlidy (Tenofovir AF) for active disease. He has remained compliant with " his medications. His transaminases remain normal and his HBV DNA has been low correlating with compliance. Fibrosis scan in 8/2019 showing F0-F1, He is up to date on HCC screening.     He has been experiencing bilateral lower abdomen pain that has been persistent for the past few months. Aside from flatulence, no other abdominal symptoms. Eating well. He is up to date with colonoscopy. He is going to monitor his diet more closely. He does have some mild urinary symptoms and was recommended to follow up with PCP. He had bilateral non-obstructing renal stones on imaging, discussing increasing fluid intake to 2L.     - Continue Viread 300 mg daily   - HCC screening with US abdomen limited in 5 months  - Follow up with PCP to monitor hemoglobin and discuss lower abdomen pain if it persists  - Obtain outside records of [facility] colonoscopy   - BMP, Hepatic panel, CBC, INR and HBV DNA  - Follow-up in clinic in one year or sooner as needed    Seth Nguyen PA-C   [facility]    -----------------------------------------------------       HPI:   Sadie Olea is a 65 year old male  presenting for the follow-up.    Hepatitis B  E antigen positive (8/22/2018)  E antibody negative 98/22/2018)   -Diagnosed: 2009  -History: ? Jaundice at age 42   -MRE 2011: normal fibrosis   Fibrosis scan: 8/26/2019: F0-F1, 5.9 kilopascals  -Prior treatments:   Started on tenofovir DF in 2015 due to 10 year HCC risk   Changed to tenofovir AF in May 2019  Changed back to tenofovir DF due to insurance    Patient was last seen by me on 8/26/2019. No recent hospitalizations or ER Visits. He denies any new medications.     For the past few months, he notes bilateral lower abdominal pain. He denies any changes in appetite. His weight is down about 4-5 lbs in the last six months.. He has regular bowel movements. He does consume of a lot of vegetables, fruits, rice and chicken and occasional beef. Drinks milk once daily and  yogurt. Denies lactulose intolerance. Does experience flatulence. He has also be experiencing lower back pain. He does urinate every few hours. Sometimes his flow is decreased. Drinks about 1 L of fluild a day.     Patient denies jaundice, lower extremity edema, abdominal distension or confusion.  Patient also denies melena, hematochezia or hematemesis. Patient denies  fevers, sweats or chills.    He does not drink alcohol. 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     Medical hx Surgical hx   Past Medical History:   Diagnosis Date     GERD (gastroesophageal reflux disease)      HBV (hepatitis B virus) infection      Hyperlipidemia     No past surgical history on file.              Medications:     Current Outpatient Medications   Medication     tenofovir (VIREAD) 300 MG tablet     cyclobenzaprine (FLEXERIL) 10 MG tablet     diclofenac (VOLTAREN) 75 MG EC tablet     No current facility-administered medications for this visit.             Allergies:   No Known Allergies         Review of Systems:   10 points ROS was obtained and highlighted in the HPI, otherwise negative.          Physical Exam:     GENERAL: healthy, alert and no distress  EYES: Eyes grossly normal to inspection, conjunctivae and sclerae normal  RESP: no audible wheeze, cough, or visible cyanosis.  No visible retractions or increased work of breathing.  Able to speak fully in complete sentences  NEURO: Cranial nerves grossly intact, mentation intact and speech normal  PSYCH: mentation appears normal, affect normal/bright, judgement and insight intact, normal speech and appearance well-groomed         Data:   Reviewed in person and significant for:    Lab Results   Component Value Date     07/14/2020      Lab Results   Component Value Date    POTASSIUM 3.7 07/14/2020     Lab Results   Component Value Date    CHLORIDE 109 07/14/2020     Lab Results   Component Value Date    CO2 27 07/14/2020     Lab Results   Component Value Date    BUN 15  07/14/2020     Lab Results   Component Value Date    CR 0.94 07/14/2020       Lab Results   Component Value Date    WBC 4.0 07/14/2020     Lab Results   Component Value Date    HGB 12.4 07/14/2020     Lab Results   Component Value Date    HCT 36.0 07/14/2020     Lab Results   Component Value Date    MCV 91 07/14/2020     Lab Results   Component Value Date     07/14/2020       Lab Results   Component Value Date    AST 23 07/14/2020     Lab Results   Component Value Date    ALT 32 07/14/2020     No results found for: BILICONJ   Lab Results   Component Value Date    BILITOTAL 1.0 07/14/2020       Lab Results   Component Value Date    ALBUMIN 3.8 07/14/2020     Lab Results   Component Value Date    PROTTOTAL 7.3 07/14/2020      Lab Results   Component Value Date    ALKPHOS 55 07/14/2020       Lab Results   Component Value Date    INR 1.19 07/14/2020     US ABDOMEN COMPLETE:   7/30/2020:                                                                    IMPRESSION:    1. Cholelithiasis without ultrasound evidence of cholecystitis.  2. Bilateral renal cysts and possible tiny nonobstructing renal  collecting system stones. No hydronephrosis.        Again, thank you for allowing me to participate in the care of your patient.        Sincerely,        Seth Nguyen PA-C

## 2020-08-24 NOTE — PROGRESS NOTES
"Sadie Olea is a 65 year old male who is being evaluated via a billable video visit.      The patient has been notified of following:     \"This video visit will be conducted via a call between you and your physician/provider. We have found that certain health care needs can be provided without the need for an in-person physical exam.  This service lets us provide the care you need with a video conversation.  If a prescription is necessary we can send it directly to your pharmacy.  If lab work is needed we can place an order for that and you can then stop by our lab to have the test done at a later time.    Video visits are billed at different rates depending on your insurance coverage.  Please reach out to your insurance provider with any questions.    If during the course of the call the physician/provider feels a video visit is not appropriate, you will not be charged for this service.\"    Patient has given verbal consent for Video visit? Yes  How would you like to obtain your AVS? MyChart  Will anyone else be joining your video visit? No        Video-Visit Details    Type of service:  Video Visit    Video Start Time: 10:33  Video End Time:: 10:48  Video froze: remainder of visits on phone: 12 minutes    Originating Location (pt. Location): Home    Distant Location (provider location):  [facility]     Platform used for Video Visit:     Seth Nguyen PA-C    Hepatology Clinic note  Sadie Olea   Date of Birth 1955  Date of Service 8/24/2020         Assessment/plan:   Sadie Olea is a 65 year old male with chronic hepatitis B, e antigen negative and e antibody positive, who has been maintained on Vemlidy (Tenofovir AF) for active disease. He has remained compliant with his medications. His transaminases remain normal and his HBV DNA has been low correlating with compliance. Fibrosis scan in 8/2019 showing F0-F1, He is up to date on HCC screening.     He has been experiencing bilateral lower " abdomen pain that has been persistent for the past few months. Aside from flatulence, no other abdominal symptoms. Eating well. He is up to date with colonoscopy. He is going to monitor his diet more closely. He does have some mild urinary symptoms and was recommended to follow up with PCP. He had bilateral non-obstructing renal stones on imaging, discussing increasing fluid intake to 2L.     - Continue Viread 300 mg daily   - HCC screening with US abdomen limited in 5 months  - Follow up with PCP to monitor hemoglobin and discuss lower abdomen pain if it persists  - Obtain outside records of [facility] colonoscopy   - BMP, Hepatic panel, CBC, INR and HBV DNA  - Follow-up in clinic in one year or sooner as needed    Seth Nguyen PA-C   [facility]    -----------------------------------------------------       HPI:   Sadie Olea is a 65 year old male  presenting for the follow-up.    Hepatitis B  E antigen positive (8/22/2018)  E antibody negative 98/22/2018)   -Diagnosed: 2009  -History: ? Jaundice at age 42   -MRE 2011: normal fibrosis   Fibrosis scan: 8/26/2019: F0-F1, 5.9 kilopascals  -Prior treatments:   Started on tenofovir DF in 2015 due to 10 year HCC risk   Changed to tenofovir AF in May 2019  Changed back to tenofovir DF due to insurance    Patient was last seen by me on 8/26/2019. No recent hospitalizations or ER Visits. He denies any new medications.     For the past few months, he notes bilateral lower abdominal pain. He denies any changes in appetite. His weight is down about 4-5 lbs in the last six months.. He has regular bowel movements. He does consume of a lot of vegetables, fruits, rice and chicken and occasional beef. Drinks milk once daily and yogurt. Denies lactulose intolerance. Does experience flatulence. He has also be experiencing lower back pain. He does urinate every few hours. Sometimes his flow is decreased. Drinks about 1 L of fluild a day.     Patient denies  jaundice, lower extremity edema, abdominal distension or confusion.  Patient also denies melena, hematochezia or hematemesis. Patient denies  fevers, sweats or chills.    He does not drink alcohol. 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     Medical hx Surgical hx   Past Medical History:   Diagnosis Date     GERD (gastroesophageal reflux disease)      HBV (hepatitis B virus) infection      Hyperlipidemia     No past surgical history on file.              Medications:     Current Outpatient Medications   Medication     tenofovir (VIREAD) 300 MG tablet     cyclobenzaprine (FLEXERIL) 10 MG tablet     diclofenac (VOLTAREN) 75 MG EC tablet     No current facility-administered medications for this visit.             Allergies:   No Known Allergies         Review of Systems:   10 points ROS was obtained and highlighted in the HPI, otherwise negative.          Physical Exam:     GENERAL: healthy, alert and no distress  EYES: Eyes grossly normal to inspection, conjunctivae and sclerae normal  RESP: no audible wheeze, cough, or visible cyanosis.  No visible retractions or increased work of breathing.  Able to speak fully in complete sentences  NEURO: Cranial nerves grossly intact, mentation intact and speech normal  PSYCH: mentation appears normal, affect normal/bright, judgement and insight intact, normal speech and appearance well-groomed         Data:   Reviewed in person and significant for:    Lab Results   Component Value Date     07/14/2020      Lab Results   Component Value Date    POTASSIUM 3.7 07/14/2020     Lab Results   Component Value Date    CHLORIDE 109 07/14/2020     Lab Results   Component Value Date    CO2 27 07/14/2020     Lab Results   Component Value Date    BUN 15 07/14/2020     Lab Results   Component Value Date    CR 0.94 07/14/2020       Lab Results   Component Value Date    WBC 4.0 07/14/2020     Lab Results   Component Value Date    HGB 12.4 07/14/2020     Lab Results   Component Value  Date    HCT 36.0 07/14/2020     Lab Results   Component Value Date    MCV 91 07/14/2020     Lab Results   Component Value Date     07/14/2020       Lab Results   Component Value Date    AST 23 07/14/2020     Lab Results   Component Value Date    ALT 32 07/14/2020     No results found for: BILICONJ   Lab Results   Component Value Date    BILITOTAL 1.0 07/14/2020       Lab Results   Component Value Date    ALBUMIN 3.8 07/14/2020     Lab Results   Component Value Date    PROTTOTAL 7.3 07/14/2020      Lab Results   Component Value Date    ALKPHOS 55 07/14/2020       Lab Results   Component Value Date    INR 1.19 07/14/2020         US ABDOMEN COMPLETE:   7/30/2020:                                                                    IMPRESSION:    1. Cholelithiasis without ultrasound evidence of cholecystitis.  2. Bilateral renal cysts and possible tiny nonobstructing renal  collecting system stones. No hydronephrosis.

## 2020-11-05 DIAGNOSIS — B18.1 CHRONIC VIRAL HEPATITIS B WITHOUT DELTA AGENT AND WITHOUT COMA (H): ICD-10-CM

## 2020-11-05 RX ORDER — TENOFOVIR DISOPROXIL FUMARATE 300 MG/1
300 TABLET, FILM COATED ORAL DAILY
Qty: 30 TABLET | Refills: 11 | Status: SHIPPED | OUTPATIENT
Start: 2020-11-05 | End: 2021-10-11

## 2020-11-22 ENCOUNTER — HEALTH MAINTENANCE LETTER (OUTPATIENT)
Age: 65
End: 2020-11-22

## 2020-12-22 ENCOUNTER — HOSPITAL ENCOUNTER (OUTPATIENT)
Dept: ULTRASOUND IMAGING | Facility: CLINIC | Age: 65
Discharge: HOME | End: 2020-12-22
Attending: PHYSICIAN ASSISTANT | Admitting: PHYSICIAN ASSISTANT
Payer: COMMERCIAL

## 2020-12-22 DIAGNOSIS — B18.1 CHRONIC VIRAL HEPATITIS B WITHOUT DELTA AGENT AND WITHOUT COMA (H): ICD-10-CM

## 2020-12-22 PROCEDURE — 76705 ECHO EXAM OF ABDOMEN: CPT

## 2020-12-29 ENCOUNTER — HOSPITAL ENCOUNTER (OUTPATIENT)
Dept: LAB | Facility: CLINIC | Age: 65
Discharge: HOME | End: 2020-12-29
Attending: STUDENT IN AN ORGANIZED HEALTH CARE EDUCATION/TRAINING PROGRAM | Admitting: PHYSICIAN ASSISTANT
Payer: COMMERCIAL

## 2020-12-29 DIAGNOSIS — B18.1 CHRONIC VIRAL HEPATITIS B WITHOUT DELTA AGENT AND WITHOUT COMA (H): ICD-10-CM

## 2020-12-29 LAB
ALBUMIN SERPL-MCNC: 3.9 G/DL (ref 3.4–5)
ALP SERPL-CCNC: 58 U/L (ref 40–150)
ALT SERPL W P-5'-P-CCNC: 24 U/L (ref 0–70)
ANION GAP SERPL CALCULATED.3IONS-SCNC: 3 MMOL/L (ref 3–14)
AST SERPL W P-5'-P-CCNC: 17 U/L (ref 0–45)
BILIRUB DIRECT SERPL-MCNC: 0.2 MG/DL (ref 0–0.2)
BILIRUB SERPL-MCNC: 0.8 MG/DL (ref 0.2–1.3)
BUN SERPL-MCNC: 20 MG/DL (ref 7–30)
CALCIUM SERPL-MCNC: 8.5 MG/DL (ref 8.5–10.1)
CHLORIDE SERPL-SCNC: 108 MMOL/L (ref 94–109)
CO2 SERPL-SCNC: 29 MMOL/L (ref 20–32)
CREAT SERPL-MCNC: 0.87 MG/DL (ref 0.66–1.25)
ERYTHROCYTE [DISTWIDTH] IN BLOOD BY AUTOMATED COUNT: 13.1 % (ref 10–15)
GFR SERPL CREATININE-BSD FRML MDRD: >90 ML/MIN/{1.73_M2}
GLUCOSE SERPL-MCNC: 110 MG/DL (ref 70–99)
HCT VFR BLD AUTO: 36.7 % (ref 40–53)
HGB BLD-MCNC: 12.9 G/DL (ref 13.3–17.7)
INR PPP: 1.08 (ref 0.86–1.14)
MCH RBC QN AUTO: 32.4 PG (ref 26.5–33)
MCHC RBC AUTO-ENTMCNC: 35.1 G/DL (ref 31.5–36.5)
MCV RBC AUTO: 92 FL (ref 78–100)
PLATELET # BLD AUTO: 171 10E9/L (ref 150–450)
POTASSIUM SERPL-SCNC: 3.6 MMOL/L (ref 3.4–5.3)
PROT SERPL-MCNC: 7.4 G/DL (ref 6.8–8.8)
RBC # BLD AUTO: 3.98 10E12/L (ref 4.4–5.9)
SODIUM SERPL-SCNC: 140 MMOL/L (ref 133–144)
WBC # BLD AUTO: 4.8 10E9/L (ref 4–11)

## 2020-12-29 PROCEDURE — 85027 COMPLETE CBC AUTOMATED: CPT | Performed by: PHYSICIAN ASSISTANT

## 2020-12-29 PROCEDURE — 80076 HEPATIC FUNCTION PANEL: CPT | Performed by: PHYSICIAN ASSISTANT

## 2020-12-29 PROCEDURE — 85610 PROTHROMBIN TIME: CPT | Performed by: PHYSICIAN ASSISTANT

## 2020-12-29 PROCEDURE — 87517 HEPATITIS B DNA QUANT: CPT | Performed by: PHYSICIAN ASSISTANT

## 2020-12-29 PROCEDURE — 36415 COLL VENOUS BLD VENIPUNCTURE: CPT | Performed by: PHYSICIAN ASSISTANT

## 2020-12-29 PROCEDURE — 80048 BASIC METABOLIC PNL TOTAL CA: CPT | Performed by: PHYSICIAN ASSISTANT

## 2020-12-29 NOTE — LETTER
January 5, 2021       TO: Sadie Olea  [address]  [facility] [address]       Dear Mr. Olea,    We are writing to inform you of your test results.    Your liver function is normal.     Your Hep B virus level is low. Continue taking the medical regularly.     Your hemoglobin is 12.9.     Your kidney function is normal.     It was a pleasure to see you at your recent visit. Please let me know if you have any questions or concerns.     Clinic Staff - [phone] option [phone]     Sincerely,     CHRISTIE Reyes  [address] [place] [address], Mail [id number]  [place]  [address].

## 2020-12-30 LAB
HBV DNA SERPL NAA+PROBE-ACNC: 208 [IU]/ML
HBV DNA SERPL NAA+PROBE-LOG IU: 2.3 {LOG_IU}/ML

## 2021-02-16 ENCOUNTER — APPOINTMENT (OUTPATIENT)
Dept: ULTRASOUND IMAGING | Facility: CLINIC | Age: 66
End: 2021-02-16
Attending: EMERGENCY MEDICINE
Payer: COMMERCIAL

## 2021-02-16 ENCOUNTER — HOSPITAL ENCOUNTER (EMERGENCY)
Facility: CLINIC | Age: 66
Discharge: HOME | End: 2021-02-16
Attending: EMERGENCY MEDICINE | Admitting: EMERGENCY MEDICINE
Payer: COMMERCIAL

## 2021-02-16 VITALS
WEIGHT: 164.38 LBS | HEART RATE: 59 BPM | OXYGEN SATURATION: 97 % | RESPIRATION RATE: 10 BRPM | BODY MASS INDEX: 23.59 KG/M2 | TEMPERATURE: 97.5 F | SYSTOLIC BLOOD PRESSURE: 135 MMHG | DIASTOLIC BLOOD PRESSURE: 93 MMHG

## 2021-02-16 DIAGNOSIS — K83.8 DILATION OF BILIARY TRACT: ICD-10-CM

## 2021-02-16 DIAGNOSIS — K85.10 GALLSTONE PANCREATITIS: ICD-10-CM

## 2021-02-16 DIAGNOSIS — K80.70 CALCULUS OF GALLBLADDER AND BILE DUCT WITHOUT CHOLECYSTITIS OR OBSTRUCTION: ICD-10-CM

## 2021-02-16 LAB
ALBUMIN SERPL-MCNC: 3.9 G/DL (ref 3.4–5)
ALP SERPL-CCNC: 78 U/L (ref 40–150)
ALT SERPL W P-5'-P-CCNC: 119 U/L (ref 0–70)
ANION GAP SERPL CALCULATED.3IONS-SCNC: 4 MMOL/L (ref 3–14)
AST SERPL W P-5'-P-CCNC: 219 U/L (ref 0–45)
BASOPHILS # BLD AUTO: 0 10E9/L (ref 0–0.2)
BASOPHILS NFR BLD AUTO: 0.5 %
BILIRUB SERPL-MCNC: 0.8 MG/DL (ref 0.2–1.3)
BUN SERPL-MCNC: 24 MG/DL (ref 7–30)
CALCIUM SERPL-MCNC: 8.4 MG/DL (ref 8.5–10.1)
CHLORIDE SERPL-SCNC: 108 MMOL/L (ref 94–109)
CO2 SERPL-SCNC: 29 MMOL/L (ref 20–32)
CREAT SERPL-MCNC: 0.9 MG/DL (ref 0.66–1.25)
DIFFERENTIAL METHOD BLD: ABNORMAL
EOSINOPHIL # BLD AUTO: 0.1 10E9/L (ref 0–0.7)
EOSINOPHIL NFR BLD AUTO: 1.2 %
ERYTHROCYTE [DISTWIDTH] IN BLOOD BY AUTOMATED COUNT: 13 % (ref 10–15)
GFR SERPL CREATININE-BSD FRML MDRD: >90 ML/MIN/{1.73_M2}
GLUCOSE SERPL-MCNC: 110 MG/DL (ref 70–99)
HCT VFR BLD AUTO: 38.6 % (ref 40–53)
HGB BLD-MCNC: 12.8 G/DL (ref 13.3–17.7)
IMM GRANULOCYTES # BLD: 0 10E9/L (ref 0–0.4)
IMM GRANULOCYTES NFR BLD: 0.3 %
INTERPRETATION ECG - MUSE: NORMAL
LIPASE SERPL-CCNC: 9401 U/L (ref 73–393)
LYMPHOCYTES # BLD AUTO: 1.2 10E9/L (ref 0.8–5.3)
LYMPHOCYTES NFR BLD AUTO: 21 %
MCH RBC QN AUTO: 31.6 PG (ref 26.5–33)
MCHC RBC AUTO-ENTMCNC: 33.2 G/DL (ref 31.5–36.5)
MCV RBC AUTO: 95 FL (ref 78–100)
MONOCYTES # BLD AUTO: 0.4 10E9/L (ref 0–1.3)
MONOCYTES NFR BLD AUTO: 6.4 %
NEUTROPHILS # BLD AUTO: 4.2 10E9/L (ref 1.6–8.3)
NEUTROPHILS NFR BLD AUTO: 70.6 %
NRBC # BLD AUTO: 0 10*3/UL
NRBC BLD AUTO-RTO: 0 /100
PLATELET # BLD AUTO: 165 10E9/L (ref 150–450)
POTASSIUM SERPL-SCNC: 3.4 MMOL/L (ref 3.4–5.3)
PROT SERPL-MCNC: 7.7 G/DL (ref 6.8–8.8)
RBC # BLD AUTO: 4.05 10E12/L (ref 4.4–5.9)
SODIUM SERPL-SCNC: 141 MMOL/L (ref 133–144)
WBC # BLD AUTO: 5.9 10E9/L (ref 4–11)

## 2021-02-16 PROCEDURE — 80053 COMPREHEN METABOLIC PANEL: CPT | Performed by: EMERGENCY MEDICINE

## 2021-02-16 PROCEDURE — 96374 THER/PROPH/DIAG INJ IV PUSH: CPT

## 2021-02-16 PROCEDURE — 93005 ELECTROCARDIOGRAM TRACING: CPT

## 2021-02-16 PROCEDURE — 76705 ECHO EXAM OF ABDOMEN: CPT

## 2021-02-16 PROCEDURE — 85025 COMPLETE CBC W/AUTO DIFF WBC: CPT | Performed by: EMERGENCY MEDICINE

## 2021-02-16 PROCEDURE — 250N000013 HC RX MED GY IP 250 OP 250 PS 637: Performed by: EMERGENCY MEDICINE

## 2021-02-16 PROCEDURE — 99285 EMERGENCY DEPT VISIT HI MDM: CPT | Mod: 25

## 2021-02-16 PROCEDURE — 96361 HYDRATE IV INFUSION ADD-ON: CPT

## 2021-02-16 PROCEDURE — 258N000003 HC RX IP 258 OP 636: Performed by: EMERGENCY MEDICINE

## 2021-02-16 PROCEDURE — 83690 ASSAY OF LIPASE: CPT | Performed by: EMERGENCY MEDICINE

## 2021-02-16 PROCEDURE — 250N000009 HC RX 250: Performed by: EMERGENCY MEDICINE

## 2021-02-16 RX ORDER — MORPHINE SULFATE 4 MG/ML
4 INJECTION, SOLUTION INTRAMUSCULAR; INTRAVENOUS ONCE
Status: DISCONTINUED | OUTPATIENT
Start: 2021-02-16 | End: 2021-02-16 | Stop reason: HOSPADM

## 2021-02-16 RX ORDER — ONDANSETRON 4 MG/1
4 TABLET, ORALLY DISINTEGRATING ORAL EVERY 8 HOURS PRN
Qty: 10 TABLET | Refills: 0 | Status: SHIPPED | OUTPATIENT
Start: 2021-02-16 | End: 2021-02-18

## 2021-02-16 RX ORDER — HYDROCODONE BITARTRATE AND ACETAMINOPHEN 5; 325 MG/1; MG/1
1 TABLET ORAL EVERY 6 HOURS PRN
Qty: 15 TABLET | Refills: 0 | Status: SHIPPED | OUTPATIENT
Start: 2021-02-16 | End: 2021-02-18

## 2021-02-16 RX ADMIN — SODIUM CHLORIDE 1000 ML: 9 INJECTION, SOLUTION INTRAVENOUS at 02:41

## 2021-02-16 RX ADMIN — LIDOCAINE HYDROCHLORIDE 30 ML: 20 SOLUTION ORAL; TOPICAL at 02:41

## 2021-02-16 RX ADMIN — FAMOTIDINE 20 MG: 10 INJECTION, SOLUTION INTRAVENOUS at 02:41

## 2021-02-16 ASSESSMENT — ENCOUNTER SYMPTOMS
FEVER: 0
VOMITING: 0
NAUSEA: 0
ABDOMINAL PAIN: 1
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
## 2021-02-18 ENCOUNTER — VIRTUAL VISIT (OUTPATIENT)
Dept: SURGERY | Facility: CLINIC | Age: 66
End: 2021-02-18
Payer: COMMERCIAL

## 2021-02-18 ENCOUNTER — HOSPITAL ENCOUNTER (OUTPATIENT)
Dept: LAB | Facility: CLINIC | Age: 66
Discharge: HOME | End: 2021-02-18
Attending: SURGERY | Admitting: SURGERY
Payer: COMMERCIAL

## 2021-02-18 VITALS — BODY MASS INDEX: 23.48 KG/M2 | HEIGHT: 70 IN | WEIGHT: 164 LBS

## 2021-02-18 DIAGNOSIS — K85.10 GALLSTONE PANCREATITIS: Primary | ICD-10-CM

## 2021-02-18 DIAGNOSIS — K85.10 GALLSTONE PANCREATITIS: ICD-10-CM

## 2021-02-18 DIAGNOSIS — B18.1 HEPATITIS B, CHRONIC (H): ICD-10-CM

## 2021-02-18 LAB
ALBUMIN SERPL-MCNC: 3.7 G/DL (ref 3.4–5)
ALP SERPL-CCNC: 80 U/L (ref 40–150)
ALT SERPL W P-5'-P-CCNC: 105 U/L (ref 0–70)
AST SERPL W P-5'-P-CCNC: 41 U/L (ref 0–45)
BILIRUB DIRECT SERPL-MCNC: 0.2 MG/DL (ref 0–0.2)
BILIRUB SERPL-MCNC: 0.9 MG/DL (ref 0.2–1.3)
LIPASE SERPL-CCNC: 156 U/L (ref 73–393)
PROT SERPL-MCNC: 7.6 G/DL (ref 6.8–8.8)

## 2021-02-18 PROCEDURE — 80076 HEPATIC FUNCTION PANEL: CPT | Performed by: SURGERY

## 2021-02-18 PROCEDURE — 83690 ASSAY OF LIPASE: CPT | Performed by: SURGERY

## 2021-02-18 PROCEDURE — 36415 COLL VENOUS BLD VENIPUNCTURE: CPT | Performed by: SURGERY

## 2021-02-18 PROCEDURE — 99204 OFFICE O/P NEW MOD 45 MIN: CPT | Mod: 95 | Performed by: SURGERY

## 2021-02-18 ASSESSMENT — MIFFLIN-ST. JEOR: SCORE: 1545.15

## 2021-02-18 NOTE — PROGRESS NOTES
Sadie Olea is a 52 year old who is being evaluated via a billable video visit.        If the video visit is dropped, the invitation should be resent by: Text to cell phone: [phone]          Video Start Time: 11:20 AM     [facility]  New Patient Virtual Visit      Sadie Olea is a 65 year old male seen in consultation for Abdominal pain, epigastric at the request of [facility].       Assessment and Plan:  It is my impression that Sadie has a very recent history of gallstone pancreatitis and has possibly passed a common duct stone or sludge. I have ordered repeat lipase and LFTs and asked him to have these drawn today. Thankfully he is no longer having any symptoms. If labs are still concerning for a common bile duct stone, I will instruct him to return to the ED for admission and ERCP.     If his labs have normalized, I will offer offered him a laparoscopic cholecystectomy with intraoperative cholangiogram.      We have discussed the indication, alternatives, risks and expected recovery.  Specifically we have discussed incisions, scarring, postoperative infections, anesthesia, bleeding, blood transfusion, open conversion, common bile duct injury, injury to intra-abdominal organs, adhesions that can lead to bowel obstruction, retained common bile duct stone, bile leak, DVT, PE, hernia, post cholecystectomy diarrhea, postoperative dietary restrictions and physical limitations.  We have discussed the recommended interventions and treatments for these complications.  All questions have been answered to the best of my ability.         We will schedule surgery when the workup is complete.  Needs a preop H&P to be performed by PCP if he does not require admission for ongoing choledocholithiasis.     Chief complaint:  Abdominal pain, epigastric    HPI:  Sadie Olea is a 65 year old male who presents with intermittent epigastric pain for 3 days.  The pain is not associated with eating any type  "of food.  Positive for associated symptoms of annorexia.  Negative for associated symptoms of nausea, vomiting, diarrhea, constipation, fever and chills.  He does not have a history of jaundice or dark urine.  He has not had pancreatitis in the past. He has a history of hepatitis B and has been undergoing treatment.     He presented to the ED 2 nights ago with sudden onset of epigastric pain. The pain waxed and wanes throughout the night and he went to the ED when it because very severe. Workup revealed elevated lipase to 9K, and a dilated common bile duct to 13 mm. His RUQ showed gallstones, as it has for several years. He elected to go home from the ED despite recommendations that he be admitted for possible ERCP and lap osvaldo. Since his stay in the ED, he has had some \"mild discomfort\" but he feels much better. He has not noticed any yellowing of his skin or eye or color change of his stool or urine.     Past Medical History:  GERD (gastroesophageal reflux disease), HBV (hepatitis B virus) infection, and Hyperlipidemia.    Past Surgical History:  No past surgical history on file.    Social History:  Social History     Tobacco Use     Smoking status: Never Smoker     Smokeless tobacco: Never Used   Substance Use Topics     Alcohol use: Not Currently     Drug use: Never        Family History:  Family History   Problem Relation Age of Onset     Glaucoma Father      Diabetes Type 2  Mother      Hepatitis Sister      Hypertension Brother      Macular Degeneration No family hx of      No FH bleeding or clotting problems or reactions to anesthesia.    Review of Systems:  The 10 point review of systems is negative other than noted in the HPI and above.    Physical Exam:  Vitals: Ht 1.778 m (5' 10\")   Wt 74.4 kg (164 lb)   BMI 23.53 kg/m    BMI= Body mass index is 23.53 kg/m .   Exam is limited by video visit.  General - Well developed, well nourished male in no apparent distress  HEENT:  Pupils equal and round, " conjunctivae clear, no obvious scleral icterus  Pulmonary: Breathing is unlabored on room air  Neurologic: alert, speech is clear, nonfocal  Psychiatric: Mood and affect appropriate  Skin: Without obvious jaundice    Relevant labs:    WBC -   Lab Results   Component Value Date    WBC 5.9 02/16/2021       HgB -   Lab Results   Component Value Date    HGB 12.8 (L) 02/16/2021       Plt-   Lab Results   Component Value Date     02/16/2021       Liver Function Studies -   Recent Labs   Lab Test 02/16/21  0154   PROTTOTAL 7.7   ALBUMIN 3.9   BILITOTAL 0.8   ALKPHOS 78   *   *       Lipase-   Lab Results   Component Value Date    LIPASE 9,401 (H) 02/16/2021         Imaging:  All imaging studies reviewed by me.    Ultrasound shows: positive cholelithiasis, negative gallbladder wall thickening, positive ductal dilatation, negative pericholecystic fluid, negative sonographic Rincon's sign.    Recent Results (from the past 744 hour(s))   Abdomen US, limited (RUQ only)    Narrative    EXAM: ULTRASOUND ABDOMEN LIMITED - RIGHT UPPER QUADRANT  LOCATION: [facility]  DATE/TIME: 2/16/2021 3:06 AM    INDICATION: Elevated lipase. Known gallstones. Elevated liver function tests. Right upper quadrant pain. Epigastric pain.  COMPARISON: 12/22/2020.    TECHNIQUE: Limited abdominal ultrasound.    FINDINGS:    GALLBLADDER: A few gallstones are present in the borderline distended gallbladder. No convincing gallbladder wall thickening or pericholecystic fluid. No focal tenderness over the gallbladder.    BILE DUCTS: Moderate dilatation of the common duct, which measures up to 1.3 cm in diameter. No convincing intrahepatic biliary dilatation.    LIVER: Normal echogenicity. No visualized masses.    RIGHT KIDNEY: 11.3 cm in length length. Nonspecific mildly heterogeneous echotexture. No intrarenal collecting system dilatation or calculi. 2.4 x 2.2 x 2.2 cm simple-appearing cyst in the upper pole of the kidney.  No follow-up is necessary.    OTHER: No free fluid in the upper right hemiabdomen.      Impression    IMPRESSION:   1. Moderate dilatation of the distal common duct, which measures 1.3 cm in diameter. This appears larger in caliber than on the recent comparison study dated 12/22/2020 when it measured 0.9 cm in diameter. No convincing intrahepatic biliary dilatation.  2. Cholelithiasis. No convincing evidence of acute cholecystitis.          This note was created using voice recognition software. Undetected word substitutions or other errors may have occurred.     Time spent with the patient with greater that 50% of the time in discussion was 40 minutes.     Loulou Camara MD  [facility], [place]    Please route or send letter to:  Primary Care Provider (PCP)

## 2021-02-18 NOTE — LETTER
2021    RE: Sadie Olea, : 1955      Sadie Olea is a 52 year old who is being evaluated via a billable video visit.       [facility]  New Patient Virtual Visit      Sadie Olea is a 65 year old male seen in consultation for Abdominal pain, epigastric at the request of [facility].       Assessment and Plan:  It is my impression that Sadie has a very recent history of gallstone pancreatitis and has possibly passed a common duct stone or sludge. I have ordered repeat lipase and LFTs and asked him to have these drawn today. Thankfully he is no longer having any symptoms. If labs are still concerning for a common bile duct stone, I will instruct him to return to the ED for admission and ERCP.      If his labs have normalized, I will offer offered him a laparoscopic cholecystectomy with intraoperative cholangiogram.       We have discussed the indication, alternatives, risks and expected recovery.  Specifically we have discussed incisions, scarring, postoperative infections, anesthesia, bleeding, blood transfusion, open conversion, common bile duct injury, injury to intra-abdominal organs, adhesions that can lead to bowel obstruction, retained common bile duct stone, bile leak, DVT, PE, hernia, post cholecystectomy diarrhea, postoperative dietary restrictions and physical limitations.  We have discussed the recommended interventions and treatments for these complications.  All questions have been answered to the best of my ability.          We will schedule surgery when the workup is complete.  Needs a preop H&P to be performed by PCP if he does not require admission for ongoing choledocholithiasis.      Chief complaint:  Abdominal pain, epigastric     HPI:  Sadie Olea is a 65 year old male who presents with intermittent epigastric pain for 3 days.  The pain is not associated with eating any type of food.  Positive for associated symptoms of annorexia.  Negative for  "associated symptoms of nausea, vomiting, diarrhea, constipation, fever and chills.  He does not have a history of jaundice or dark urine.  He has not had pancreatitis in the past. He has a history of hepatitis B and has been undergoing treatment.      He presented to the ED 2 nights ago with sudden onset of epigastric pain. The pain waxed and wanes throughout the night and he went to the ED when it because very severe. Workup revealed elevated lipase to 9K, and a dilated common bile duct to 13 mm. His RUQ showed gallstones, as it has for several years. He elected to go home from the ED despite recommendations that he be admitted for possible ERCP and lap osvaldo. Since his stay in the ED, he has had some \"mild discomfort\" but he feels much better. He has not noticed any yellowing of his skin or eye or color change of his stool or urine.      Past Medical History:  GERD (gastroesophageal reflux disease), HBV (hepatitis B virus) infection, and Hyperlipidemia.     No FH bleeding or clotting problems or reactions to anesthesia.     Review of Systems:  The 10 point review of systems is negative other than noted in the HPI and above.     Physical Exam:  Vitals: Ht 1.778 m (5' 10\")   Wt 74.4 kg (164 lb)   BMI 23.53 kg/m    BMI= Body mass index is 23.53 kg/m .   Exam is limited by video visit.  General - Well developed, well nourished male in no apparent distress  HEENT:  Pupils equal and round, conjunctivae clear, no obvious scleral icterus  Pulmonary: Breathing is unlabored on room air  Neurologic: alert, speech is clear, nonfocal  Psychiatric: Mood and affect appropriate  Skin: Without obvious jaundice     Relevant labs:     WBC -         Lab Results   Component Value Date     WBC 5.9 02/16/2021         HgB -         Lab Results   Component Value Date     HGB 12.8 (L) 02/16/2021         Plt-         Lab Results   Component Value Date      02/16/2021         Liver Function Studies -       Recent Labs   Lab Test " 02/16/21  0154   PROTTOTAL 7.7   ALBUMIN 3.9   BILITOTAL 0.8   ALKPHOS 78   *   *         Lipase-         Lab Results   Component Value Date     LIPASE 9,401 (H) 02/16/2021         Imaging:  All imaging studies reviewed by me.     Ultrasound shows: positive cholelithiasis, negative gallbladder wall thickening, positive ductal dilatation, negative pericholecystic fluid, negative sonographic Rincon's sign.         Recent Results (from the past 744 hour(s))   Abdomen US, limited (RUQ only)     Narrative     EXAM: ULTRASOUND ABDOMEN LIMITED - RIGHT UPPER QUADRANT  LOCATION: [facility]  DATE/TIME: 2/16/2021 3:06 AM     INDICATION: Elevated lipase. Known gallstones. Elevated liver function tests. Right upper quadrant pain. Epigastric pain.  COMPARISON: 12/22/2020.     TECHNIQUE: Limited abdominal ultrasound.     FINDINGS:     GALLBLADDER: A few gallstones are present in the borderline distended gallbladder. No convincing gallbladder wall thickening or pericholecystic fluid. No focal tenderness over the gallbladder.     BILE DUCTS: Moderate dilatation of the common duct, which measures up to 1.3 cm in diameter. No convincing intrahepatic biliary dilatation.     LIVER: Normal echogenicity. No visualized masses.     RIGHT KIDNEY: 11.3 cm in length length. Nonspecific mildly heterogeneous echotexture. No intrarenal collecting system dilatation or calculi. 2.4 x 2.2 x 2.2 cm simple-appearing cyst in the upper pole of the kidney. No follow-up is necessary.     OTHER: No free fluid in the upper right hemiabdomen.        Impression     IMPRESSION:   1. Moderate dilatation of the distal common duct, which measures 1.3 cm in diameter. This appears larger in caliber than on the recent comparison study dated 12/22/2020 when it measured 0.9 cm in diameter. No convincing intrahepatic biliary dilatation.  2. Cholelithiasis. No convincing evidence of acute cholecystitis.           Loulou Camara MD  [facility] [place]

## 2021-05-06 DIAGNOSIS — B18.1 CHRONIC VIRAL HEPATITIS B WITHOUT DELTA AGENT AND WITHOUT COMA (H): Primary | ICD-10-CM

## 2021-05-20 ENCOUNTER — TELEPHONE (OUTPATIENT)
Dept: SURGERY | Facility: CLINIC | Age: 66
End: 2021-05-20

## 2021-05-20 ENCOUNTER — PREP FOR PROCEDURE (OUTPATIENT)
Dept: SURGERY | Facility: CLINIC | Age: 66
End: 2021-05-20

## 2021-05-20 DIAGNOSIS — K85.10 GALLSTONE PANCREATITIS: Primary | ICD-10-CM

## 2021-05-20 NOTE — TELEPHONE ENCOUNTER
Type of surgery:  LAPAROSCOPIC CHOLECYSTECTOMY WITH CHOLANGIOGRAM  Location of surgery: [facility]  Date and time of surgery: 6/16/2021 @ 10:30 AM   Surgeon: Loulou Camara MD   Pre-Op Appt Date: PATIENT TO SCHEDULE    Post-Op Appt Date: PATIENT TO SCHEDULE     Packet sent out: Yes  Pre-cert/Authorization completed:  Not Applicable  Date: 5/20/2021      LAPAROSCOPIC CHOLECYSTECTOMY WITH CHOLANGIOGRAM    GENERAL PT INST TO HAVE H&P WIHT PCP 90  MIN REQ PA ASSIST MGB NMS

## 2021-05-25 DIAGNOSIS — Z11.59 ENCOUNTER FOR SCREENING FOR OTHER VIRAL DISEASES: ICD-10-CM

## 2021-05-27 ENCOUNTER — RECORDS - HEALTHEAST (OUTPATIENT)
Dept: ADMINISTRATIVE | Facility: CLINIC | Age: 66
End: 2021-05-27

## 2021-05-29 ENCOUNTER — RECORDS - HEALTHEAST (OUTPATIENT)
Dept: ADMINISTRATIVE | Facility: CLINIC | Age: 66
End: 2021-05-29

## 2021-05-30 ENCOUNTER — RECORDS - HEALTHEAST (OUTPATIENT)
Dept: ADMINISTRATIVE | Facility: CLINIC | Age: 66
End: 2021-05-30

## 2021-06-02 ENCOUNTER — RECORDS - HEALTHEAST (OUTPATIENT)
Dept: ADMINISTRATIVE | Facility: CLINIC | Age: 66
End: 2021-06-02

## 2021-06-08 ENCOUNTER — HOSPITAL ENCOUNTER (OUTPATIENT)
Dept: ULTRASOUND IMAGING | Facility: CLINIC | Age: 66
Discharge: HOME | End: 2021-06-08
Attending: PHYSICIAN ASSISTANT | Admitting: PHYSICIAN ASSISTANT
Payer: COMMERCIAL

## 2021-06-08 ENCOUNTER — HOSPITAL ENCOUNTER (OUTPATIENT)
Dept: LAB | Facility: CLINIC | Age: 66
Discharge: HOME | End: 2021-06-08
Attending: SURGERY | Admitting: PHYSICIAN ASSISTANT
Payer: COMMERCIAL

## 2021-06-08 DIAGNOSIS — B18.1 CHRONIC VIRAL HEPATITIS B WITHOUT DELTA AGENT AND WITHOUT COMA (H): ICD-10-CM

## 2021-06-08 LAB
ALBUMIN SERPL-MCNC: 3.7 G/DL (ref 3.4–5)
ALP SERPL-CCNC: 51 U/L (ref 40–150)
ALT SERPL W P-5'-P-CCNC: 25 U/L (ref 0–70)
ANION GAP SERPL CALCULATED.3IONS-SCNC: 4 MMOL/L (ref 3–14)
AST SERPL W P-5'-P-CCNC: 17 U/L (ref 0–45)
BILIRUB DIRECT SERPL-MCNC: 0.2 MG/DL (ref 0–0.2)
BILIRUB SERPL-MCNC: 0.8 MG/DL (ref 0.2–1.3)
BUN SERPL-MCNC: 18 MG/DL (ref 7–30)
CALCIUM SERPL-MCNC: 8.5 MG/DL (ref 8.5–10.1)
CHLORIDE SERPL-SCNC: 107 MMOL/L (ref 94–109)
CO2 SERPL-SCNC: 28 MMOL/L (ref 20–32)
CREAT SERPL-MCNC: 0.97 MG/DL (ref 0.66–1.25)
ERYTHROCYTE [DISTWIDTH] IN BLOOD BY AUTOMATED COUNT: 12.8 % (ref 10–15)
GFR SERPL CREATININE-BSD FRML MDRD: 83 ML/MIN/{1.73_M2}
GLUCOSE SERPL-MCNC: 98 MG/DL (ref 70–99)
HCT VFR BLD AUTO: 35.6 % (ref 40–53)
HGB BLD-MCNC: 12.3 G/DL (ref 13.3–17.7)
INR PPP: 1.15 (ref 0.86–1.14)
MCH RBC QN AUTO: 31.9 PG (ref 26.5–33)
MCHC RBC AUTO-ENTMCNC: 34.6 G/DL (ref 31.5–36.5)
MCV RBC AUTO: 93 FL (ref 78–100)
PLATELET # BLD AUTO: 142 10E9/L (ref 150–450)
POTASSIUM SERPL-SCNC: 3.5 MMOL/L (ref 3.4–5.3)
PROT SERPL-MCNC: 7.1 G/DL (ref 6.8–8.8)
RBC # BLD AUTO: 3.85 10E12/L (ref 4.4–5.9)
SODIUM SERPL-SCNC: 139 MMOL/L (ref 133–144)
WBC # BLD AUTO: 4.7 10E9/L (ref 4–11)

## 2021-06-08 PROCEDURE — 80048 BASIC METABOLIC PNL TOTAL CA: CPT | Performed by: PHYSICIAN ASSISTANT

## 2021-06-08 PROCEDURE — 87517 HEPATITIS B DNA QUANT: CPT | Performed by: PHYSICIAN ASSISTANT

## 2021-06-08 PROCEDURE — 80076 HEPATIC FUNCTION PANEL: CPT | Performed by: PHYSICIAN ASSISTANT

## 2021-06-08 PROCEDURE — 36415 COLL VENOUS BLD VENIPUNCTURE: CPT | Performed by: PHYSICIAN ASSISTANT

## 2021-06-08 PROCEDURE — 85610 PROTHROMBIN TIME: CPT | Performed by: PHYSICIAN ASSISTANT

## 2021-06-08 PROCEDURE — 85027 COMPLETE CBC AUTOMATED: CPT | Performed by: PHYSICIAN ASSISTANT

## 2021-06-08 PROCEDURE — 76700 US EXAM ABDOM COMPLETE: CPT

## 2021-06-10 ENCOUNTER — OFFICE VISIT (OUTPATIENT)
Dept: PEDIATRICS | Facility: CLINIC | Age: 66
End: 2021-06-10
Payer: COMMERCIAL

## 2021-06-10 VITALS
SYSTOLIC BLOOD PRESSURE: 118 MMHG | OXYGEN SATURATION: 99 % | WEIGHT: 155.4 LBS | RESPIRATION RATE: 20 BRPM | DIASTOLIC BLOOD PRESSURE: 70 MMHG | HEIGHT: 70 IN | BODY MASS INDEX: 22.25 KG/M2 | TEMPERATURE: 98.4 F | HEART RATE: 56 BPM

## 2021-06-10 DIAGNOSIS — Z01.818 PREOP GENERAL PHYSICAL EXAM: Primary | ICD-10-CM

## 2021-06-10 LAB
HBV DNA SERPL NAA+PROBE-ACNC: 95 [IU]/ML
HBV DNA SERPL NAA+PROBE-LOG IU: 2 {LOG_IU}/ML

## 2021-06-10 PROCEDURE — 99214 OFFICE O/P EST MOD 30 MIN: CPT | Mod: GC | Performed by: STUDENT IN AN ORGANIZED HEALTH CARE EDUCATION/TRAINING PROGRAM

## 2021-06-10 ASSESSMENT — PAIN SCALES - GENERAL: PAINLEVEL: NO PAIN (0)

## 2021-06-10 ASSESSMENT — MIFFLIN-ST. JEOR: SCORE: 1506.14

## 2021-06-10 NOTE — PROGRESS NOTES
[facility]  [address]  [address] [place] [address]  Phone: [phone]  Fax: [phone]  Primary Provider: Ty Hutson  Pre-op Performing Provider: CALEB GARCIA      PREOPERATIVE EVALUATION:  Today's date: 6/10/2021    Sadie Olea is a 65 year old male who presents for a preoperative evaluation.        He is active and can walk up and down multiple flights of stairs without any difficulty.  For exercise he walks several miles a day and participates in yoga.  No shortness of breath.      Does have some mild constant right shoulder pain for the last few months.  Has not tried any over-the-counter medications, hot/cold packs or creams.  Does intermittently have feelings of abdominal distention which are relieved after passing gas.      Medical history notable for hep B for which he is on tenofovir.  No history of cardiac disease, diabetes or hypertension.  No history of previous surgeries.  No pertinent family histories.  No allergies.  No consumption of alcohol, tobacco or drugs.  Lives at home with his wife.  Works at the [employer].    Surgical Information:  Surgery/Procedure: LAPAROSCOPIC CHOLECYSTECTOMY WITH CHOLANGIOGRAM  Surgery Location: [facility]  Surgeon: Loulou Camara MD  Surgery Date: 6/16/21  Time of Surgery: 10:40 AM  Where patient plans to recover: At home with family  Fax number for surgical facility: Note does not need to be faxed, will be available electronically in Epic.    Type of Anesthesia Anticipated: General    Assessment & Plan     The proposed surgical procedure is considered INTERMEDIATE risk.    Risks and Recommendations:  The patient has the following additional risks and recommendations for perioperative complications:   - No identified additional risk factors other than previously addressed    Medication Instructions:  Patient is to take all scheduled medications on the day of surgery    RECOMMENDATION:  APPROVAL GIVEN  to proceed with proposed procedure, without further diagnostic evaluation.    Review of external notes as documented above 956}    Subjective     HPI related to upcoming procedure: In February the patient experienced three 5-minute episodes of sharp right upper quadrant pain.  By the time he arrived to the ER the pain had resolved.  Right upper quadrant ultrasound was notable for cholelithiasis but no signs of cholecystitis.  The patient was discharged from the ER.  He has not had any episodes of pain since then.      Review of Systems  Constitutional, neuro, ENT, endocrine, pulmonary, cardiac, gastrointestinal, genitourinary, musculoskeletal, integument and psychiatric systems are negative, except as otherwise noted.    Patient Active Problem List    Diagnosis Date Noted     Gallstone pancreatitis 05/20/2021     Priority: Medium     Added automatically from request for surgery [id number]       Hepatitis B, chronic (H) 02/18/2021     Priority: Medium     Benign prostatic hyperplasia 10/09/2017     Priority: Medium     Overview:   Created by Conversion       Esophageal reflux 10/09/2017     Priority: Medium     Overview:   Created by Conversion       Urgency of urination 10/09/2017     Priority: Medium     Overview:   Created by Conversion       Inflammatory liver disease 10/09/2017     Priority: Medium     Overview:   Created by Conversion  [facility] Annotation: Dec 19 2011 11:05JOHN Rose Sr, [place] SHELL:   Patient report consistent with chronic active hepatitis B    Replacement Utility updated for latest IMO load       Hyperlipidemia 10/09/2017     Priority: Medium     Overview:   Created by Conversion       Osteoarthrosis 10/09/2017     Priority: Medium     Overview:   Created by Conversion    Replacement Utility updated for latest IMO load       Dermatochalasis of eyelids of both eyes 06/17/2016     Priority: Medium     Essential hypertension 04/06/2016     Priority: Medium     Nonallopathic lesion of lumbar  "region 04/30/2013     Priority: Medium     Overview:   Epic        Spasm of muscle 04/30/2013     Priority: Medium     Bilateral renal cysts 10/23/2012     Priority: Medium     Hepatitis B virus infection 11/29/2011     Priority: Medium      Past Medical History:   Diagnosis Date     GERD (gastroesophageal reflux disease)      HBV (hepatitis B virus) infection      Hyperlipidemia      History reviewed. No pertinent surgical history.  Current Outpatient Medications   Medication Sig Dispense Refill     tenofovir (VIREAD) 300 MG tablet Take 1 tablet (300 mg) by mouth daily 30 tablet 11       No Known Allergies     Social History     Tobacco Use     Smoking status: Never Smoker     Smokeless tobacco: Never Used   Substance Use Topics     Alcohol use: Not Currently     History   Drug Use Unknown         Objective     /70   Pulse 56   Temp 98.4  F (36.9  C) (Oral)   Resp 20   Ht 1.778 m (5' 10\")   Wt 70.5 kg (155 lb 6.4 oz)   SpO2 99%   BMI 22.30 kg/m      Physical Exam    GENERAL APPEARANCE: healthy, alert and no distress     EYES: EOMI,  PERRL     HENT: ear canals and TM's normal and nose and mouth without ulcers or lesions     NECK: no adenopathy, no asymmetry, masses, or scars and thyroid normal to palpation     RESP: lungs clear to auscultation - no rales, rhonchi or wheezes     CV: regular rates and rhythm, normal S1 S2, no S3 or S4 and no murmur, click or rub     ABDOMEN:  soft, nontender, no HSM or masses and bowel sounds normal     MS: extremities normal- no gross deformities noted, no evidence of inflammation in joints, FROM in all extremities.     SKIN: no suspicious lesions or rashes     NEURO: Normal strength and tone, sensory exam grossly normal, mentation intact and speech normal     PSYCH: mentation appears normal. and affect normal/bright     LYMPHATICS: No cervical adenopathy    Recent Labs   Lab Test 06/08/21  1650 02/16/21  0154 12/29/20  1515   HGB 12.3* 12.8* 12.9*   * 165 171 "   INR 1.15*  --  1.08    141 140   POTASSIUM 3.5 3.4 3.6   CR 0.97 0.90 0.87        Diagnostics:  Recent Results (from the past 168 hour(s))   INR    Collection Time: 06/08/21  4:50 PM   Result Value Ref Range    INR 1.15 (H) 0.86 - 1.14   Hepatic panel    Collection Time: 06/08/21  4:50 PM   Result Value Ref Range    Bilirubin Direct 0.2 0.0 - 0.2 mg/dL    Bilirubin Total 0.8 0.2 - 1.3 mg/dL    Albumin 3.7 3.4 - 5.0 g/dL    Protein Total 7.1 6.8 - 8.8 g/dL    Alkaline Phosphatase 51 40 - 150 U/L    ALT 25 0 - 70 U/L    AST 17 0 - 45 U/L   CBC with platelets    Collection Time: 06/08/21  4:50 PM   Result Value Ref Range    WBC 4.7 4.0 - 11.0 10e9/L    RBC Count 3.85 (L) 4.4 - 5.9 10e12/L    Hemoglobin 12.3 (L) 13.3 - 17.7 g/dL    Hematocrit 35.6 (L) 40.0 - 53.0 %    MCV 93 78 - 100 fl    MCH 31.9 26.5 - 33.0 pg    MCHC 34.6 31.5 - 36.5 g/dL    RDW 12.8 10.0 - 15.0 %    Platelet Count 142 (L) 150 - 450 10e9/L   Basic metabolic panel    Collection Time: 06/08/21  4:50 PM   Result Value Ref Range    Sodium 139 133 - 144 mmol/L    Potassium 3.5 3.4 - 5.3 mmol/L    Chloride 107 94 - 109 mmol/L    Carbon Dioxide 28 20 - 32 mmol/L    Anion Gap 4 3 - 14 mmol/L    Glucose 98 70 - 99 mg/dL    Urea Nitrogen 18 7 - 30 mg/dL    Creatinine 0.97 0.66 - 1.25 mg/dL    GFR Estimate 83 >60 mL/min/[1.73_m2]    GFR Estimate If Black >90 >60 mL/min/[1.73_m2]    Calcium 8.5 8.5 - 10.1 mg/dL      No EKG required, no history of coronary heart disease, significant arrhythmia, peripheral arterial disease or other structural heart disease.    Revised Cardiac Risk Index (RCRI):  The patient has the following serious cardiovascular risks for perioperative complications:   - High risk surgery (>5% cardiac complication risk) = 1 point     RCRI Interpretation: 1 point: Class II (low risk - 0.9% complication rate)        Signed Electronically by: Cyrus Miguel MD  Copy of this evaluation report is provided to requesting physician.    Patient  seen and examined with Dr. Miguel. I was present for the key portions of the visit including the history and physical exam.  His progress note reflects our joint assessment and plan.     Vinay Ennis MD  Internal Medicine-Pediatrics

## 2021-06-10 NOTE — PATIENT INSTRUCTIONS

## 2021-06-14 DIAGNOSIS — Z11.59 ENCOUNTER FOR SCREENING FOR OTHER VIRAL DISEASES: ICD-10-CM

## 2021-06-14 LAB
LABORATORY COMMENT REPORT: NORMAL
SARS-COV-2 RNA RESP QL NAA+PROBE: NEGATIVE
SARS-COV-2 RNA RESP QL NAA+PROBE: NORMAL
SPECIMEN SOURCE: NORMAL
SPECIMEN SOURCE: NORMAL

## 2021-06-14 PROCEDURE — U0003 INFECTIOUS AGENT DETECTION BY NUCLEIC ACID (DNA OR RNA); SEVERE ACUTE RESPIRATORY SYNDROME CORONAVIRUS 2 (SARS-COV-2) (CORONAVIRUS DISEASE [COVID-19]), AMPLIFIED PROBE TECHNIQUE, MAKING USE OF HIGH THROUGHPUT TECHNOLOGIES AS DESCRIBED BY CMS-2020-01-R: HCPCS | Performed by: SURGERY

## 2021-06-14 PROCEDURE — U0005 INFEC AGEN DETEC AMPLI PROBE: HCPCS | Performed by: SURGERY

## 2021-06-15 ENCOUNTER — TELEPHONE (OUTPATIENT)
Dept: PEDIATRICS | Facility: CLINIC | Age: 66
End: 2021-06-15

## 2021-06-15 NOTE — TELEPHONE ENCOUNTER
Ivy   [phone]    Needs this pre-op physical signed.     Surgery tomorrow morning 6/16/2021    Needs a call back when signed.     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

## 2021-06-16 ENCOUNTER — ANESTHESIA (OUTPATIENT)
Dept: SURGERY | Facility: CLINIC | Age: 66
End: 2021-06-16
Payer: COMMERCIAL

## 2021-06-16 ENCOUNTER — SURGERY (OUTPATIENT)
Age: 66
End: 2021-06-16
Payer: COMMERCIAL

## 2021-06-16 ENCOUNTER — HOSPITAL ENCOUNTER (OUTPATIENT)
Facility: CLINIC | Age: 66
Discharge: HOME | End: 2021-06-16
Attending: SURGERY | Admitting: SURGERY
Payer: COMMERCIAL

## 2021-06-16 ENCOUNTER — HOSPITAL ENCOUNTER (EMERGENCY)
Facility: CLINIC | Age: 66
Discharge: HOME | End: 2021-06-17
Attending: NURSE PRACTITIONER | Admitting: NURSE PRACTITIONER
Payer: COMMERCIAL

## 2021-06-16 ENCOUNTER — ANESTHESIA EVENT (OUTPATIENT)
Dept: SURGERY | Facility: CLINIC | Age: 66
End: 2021-06-16
Payer: COMMERCIAL

## 2021-06-16 ENCOUNTER — APPOINTMENT (OUTPATIENT)
Dept: SURGERY | Facility: PHYSICIAN GROUP | Age: 66
End: 2021-06-16
Payer: COMMERCIAL

## 2021-06-16 ENCOUNTER — APPOINTMENT (OUTPATIENT)
Dept: GENERAL RADIOLOGY | Facility: CLINIC | Age: 66
End: 2021-06-16
Attending: SURGERY
Payer: COMMERCIAL

## 2021-06-16 VITALS
SYSTOLIC BLOOD PRESSURE: 153 MMHG | DIASTOLIC BLOOD PRESSURE: 92 MMHG | HEART RATE: 62 BPM | TEMPERATURE: 97.7 F | RESPIRATION RATE: 16 BRPM | OXYGEN SATURATION: 100 %

## 2021-06-16 VITALS
RESPIRATION RATE: 14 BRPM | SYSTOLIC BLOOD PRESSURE: 152 MMHG | HEART RATE: 60 BPM | HEIGHT: 70 IN | OXYGEN SATURATION: 100 % | BODY MASS INDEX: 21.59 KG/M2 | WEIGHT: 150.8 LBS | TEMPERATURE: 97.9 F | DIASTOLIC BLOOD PRESSURE: 93 MMHG

## 2021-06-16 DIAGNOSIS — R33.9 URINARY RETENTION: ICD-10-CM

## 2021-06-16 DIAGNOSIS — T81.9XXA POST-OPERATIVE COMPLICATION: ICD-10-CM

## 2021-06-16 DIAGNOSIS — K85.10 GALLSTONE PANCREATITIS: ICD-10-CM

## 2021-06-16 PROCEDURE — 360N000083 HC SURGERY LEVEL 3 W/ FLUORO, PER MIN: Performed by: SURGERY

## 2021-06-16 PROCEDURE — 88304 TISSUE EXAM BY PATHOLOGIST: CPT | Mod: TC | Performed by: SURGERY

## 2021-06-16 PROCEDURE — 250N000009 HC RX 250: Performed by: NURSE ANESTHETIST, CERTIFIED REGISTERED

## 2021-06-16 PROCEDURE — 999N000179 XR SURGERY CARM FLUORO LESS THAN 5 MIN W STILLS

## 2021-06-16 PROCEDURE — 250N000011 HC RX IP 250 OP 636: Performed by: SURGERY

## 2021-06-16 PROCEDURE — 258N000003 HC RX IP 258 OP 636: Performed by: NURSE ANESTHETIST, CERTIFIED REGISTERED

## 2021-06-16 PROCEDURE — 710N000012 HC RECOVERY PHASE 2, PER MINUTE: Performed by: SURGERY

## 2021-06-16 PROCEDURE — 255N000002 HC RX 255 OP 636: Performed by: SURGERY

## 2021-06-16 PROCEDURE — 272N000001 HC OR GENERAL SUPPLY STERILE: Performed by: SURGERY

## 2021-06-16 PROCEDURE — 250N000009 HC RX 250: Performed by: SURGERY

## 2021-06-16 PROCEDURE — 51702 INSERT TEMP BLADDER CATH: CPT

## 2021-06-16 PROCEDURE — 47563 LAPARO CHOLECYSTECTOMY/GRAPH: CPT | Performed by: SURGERY

## 2021-06-16 PROCEDURE — 999N000141 HC STATISTIC PRE-PROCEDURE NURSING ASSESSMENT: Performed by: SURGERY

## 2021-06-16 PROCEDURE — 710N000009 HC RECOVERY PHASE 1, LEVEL 1, PER MIN: Performed by: SURGERY

## 2021-06-16 PROCEDURE — 88304 TISSUE EXAM BY PATHOLOGIST: CPT | Mod: 26

## 2021-06-16 PROCEDURE — 99283 EMERGENCY DEPT VISIT LOW MDM: CPT

## 2021-06-16 PROCEDURE — 258N000001 HC RX 258: Performed by: SURGERY

## 2021-06-16 PROCEDURE — 47563 LAPARO CHOLECYSTECTOMY/GRAPH: CPT | Mod: AS | Performed by: PHYSICIAN ASSISTANT

## 2021-06-16 PROCEDURE — 250N000011 HC RX IP 250 OP 636: Performed by: NURSE ANESTHETIST, CERTIFIED REGISTERED

## 2021-06-16 PROCEDURE — 370N000017 HC ANESTHESIA TECHNICAL FEE, PER MIN: Performed by: SURGERY

## 2021-06-16 PROCEDURE — 250N000011 HC RX IP 250 OP 636: Performed by: ANESTHESIOLOGY

## 2021-06-16 PROCEDURE — 250N000013 HC RX MED GY IP 250 OP 250 PS 637: Performed by: SURGERY

## 2021-06-16 RX ORDER — LIDOCAINE HYDROCHLORIDE 10 MG/ML
INJECTION, SOLUTION INFILTRATION; PERINEURAL PRN
Status: DISCONTINUED | OUTPATIENT
Start: 2021-06-16 | End: 2021-06-16

## 2021-06-16 RX ORDER — METOCLOPRAMIDE 10 MG/1
10 TABLET ORAL EVERY 6 HOURS PRN
Status: DISCONTINUED | OUTPATIENT
Start: 2021-06-16 | End: 2021-06-16 | Stop reason: HOSPADM

## 2021-06-16 RX ORDER — NALOXONE HYDROCHLORIDE 0.4 MG/ML
0.4 INJECTION, SOLUTION INTRAMUSCULAR; INTRAVENOUS; SUBCUTANEOUS
Status: DISCONTINUED | OUTPATIENT
Start: 2021-06-16 | End: 2021-06-16 | Stop reason: HOSPADM

## 2021-06-16 RX ORDER — SODIUM CHLORIDE, SODIUM LACTATE, POTASSIUM CHLORIDE, CALCIUM CHLORIDE 600; 310; 30; 20 MG/100ML; MG/100ML; MG/100ML; MG/100ML
INJECTION, SOLUTION INTRAVENOUS CONTINUOUS PRN
Status: DISCONTINUED | OUTPATIENT
Start: 2021-06-16 | End: 2021-06-16

## 2021-06-16 RX ORDER — DEXAMETHASONE SODIUM PHOSPHATE 4 MG/ML
INJECTION, SOLUTION INTRA-ARTICULAR; INTRALESIONAL; INTRAMUSCULAR; INTRAVENOUS; SOFT TISSUE PRN
Status: DISCONTINUED | OUTPATIENT
Start: 2021-06-16 | End: 2021-06-16

## 2021-06-16 RX ORDER — SODIUM CHLORIDE, SODIUM LACTATE, POTASSIUM CHLORIDE, CALCIUM CHLORIDE 600; 310; 30; 20 MG/100ML; MG/100ML; MG/100ML; MG/100ML
INJECTION, SOLUTION INTRAVENOUS CONTINUOUS
Status: DISCONTINUED | OUTPATIENT
Start: 2021-06-16 | End: 2021-06-16 | Stop reason: HOSPADM

## 2021-06-16 RX ORDER — HYDRALAZINE HYDROCHLORIDE 20 MG/ML
2.5-5 INJECTION INTRAMUSCULAR; INTRAVENOUS EVERY 10 MIN PRN
Status: DISCONTINUED | OUTPATIENT
Start: 2021-06-16 | End: 2021-06-16 | Stop reason: HOSPADM

## 2021-06-16 RX ORDER — ONDANSETRON 4 MG/1
4 TABLET, ORALLY DISINTEGRATING ORAL EVERY 30 MIN PRN
Status: DISCONTINUED | OUTPATIENT
Start: 2021-06-16 | End: 2021-06-16 | Stop reason: HOSPADM

## 2021-06-16 RX ORDER — NEOSTIGMINE METHYLSULFATE 1 MG/ML
VIAL (ML) INJECTION PRN
Status: DISCONTINUED | OUTPATIENT
Start: 2021-06-16 | End: 2021-06-16

## 2021-06-16 RX ORDER — IBUPROFEN 200 MG
600 TABLET ORAL EVERY 6 HOURS PRN
COMMUNITY
Start: 2021-06-16 | End: 2024-06-24

## 2021-06-16 RX ORDER — LIDOCAINE 40 MG/G
CREAM TOPICAL
Status: DISCONTINUED | OUTPATIENT
Start: 2021-06-16 | End: 2021-06-16 | Stop reason: HOSPADM

## 2021-06-16 RX ORDER — METOPROLOL TARTRATE 1 MG/ML
1-2 INJECTION, SOLUTION INTRAVENOUS EVERY 5 MIN PRN
Status: DISCONTINUED | OUTPATIENT
Start: 2021-06-16 | End: 2021-06-16 | Stop reason: HOSPADM

## 2021-06-16 RX ORDER — ACETAMINOPHEN 325 MG/1
650 TABLET ORAL
Status: DISCONTINUED | OUTPATIENT
Start: 2021-06-16 | End: 2021-06-16 | Stop reason: HOSPADM

## 2021-06-16 RX ORDER — ONDANSETRON 2 MG/ML
4 INJECTION INTRAMUSCULAR; INTRAVENOUS EVERY 30 MIN PRN
Status: DISCONTINUED | OUTPATIENT
Start: 2021-06-16 | End: 2021-06-16 | Stop reason: HOSPADM

## 2021-06-16 RX ORDER — OXYCODONE HYDROCHLORIDE 5 MG/1
10 TABLET ORAL
Status: COMPLETED | OUTPATIENT
Start: 2021-06-16 | End: 2021-06-16

## 2021-06-16 RX ORDER — FENTANYL CITRATE 50 UG/ML
INJECTION, SOLUTION INTRAMUSCULAR; INTRAVENOUS PRN
Status: DISCONTINUED | OUTPATIENT
Start: 2021-06-16 | End: 2021-06-16

## 2021-06-16 RX ORDER — GLYCOPYRROLATE 0.2 MG/ML
INJECTION, SOLUTION INTRAMUSCULAR; INTRAVENOUS PRN
Status: DISCONTINUED | OUTPATIENT
Start: 2021-06-16 | End: 2021-06-16

## 2021-06-16 RX ORDER — METOCLOPRAMIDE HYDROCHLORIDE 5 MG/ML
10 INJECTION INTRAMUSCULAR; INTRAVENOUS EVERY 6 HOURS PRN
Status: DISCONTINUED | OUTPATIENT
Start: 2021-06-16 | End: 2021-06-16 | Stop reason: HOSPADM

## 2021-06-16 RX ORDER — FENTANYL CITRATE 50 UG/ML
25-50 INJECTION, SOLUTION INTRAMUSCULAR; INTRAVENOUS
Status: DISCONTINUED | OUTPATIENT
Start: 2021-06-16 | End: 2021-06-16 | Stop reason: HOSPADM

## 2021-06-16 RX ORDER — KETOROLAC TROMETHAMINE 30 MG/ML
30 INJECTION, SOLUTION INTRAMUSCULAR; INTRAVENOUS EVERY 6 HOURS PRN
Status: DISCONTINUED | OUTPATIENT
Start: 2021-06-16 | End: 2021-06-16 | Stop reason: HOSPADM

## 2021-06-16 RX ORDER — CEFAZOLIN SODIUM 2 G/100ML
2 INJECTION, SOLUTION INTRAVENOUS SEE ADMIN INSTRUCTIONS
Status: DISCONTINUED | OUTPATIENT
Start: 2021-06-16 | End: 2021-06-16 | Stop reason: HOSPADM

## 2021-06-16 RX ORDER — BUPIVACAINE HYDROCHLORIDE 5 MG/ML
INJECTION, SOLUTION EPIDURAL; INTRACAUDAL PRN
Status: DISCONTINUED | OUTPATIENT
Start: 2021-06-16 | End: 2021-06-16 | Stop reason: HOSPADM

## 2021-06-16 RX ORDER — PROPOFOL 10 MG/ML
INJECTION, EMULSION INTRAVENOUS PRN
Status: DISCONTINUED | OUTPATIENT
Start: 2021-06-16 | End: 2021-06-16

## 2021-06-16 RX ORDER — MEPERIDINE HYDROCHLORIDE 25 MG/ML
12.5 INJECTION INTRAMUSCULAR; INTRAVENOUS; SUBCUTANEOUS
Status: DISCONTINUED | OUTPATIENT
Start: 2021-06-16 | End: 2021-06-16 | Stop reason: HOSPADM

## 2021-06-16 RX ORDER — ACETAMINOPHEN 500 MG
1000 TABLET ORAL EVERY 6 HOURS PRN
COMMUNITY
Start: 2021-06-16 | End: 2024-06-24

## 2021-06-16 RX ORDER — NALOXONE HYDROCHLORIDE 0.4 MG/ML
0.2 INJECTION, SOLUTION INTRAMUSCULAR; INTRAVENOUS; SUBCUTANEOUS
Status: DISCONTINUED | OUTPATIENT
Start: 2021-06-16 | End: 2021-06-16 | Stop reason: HOSPADM

## 2021-06-16 RX ORDER — OXYCODONE HYDROCHLORIDE 5 MG/1
5-10 TABLET ORAL EVERY 4 HOURS PRN
Qty: 12 TABLET | Refills: 0 | Status: SHIPPED | OUTPATIENT
Start: 2021-06-16 | End: 2021-07-05

## 2021-06-16 RX ORDER — CEFAZOLIN SODIUM 2 G/100ML
2 INJECTION, SOLUTION INTRAVENOUS
Status: COMPLETED | OUTPATIENT
Start: 2021-06-16 | End: 2021-06-16

## 2021-06-16 RX ORDER — ONDANSETRON 2 MG/ML
INJECTION INTRAMUSCULAR; INTRAVENOUS PRN
Status: DISCONTINUED | OUTPATIENT
Start: 2021-06-16 | End: 2021-06-16

## 2021-06-16 RX ORDER — DIMENHYDRINATE 50 MG/ML
25 INJECTION, SOLUTION INTRAMUSCULAR; INTRAVENOUS
Status: DISCONTINUED | OUTPATIENT
Start: 2021-06-16 | End: 2021-06-16 | Stop reason: HOSPADM

## 2021-06-16 RX ADMIN — FENTANYL CITRATE 100 MCG: 50 INJECTION, SOLUTION INTRAMUSCULAR; INTRAVENOUS at 10:54

## 2021-06-16 RX ADMIN — FENTANYL CITRATE 50 MCG: 50 INJECTION, SOLUTION INTRAMUSCULAR; INTRAVENOUS at 11:11

## 2021-06-16 RX ADMIN — KETOROLAC TROMETHAMINE 30 MG: 30 INJECTION, SOLUTION INTRAMUSCULAR at 13:04

## 2021-06-16 RX ADMIN — ROCURONIUM BROMIDE 40 MG: 10 INJECTION INTRAVENOUS at 10:54

## 2021-06-16 RX ADMIN — GLYCOPYRROLATE 0.6 MG: 0.2 INJECTION, SOLUTION INTRAMUSCULAR; INTRAVENOUS at 12:13

## 2021-06-16 RX ADMIN — FENTANYL CITRATE 50 MCG: 50 INJECTION, SOLUTION INTRAMUSCULAR; INTRAVENOUS at 12:51

## 2021-06-16 RX ADMIN — CEFAZOLIN SODIUM 2 G: 2 INJECTION, SOLUTION INTRAVENOUS at 10:45

## 2021-06-16 RX ADMIN — MIDAZOLAM 2 MG: 1 INJECTION INTRAMUSCULAR; INTRAVENOUS at 10:43

## 2021-06-16 RX ADMIN — FENTANYL CITRATE 50 MCG: 50 INJECTION, SOLUTION INTRAMUSCULAR; INTRAVENOUS at 11:57

## 2021-06-16 RX ADMIN — SODIUM CHLORIDE, POTASSIUM CHLORIDE, SODIUM LACTATE AND CALCIUM CHLORIDE: 600; 310; 30; 20 INJECTION, SOLUTION INTRAVENOUS at 10:41

## 2021-06-16 RX ADMIN — LIDOCAINE HYDROCHLORIDE 50 MG: 10 INJECTION, SOLUTION INFILTRATION; PERINEURAL at 10:54

## 2021-06-16 RX ADMIN — OXYCODONE HYDROCHLORIDE 10 MG: 5 TABLET ORAL at 13:28

## 2021-06-16 RX ADMIN — FENTANYL CITRATE 50 MCG: 50 INJECTION, SOLUTION INTRAMUSCULAR; INTRAVENOUS at 12:59

## 2021-06-16 RX ADMIN — NEOSTIGMINE METHYLSULFATE 3 MG: 1 INJECTION, SOLUTION INTRAVENOUS at 12:13

## 2021-06-16 RX ADMIN — PHENYLEPHRINE HYDROCHLORIDE 100 MCG: 10 INJECTION INTRAVENOUS at 11:21

## 2021-06-16 RX ADMIN — FENTANYL CITRATE 50 MCG: 50 INJECTION, SOLUTION INTRAMUSCULAR; INTRAVENOUS at 13:39

## 2021-06-16 RX ADMIN — GLYCOPYRROLATE 0.2 MG: 0.2 INJECTION, SOLUTION INTRAMUSCULAR; INTRAVENOUS at 10:54

## 2021-06-16 RX ADMIN — SODIUM CHLORIDE 2000 ML: 900 IRRIGANT IRRIGATION at 12:12

## 2021-06-16 RX ADMIN — DEXAMETHASONE SODIUM PHOSPHATE 4 MG: 4 INJECTION, SOLUTION INTRA-ARTICULAR; INTRALESIONAL; INTRAMUSCULAR; INTRAVENOUS; SOFT TISSUE at 10:54

## 2021-06-16 RX ADMIN — SODIUM CHLORIDE 50 ML GIVEN: 900 IRRIGANT IRRIGATION at 12:12

## 2021-06-16 RX ADMIN — ONDANSETRON HYDROCHLORIDE 4 MG: 2 INJECTION, SOLUTION INTRAVENOUS at 11:21

## 2021-06-16 RX ADMIN — BUPIVACAINE HYDROCHLORIDE 20 ML: 5 INJECTION, SOLUTION EPIDURAL; INTRACAUDAL at 12:13

## 2021-06-16 RX ADMIN — ACETAMINOPHEN 650 MG: 325 TABLET, FILM COATED ORAL at 13:11

## 2021-06-16 RX ADMIN — PROPOFOL 200 MG: 10 INJECTION, EMULSION INTRAVENOUS at 10:54

## 2021-06-16 ASSESSMENT — MIFFLIN-ST. JEOR: SCORE: 1485.27

## 2021-06-16 NOTE — ANESTHESIA PREPROCEDURE EVALUATION
Anesthesia Pre-Procedure Evaluation    Patient: Sadie Olea   MRN: [id number] : 1955        Preoperative Diagnosis: Gallstone pancreatitis [K85.10]   Procedure : Procedure(s):  LAPAROSCOPIC CHOLECYSTECTOMY WITH CHOLANGIOGRAM     Past Medical History:   Diagnosis Date     GERD (gastroesophageal reflux disease)      HBV (hepatitis B virus) infection      Hyperlipidemia       History reviewed. No pertinent surgical history.   No Known Allergies   Social History     Tobacco Use     Smoking status: Never Smoker     Smokeless tobacco: Never Used   Substance Use Topics     Alcohol use: Not Currently      Wt Readings from Last 1 Encounters:   21 68.4 kg (150 lb 12.8 oz)        Anesthesia Evaluation   Pt has had prior anesthetic. Type: General.        ROS/MED HX  ENT/Pulmonary:  - neg pulmonary ROS     Neurologic:  - neg neurologic ROS     Cardiovascular:     (+) hypertension-----    METS/Exercise Tolerance:     Hematologic: Comments: Lab Test        21                       1650          0154          1515          1531          WBC          4.7          5.9          4.8          4.0           HGB          12.3*        12.8*        12.9*        12.4*         MCV          93           95           92           91            PLT          142*         165          171          150           INR          1.15*         --          1.08         1.19*          Lab Test        21                       1650          0154          1515          NA           139          141          140           POTASSIUM    3.5          3.4          3.6           CHLORIDE     107          108          108           CO2          28           29           29            BUN          18           24           20            CR           0.97         0.90         0.87          ANIONGAP     4            4            3             OMAYRA          8.5          8.4*          8.5           GLC          98           110*         110*              (+) anemia,     Musculoskeletal:  - neg musculoskeletal ROS     GI/Hepatic:     (+) GERD, Asymptomatic on medication, cholecystitis/cholelithiasis, hepatitis type B, liver disease,     Renal/Genitourinary:     (+) renal disease, type: ARF,     Endo:  - neg endo ROS     Psychiatric/Substance Use:  - neg psychiatric ROS     Infectious Disease:  - neg infectious disease ROS     Malignancy:  - neg malignancy ROS     Other:  - neg other ROS          Physical Exam    Airway        Mallampati: II   TM distance: > 3 FB   Neck ROM: full   Mouth opening: > 3 cm    Respiratory Devices and Support         Dental  no notable dental history         Cardiovascular   cardiovascular exam normal          Pulmonary   pulmonary exam normal                OUTSIDE LABS:  CBC:   Lab Results   Component Value Date    WBC 4.7 06/08/2021    WBC 5.9 02/16/2021    HGB 12.3 (L) 06/08/2021    HGB 12.8 (L) 02/16/2021    HCT 35.6 (L) 06/08/2021    HCT 38.6 (L) 02/16/2021     (L) 06/08/2021     02/16/2021     BMP:   Lab Results   Component Value Date     06/08/2021     02/16/2021    POTASSIUM 3.5 06/08/2021    POTASSIUM 3.4 02/16/2021    CHLORIDE 107 06/08/2021    CHLORIDE 108 02/16/2021    CO2 28 06/08/2021    CO2 29 02/16/2021    BUN 18 06/08/2021    BUN 24 02/16/2021    CR 0.97 06/08/2021    CR 0.90 02/16/2021    GLC 98 06/08/2021     (H) 02/16/2021     COAGS:   Lab Results   Component Value Date    PTT 27 02/20/2018    INR 1.15 (H) 06/08/2021     POC: No results found for: BGM, HCG, HCGS  HEPATIC:   Lab Results   Component Value Date    ALBUMIN 3.7 06/08/2021    PROTTOTAL 7.1 06/08/2021    ALT 25 06/08/2021    AST 17 06/08/2021    ALKPHOS 51 06/08/2021    BILITOTAL 0.8 06/08/2021     OTHER:   Lab Results   Component Value Date    OMAYRA 8.5 06/08/2021    LIPASE 156 02/18/2021       Anesthesia Plan    ASA Status:  3      Anesthesia Type:  General.     - Airway: ETT   Induction: Intravenous, Propofol.   Maintenance: Balanced.        Consents    Anesthesia Plan(s) and associated risks, benefits, and realistic alternatives discussed. Questions answered and patient/representative(s) expressed understanding.     - Discussed with:  Patient      - Extended Intubation/Ventilatory Support Discussed: No.      - Patient is DNR/DNI Status: No    Use of blood products discussed: Yes.     - Discussed with: Patient.     - Consented: consented to blood products            Reason for refusal: other.     Postoperative Care    Pain management: IV analgesics.   PONV prophylaxis: Ondansetron (or other 5HT-3), Dexamethasone or Solumedrol     Comments:                Sebastian Dobbins MD

## 2021-06-16 NOTE — DISCHARGE INSTRUCTIONS
GENERAL ANESTHESIA OR SEDATION ADULT DISCHARGE INSTRUCTIONS   SPECIAL PRECAUTIONS FOR 24 HOURS AFTER SURGERY    IT IS NOT UNUSUAL TO FEEL LIGHT-HEADED OR FAINT, UP TO 24 HOURS AFTER SURGERY OR WHILE TAKING PAIN MEDICATION.  IF YOU HAVE THESE SYMPTOMS; SIT FOR A FEW MINUTES BEFORE STANDING AND HAVE SOMEONE ASSIST YOU WHEN YOU GET UP TO WALK OR USE THE BATHROOM.    YOU SHOULD REST AND RELAX FOR THE NEXT 24 HOURS AND YOU MUST MAKE ARRANGEMENTS TO HAVE SOMEONE STAY WITH YOU FOR AT LEAST 24 HOURS AFTER YOUR DISCHARGE.  AVOID HAZARDOUS AND STRENUOUS ACTIVITIES.  DO NOT MAKE IMPORTANT DECISIONS FOR 24 HOURS.    DO NOT DRIVE ANY VEHICLE OR OPERATE MECHANICAL EQUIPMENT FOR 24 HOURS FOLLOWING THE END OF YOUR SURGERY.  EVEN THOUGH YOU MAY FEEL NORMAL, YOUR REACTIONS MAY BE AFFECTED BY THE MEDICATION YOU HAVE RECEIVED.    DO NOT DRINK ALCOHOLIC BEVERAGES FOR 24 HOURS FOLLOWING YOUR SURGERY.    DRINK CLEAR LIQUIDS (APPLE JUICE, GINGER ALE, 7-UP, BROTH, ETC.).  PROGRESS TO YOUR REGULAR DIET AS YOU FEEL ABLE.    YOU MAY HAVE A DRY MOUTH, A SORE THROAT, MUSCLES ACHES OR TROUBLE SLEEPING.  THESE SHOULD GO AWAY AFTER 24 HOURS.    CALL YOUR DOCTOR FOR ANY OF THE FOLLOWING:  SIGNS OF INFECTION (FEVER, GROWING TENDERNESS AT THE SURGERY SITE, A LARGE AMOUNT OF DRAINAGE OR BLEEDING, SEVERE PAIN, FOUL-SMELLING DRAINAGE, REDNESS OR SWELLING.    IT HAS BEEN OVER 8 TO 10 HOURS SINCE SURGERY AND YOU ARE STILL NOT ABLE TO URINATE (PASS WATER).     You received Toradol, an IV form of ibuprofen (Motrin) at 1  pm.  Do not take any ibuprofen products until 7 pm.    Maximum acetaminophen (Tylenol) dose from all sources should not exceed 4 grams (4000 mg) per day.  You received 650 mg at 1:10 pm.  Next dose is available anytime after 7:10 pm.    HOME CARE FOLLOWING LAPAROSCOPIC CHOLECYSTECTOMY  BRIAN Morales, ARMANDO Dixon. JAMIE Vazquez &  HI Pacheco      IINCISIONAL CARE:  Replace the bandage over your incisions until all  drainage stops, or if more comfortable to have in place.  If present, leave the steri-strips (white paper tapes) in place for 14 days after surgery.  If Dermabond (a type of skin glue) is present, leave in place until it wears/flakes off.   BATHING:  Avoid baths for 1 week after surgery.  Showers are okay.  You may wash your hair at any time.  Gently pat your incisions dry after bathing.  ACTIVITY:  Light Activity -- you may immediately be up and about as tolerated.  Driving -- you may drive when comfortable and off narcotic pain medications.  Light Work -- resume when comfortable off pain medications.  (If you can drive, you probably can work.)  Strenuous Work/Activity -- limit lifting to 20 pounds for 2 weeks.  Progressively increase with time.  Active Sports (running, biking, etc.) -- cautiously resume after 2 weeks.  DISCOMFORT:  Use pain medications as prescribed by your surgeon.  Take the pain medication with some food, when possible, to minimize side effects.  Intermittent use of ice packs at the incision sites may help during the first 48 hours.  Expect gradual improvement.  You may experience shoulder pain, which is due to the air placed within your abdomen during the procedure.  This is temporary and usually passes within 2 days.  DIET:  Drink plenty of fluids.  While taking pain medications, increase dietary fiber or add a fiber supplementation like Metamucil or Citrucel to help prevent constipation - a possible side effect of pain medications.  If taking Metamucil or Citrucel, take with plenty of fluids as instructed.  It is not uncommon to experience some bowel changes (loose stools or constipation) after surgery.  Your body has to adapt to you no longer having a gall bladder.  To help minimize this side effect, avoid fatty foods for the first week after surgery.  You may then slowly increase the amount of fatty foods in your diet.    NAUSEA:  If nauseated from the anesthetic/pain meds; rest in bed, get  up cautiously with assistance, and drink clear liquids (juice, tea, broth).  FOLLOW-UP AFTER SURGERY:  Our office will contact you approximately 2-3 weeks after surgery to check on your progress and answer any questions you may have.  If you are doing well, you will not need to return for an office appointment.  If any concerns are identified over the phone, we will help you make an appointment to see a provider.  If you have not received a phone call, have any questions or concerns, or would like to be seen, please call us at [phone].  We are located at [address], [facility] [address], [place] [address].    CONTACT US IF THE FOLLOWING DEVELOPS:  1.  A fever that is above 101   2.  If there is a large amount of drainage, bleeding, or swelling.  3.  Severe pain that is not relieved by your prescription.  4.  Drainage that is thick, cloudy, yellow, green or white.                                                             5.  Any other questions not answered by  Frequently Asked Questions  sheet.

## 2021-06-16 NOTE — OP NOTE
General Surgery Operative Note    PREOPERATIVE DIAGNOSIS:  Gallstone pancreatitis [K85.10]    POSTOPERATIVE DIAGNOSIS:  Same    PROCEDURE:  Laparoscopic Cholecystectomy with Intraoperative Cholangiogram    ANESTHESIA:  General.    PREOPERATIVE MEDICATIONS:  Ancef IV.    SURGEON:  Loulou Camara MD    ASSISTANT:  Dmitry Moreno PA-C    ESTIMATED BLOOD LOSS:  150 ml    INDICATIONS:  Sadie Olea is a 65 year old male who has been experiencing episodes of epigastric and RUQ abdominal pain for the past 4 months associated with nausea.  Abdominal imaging has revealed gallstones and he had an episode of pancreatitis in February 2020.  He now presents for laparoscopic cholecystectomy after having risks and benefits reviewed in detail.    PROCEDURE:   An infraumbilical incision was made and the abdomen was entered using a Anthony trocar technique.  Secondary trocars were placed under laparoscopic guidance.  We proceeded in the usual fashion first finding the gallbladder neck cystic duct junction.  Omentum was adherent to the gallbladder, however we are able to carefully free this with electrocautery. The cystic duct was then identified and cleared of inflammatory adhesions. The cystic artery was similarly identified.  Once a critical window of safety was achieved, the Rodarte clamp was positioned across the infundibulum for cholangiogram.  Cholangiogram showed normal filling of the duodenum with no filling defects within the common bile duct.  The common bile duct was notably large. The cholangiogram catheter was removed and the cystic duct was triply clipped and divided.  Cystic artery was also triply clipped and divided. The gallbladder was removed from the gallbladder bed using coag cautery.  Once the remaining attachments were divided, the gallbladder was extracted from the infraumbilical site in an EndoCatch bag. A superficial bleeding vessel on the liver bed was controlled with electrocautery and clips. Surgicel was  placed in the liver bed. The camera was repositioned and the right upper quadrant inspected.  The right upper quadrant was copiously irrigated with the saline solution.  Returns were clear.  Trocar sites were then infiltrated with 0.5% Marcaine plain. The infraumbilical fascial opening was closed with interrupted 0 Vicryl. The skin was closed using 4-0 subcuticular vicryl. Steri-Strips were placed on the incisions. The patient was transferred to recovery in good condition.        INTRAOPERATIVE FINDINGS: cholangiogram with normal filling of the duodenum    Specimens:   ID Type Source Tests Collected by Time Destination   A : Gallbladder and Contents Tissue Gallbladder and Contents SURGICAL PATHOLOGY EXAM Loulou Camara MD 6/16/2021 12:01 PM        Loulou Camara MD

## 2021-06-16 NOTE — ANESTHESIA CARE TRANSFER NOTE
Patient: Sadie Olea    Procedure(s):  LAPAROSCOPIC CHOLECYSTECTOMY WITH CHOLANGIOGRAM    Diagnosis: Gallstone pancreatitis [K85.10]  Diagnosis Additional Information: No value filed.    Anesthesia Type:   General     Note:    Oropharynx: oropharynx clear of all foreign objects and spontaneously breathing  Level of Consciousness: drowsy  Oxygen Supplementation: face mask    Independent Airway: airway patency satisfactory and stable  Dentition: dentition unchanged  Vital Signs Stable: post-procedure vital signs reviewed and stable  Report to RN Given: handoff report given  Patient transferred to: PACU    Handoff Report: Identifed the Patient, Identified the Reponsible Provider, Reviewed the pertinent medical history, Discussed the surgical course, Reviewed Intra-OP anesthesia mangement and issues during anesthesia, Set expectations for post-procedure period and Allowed opportunity for questions and acknowledgement of understanding      Vitals: (Last set prior to Anesthesia Care Transfer)  CRNA VITALS  6/16/2021 1200 - 6/16/2021 1235      6/16/2021             SpO2:  100 %        Electronically Signed By: SEAN Henley CRNA  June 16, 2021  12:35 PM

## 2021-06-16 NOTE — ANESTHESIA POSTPROCEDURE EVALUATION
Patient: Sadie Olea    Procedure(s):  LAPAROSCOPIC CHOLECYSTECTOMY WITH CHOLANGIOGRAM    Diagnosis:Gallstone pancreatitis [K85.10]  Diagnosis Additional Information: No value filed.    Anesthesia Type:  General    Note:     Postop Pain Control: Uneventful            Sign Out: Well controlled pain   PONV: No   Neuro/Psych: Uneventful            Sign Out: Acceptable/Baseline neuro status   Airway/Respiratory: Uneventful            Sign Out: Acceptable/Baseline resp. status   CV/Hemodynamics: Uneventful            Sign Out: Acceptable CV status; No obvious hypovolemia; No obvious fluid overload   Other NRE: NONE   DID A NON-ROUTINE EVENT OCCUR? No           Last vitals:  Vitals:    06/16/21 1415 06/16/21 1449 06/16/21 1616   BP: 129/88 139/86 (!) 140/86   Pulse:  66 60   Resp: 10 16 14   Temp:  97.8  F (36.6  C) 97.2  F (36.2  C)   SpO2: 96% 96% 98%       Last vitals prior to Anesthesia Care Transfer:  CRNA VITALS  6/16/2021 1200 - 6/16/2021 1300      6/16/2021             SpO2:  100 %          Electronically Signed By: Sebastian Dobbins MD  June 16, 2021  5:05 PM

## 2021-06-16 NOTE — ANESTHESIA PROCEDURE NOTES
Airway       Patient location during procedure: OR  Staff -        CRNA: Hillary Villeda APRN CRNA       Performed By: CRNAIndications and Patient Condition       Indications for airway management: bishnu-procedural       Induction type:intravenous       Mask difficulty assessment: 1 - vent by mask    Final Airway Details       Final airway type: endotracheal airway       Successful airway: ETT - single and Oral  Endotracheal Airway Details        ETT size (mm): 8.0       Successful intubation technique: direct laryngoscopy       DL Blade Type: Bush 2       Grade View of Cords: 1       Adjucts: stylet       Position: Right       Measured from: lips       Secured at (cm): 24       Bite block used: Soft    Post intubation assessment        Placement verified by: capnometry, equal breath sounds and chest rise        Number of attempts at approach: 1       Secured with: plastic tape       Ease of procedure: easy       Dentition: Unchanged and Intact    Medication(s) Administered   Medication Administration Time: 6/16/2021 10:53 AM

## 2021-06-17 LAB — COPATH REPORT: NORMAL

## 2021-06-17 ASSESSMENT — ENCOUNTER SYMPTOMS
FEVER: 0
CHILLS: 0
SHORTNESS OF BREATH: 0
NAUSEA: 0
VOMITING: 0
ABDOMINAL PAIN: 1
DIFFICULTY URINATING: 1

## 2021-06-17 NOTE — ED TRIAGE NOTES
Pt had lap osvaldo done today and has not voided since. Pt was discharged at 18:30 pm. Pt has tried drinking water and going for walks to get things moving, but has been unsuccessful. Pt reports pelvic pressure. ABC intact. A/O x4.

## 2021-06-17 NOTE — ED PROVIDER NOTES
History   Chief Complaint:  Urinary Retention       The history is provided by the patient and a relative.      Sadie Olea is a 65 year old male with history of a laparoscopic cholecystectomy who presents with urinary retention. He had a laparoscopic cholecystectomy done and was discharged from [facility] around 1830 today and he has been unable to void his bladder since. He has tried drinking water and going for walks to help but has been unsuccessful. He is worried about getting a catheter because of risk of infection.    Review of Systems   Constitutional: Negative for chills and fever.   Respiratory: Negative for shortness of breath.    Cardiovascular: Negative for chest pain.   Gastrointestinal: Positive for abdominal pain. Negative for nausea and vomiting.   Genitourinary: Positive for difficulty urinating.   All other systems reviewed and are negative.      Allergies:  The patient has no known allergies.     Medications:  Roxicodone  Viread    Past Medical History:    GERD  HBV  Hyperlipidemia  Gallstone pancreatitis  Hepatitis B  Benign prostatic hyperplasia  Inflammatory liver disease  Osteoarthrosis  Hypertension   Pneumonia  Dermatochalasis of eyelids  Bilateral renal cysts    Past Surgical History:    Oral surgery    Family History:    Glaucoma  Type II diabetes  Hepatitis  Hypertension  Hyperlipidemia  Cataract    Social History:  Patient is accompanied in the ED.  Patient had a recent operation done.    Physical Exam     Patient Vitals for the past 24 hrs:   BP Temp Temp src Pulse Resp SpO2   06/16/21 2329 (!) 153/92 97.7  F (36.5  C) Temporal 62 16 100 %       Physical Exam  General: Alert, Mild  discomfort, well kept  Eyes: PERRL, conjunctivae pink no scleral icterus or conjunctival injection  ENT:   Moist mucus membranes, posterior oropharynx clear without erythema or exudates, No lymphadenopathy, Normal voice  Resp:  Lungs clear to auscultation bilaterally, no crackles/rubs/wheezes. Good  air movement  CV:  Normal rate and rhythm, no murmurs/rubs/gallops  GI:  Abdomen soft, suprapubic distention.  Normoactive BS.  Mild tenderness,  No guarding or rebound, No masses  Skin:  Warm, dry.  No rashes or petechiae  Musculoskeletal: No peripheral edema or calf tenderness, Normal gross ROM   Neuro: Alert and oriented to person/place/time, normal sensation  Psychiatric: Normal affect, cooperative, good eye contact    Emergency Department Course     Emergency Department Course:    Reviewed:  I reviewed nursing notes, vitals, past medical history and care everywhere    Assessments:  0002 I obtained history and examined the patient as noted above.   0044 I rechecked the patient and explained findings.     Disposition:  The patient was discharged to home.       Impression & Plan     Medical Decision Making:  Sadie Olea is a 65 year old male who presents today for evaluation of urinary retention.  He had a laparoscopic cholecystectomy today.  Since that time he has been unable to urinate before presents for evaluation in the emergency department.  After multiple attempts to urinate both at home and in the emergency department he was unable and was noted to have approximately 700 cc of urine in his bladder.  Rainey catheter was placed and bladder was decompressed.  Patient felt significantly improved after 750 cc of urine was drained.  He was fitted with a leg bag as well as a night bag.  His daughter is the nurse and is able to help him care for his Rainey catheter.  We discussed leaving in place for 24 to 48 hours with 48 hours being preferential.  Patient will either follow-up with primary care on Friday for removal of catheter or his daughter will remove.  They understand that if he is not able to pee in 6 to 8 hours after removal of catheter he will likely need another one placed.  There is no indication for further testing or imaging.  He did not have any pain or other signs of complications from his surgery.   He is discharged home.    Covid-19  Sadie Olea was evaluated during a global COVID-19 pandemic, which necessitated consideration that the patient might be at risk for infection with the SARS-CoV-2 virus that causes COVID-19.   Applicable protocols for evaluation were followed during the patient's care.   COVID-19 was considered as part of the patient's evaluation. The plan for testing is:  a test was obtained at a previous visit and reviewed & considered today.    Diagnosis:    ICD-10-CM    1. Urinary retention  R33.9    2. Post-operative complication  T81.9XXA        Discharge Medications:  New Prescriptions    No medications on file       Scribe Disclosure:  Milton MOISE, am serving as a scribe at 11:39 PM on 6/16/2021 to document services personally performed by Gamaliel Islas APRN CNP based on my observations and the provider's statements to me.            Gamaliel Islas APRN CNP  06/17/21 0054

## 2021-06-30 ENCOUNTER — TELEPHONE (OUTPATIENT)
Dept: SURGERY | Facility: CLINIC | Age: 66
End: 2021-06-30

## 2021-06-30 NOTE — TELEPHONE ENCOUNTER
Attempted to call patient for post op check. No answer.  A message was left for patient to call back if they had any questions or concerns.     Dmitry Moreno PA-C

## 2021-07-05 ENCOUNTER — OFFICE VISIT (OUTPATIENT)
Dept: PEDIATRICS | Facility: CLINIC | Age: 66
End: 2021-07-05
Payer: COMMERCIAL

## 2021-07-05 ENCOUNTER — ANCILLARY PROCEDURE (OUTPATIENT)
Dept: GENERAL RADIOLOGY | Facility: CLINIC | Age: 66
End: 2021-07-05
Attending: INTERNAL MEDICINE
Payer: COMMERCIAL

## 2021-07-05 VITALS
DIASTOLIC BLOOD PRESSURE: 78 MMHG | SYSTOLIC BLOOD PRESSURE: 106 MMHG | OXYGEN SATURATION: 99 % | TEMPERATURE: 97.3 F | RESPIRATION RATE: 18 BRPM | HEART RATE: 57 BPM | HEIGHT: 70 IN | WEIGHT: 154.5 LBS | BODY MASS INDEX: 22.12 KG/M2

## 2021-07-05 DIAGNOSIS — D64.9 ANEMIA, UNSPECIFIED TYPE: ICD-10-CM

## 2021-07-05 DIAGNOSIS — Z00.00 ROUTINE GENERAL MEDICAL EXAMINATION AT A HEALTH CARE FACILITY: Primary | ICD-10-CM

## 2021-07-05 DIAGNOSIS — M25.511 CHRONIC RIGHT SHOULDER PAIN: ICD-10-CM

## 2021-07-05 DIAGNOSIS — G89.29 CHRONIC RIGHT SHOULDER PAIN: ICD-10-CM

## 2021-07-05 DIAGNOSIS — Z12.5 SCREENING FOR PROSTATE CANCER: ICD-10-CM

## 2021-07-05 PROCEDURE — 82728 ASSAY OF FERRITIN: CPT | Performed by: INTERNAL MEDICINE

## 2021-07-05 PROCEDURE — 36415 COLL VENOUS BLD VENIPUNCTURE: CPT | Performed by: INTERNAL MEDICINE

## 2021-07-05 PROCEDURE — 80061 LIPID PANEL: CPT | Performed by: INTERNAL MEDICINE

## 2021-07-05 PROCEDURE — 73030 X-RAY EXAM OF SHOULDER: CPT | Mod: RT | Performed by: RADIOLOGY

## 2021-07-05 PROCEDURE — 83550 IRON BINDING TEST: CPT | Performed by: INTERNAL MEDICINE

## 2021-07-05 PROCEDURE — 99396 PREV VISIT EST AGE 40-64: CPT | Performed by: INTERNAL MEDICINE

## 2021-07-05 PROCEDURE — G0103 PSA SCREENING: HCPCS | Performed by: INTERNAL MEDICINE

## 2021-07-05 PROCEDURE — 99213 OFFICE O/P EST LOW 20 MIN: CPT | Mod: 25 | Performed by: INTERNAL MEDICINE

## 2021-07-05 PROCEDURE — 83540 ASSAY OF IRON: CPT | Performed by: INTERNAL MEDICINE

## 2021-07-05 RX ORDER — VITAMIN B COMPLEX
TABLET ORAL DAILY
COMMUNITY
End: 2024-06-24

## 2021-07-05 ASSESSMENT — ENCOUNTER SYMPTOMS
FREQUENCY: 1
HEARTBURN: 1

## 2021-07-05 ASSESSMENT — MIFFLIN-ST. JEOR: SCORE: 1502.06

## 2021-07-05 NOTE — PROGRESS NOTES
SUBJECTIVE:   CC: Sadie Olea is an 65 year old male who presents for preventative health visit.       Patient has been advised of split billing requirements and indicates understanding: Yes  Healthy Habits:     Getting at least 3 servings of Calcium per day:  Yes    Bi-annual eye exam:  Yes    Dental care twice a year:  Yes    Sleep apnea or symptoms of sleep apnea:  Sleep apnea    Diet:  Breakfast skipped    Frequency of exercise:  2-3 days/week    Duration of exercise:  15-30 minutes    Taking medications regularly:  Yes    Medication side effects:  Not applicable    PHQ-2 Total Score: 0    Additional concerns today:  No    Recently s/p cholecystectomy following episode of gallstone pancreatitis earlier this year. Still getting some occasional epigastric pain around laparascopic incision site but seems to be improving.     Follows at [facility] for Hep B management.     Has been struggling with some R shoulder pain, thought it might be due to positioning with surgery but not getting better.       Today's PHQ-2 Score:   PHQ-2 ( 1999 [employer]) 7/5/2021   Q1: Little interest or pleasure in doing things 0   Q2: Feeling down, depressed or hopeless 0   PHQ-2 Score 0   Q1: Little interest or pleasure in doing things Not at all   Q2: Feeling down, depressed or hopeless Not at all   PHQ-2 Score 0       Abuse: Current or Past(Physical, Sexual or Emotional)- No  Do you feel safe in your environment? Yes    Have you ever done Advance Care Planning? (For example, a Health Directive, POLST, or a discussion with a medical provider or your loved ones about your wishes): No, advance care planning information given to patient to review.  Patient declined advance care planning discussion at this time.    Social History     Tobacco Use     Smoking status: Never Smoker     Smokeless tobacco: Never Used   Substance Use Topics     Alcohol use: Not Currently     If you drink alcohol do you typically have >3 drinks per day or >7 drinks per  week? No    Alcohol Use 7/5/2021   Prescreen: >3 drinks/day or >7 drinks/week? Not Applicable   Prescreen: >3 drinks/day or >7 drinks/week? -       Last PSA: No results found for: PSA    Reviewed orders with patient. Reviewed health maintenance and updated orders accordingly - Yes  Patient Active Problem List   Diagnosis     Benign prostatic hyperplasia     Bilateral renal cysts     Dermatochalasis of eyelids of both eyes     Esophageal reflux     Essential hypertension     Urgency of urination     Inflammatory liver disease     Hepatitis B virus infection     Hyperlipidemia     Nonallopathic lesion of lumbar region     Osteoarthrosis     Spasm of muscle     Hepatitis B, chronic (H)     Gallstone pancreatitis     Past Surgical History:   Procedure Laterality Date     COLONOSCOPY       LAPAROSCOPIC CHOLECYSTECTOMY WITH CHOLANGIOGRAMS N/A 6/16/2021    Procedure: LAPAROSCOPIC CHOLECYSTECTOMY WITH CHOLANGIOGRAM;  Surgeon: Loulou Camara MD;  Location:  OR       Social History     Tobacco Use     Smoking status: Never Smoker     Smokeless tobacco: Never Used   Substance Use Topics     Alcohol use: Not Currently     Family History   Problem Relation Age of Onset     Glaucoma Father      Diabetes Type 2  Mother      Hepatitis Sister      Hypertension Brother      Macular Degeneration No family hx of            Reviewed and updated as needed this visit by clinical staff  Tobacco  Allergies  Meds   Med Hx  Surg Hx  Fam Hx  Soc Hx        Reviewed and updated as needed this visit by Provider                    Review of Systems   Gastrointestinal: Positive for heartburn.   Genitourinary: Positive for frequency.     CONSTITUTIONAL: NEGATIVE for fever, chills, change in weight  INTEGUMENTARY/SKIN: NEGATIVE for worrisome rashes, moles or lesions  EYES: NEGATIVE for vision changes or irritation  ENT: NEGATIVE for ear, mouth and throat problems  RESP: NEGATIVE for significant cough or SOB  CV: NEGATIVE for chest pain,  "palpitations or peripheral edema  GI: NEGATIVE for nausea, abdominal pain, heartburn, or change in bowel habits   male: negative for dysuria, hematuria, decreased urinary stream, erectile dysfunction, urethral discharge  MUSCULOSKELETAL: NEGATIVE for significant arthralgias or myalgia  NEURO: NEGATIVE for weakness, dizziness or paresthesias  PSYCHIATRIC: NEGATIVE for changes in mood or affect    OBJECTIVE:   /78 (BP Location: Right arm, Patient Position: Sitting, Cuff Size: Adult Regular)   Pulse 57   Temp 97.3  F (36.3  C) (Tympanic)   Resp 18   Ht 1.778 m (5' 10\")   Wt 70.1 kg (154 lb 8 oz)   SpO2 99%   BMI 22.17 kg/m      Physical Exam  GENERAL: healthy, alert and no distress  EYES: Eyes grossly normal to inspection, PERRL and conjunctivae and sclerae normal  HENT: ear canals and TM's normal, nose and mouth without ulcers or lesions  NECK: no adenopathy, no asymmetry, masses, or scars and thyroid normal to palpation  RESP: lungs clear to auscultation - no rales, rhonchi or wheezes  CV: regular rate and rhythm, normal S1 S2, no S3 or S4, no murmur, click or rub, no peripheral edema and peripheral pulses strong  ABDOMEN: soft, + mild epigastric pain but otherwise nontender, no hepatosplenomegaly, no masses and bowel sounds normal  MS: no gross musculoskeletal defects noted, no edema. Tenderness over supraspinatous muscle. +Neer's test  SKIN: no suspicious lesions or rashes  NEURO: Normal strength and tone, mentation intact and speech normal  PSYCH: mentation appears normal, affect normal/bright    Diagnostic Test Results:  Labs reviewed in Epic    ASSESSMENT/PLAN:   1. Routine general medical examination at a health care facility  Counseling as below. Discussed that if he continues to have epigastric pain to consider reaching out to his surgeon's office.   - Lipid panel reflex to direct LDL Fasting    2. Screening for prostate cancer  - PSA, screen    3. Anemia, unspecified type  Noted " "post-operatively; otherwise stable. Will obtain iron levels today. Plan to follow, consider further work-up as indicated. Colonoscopy recent and up to date.   - Ferritin  - Iron and iron binding capacity    4. Chronic right shoulder pain  Most likely impingement on exam, OA also possible. Good ROM. Will obtain radiograph and if reassuring refer to physical therapy. Consider injection if not improving.   - XR Shoulder Right G/E 3 Views; Future  - [facility] REFERRAL; Future    COUNSELING:   Reviewed preventive health counseling, as reflected in patient instructions       Regular exercise       Healthy diet/nutrition       Vision screening       Hearing screening       HIV screeninx in teen years, 1x in adult years, and at intervals if high risk       Colon cancer screening       Prostate cancer screening    Estimated body mass index is 22.17 kg/m  as calculated from the following:    Height as of this encounter: 1.778 m (5' 10\").    Weight as of this encounter: 70.1 kg (154 lb 8 oz).         He reports that he has never smoked. He has never used smokeless tobacco.      Counseling Resources:  ATP IV Guidelines  Pooled Cohorts Equation Calculator  FRAX Risk Assessment  ICSI Preventive Guidelines  Dietary Guidelines for Americans,   Prevacus's MyPlate  ASA Prophylaxis  Lung CA Screening    Yaima Allen MD  [facility]  "

## 2021-07-05 NOTE — PATIENT INSTRUCTIONS
We will do some tests to see if low iron levels are causing your anemia.     Let me know if you lose more weight - I expect your weight to continue to go back up once you are feeling better from surgery.    If you continue to have pain at surgery site after another 1-2 weeks and it isn't getting better - let me know and we can help you follow-up with the surgeon's office.     Shoulder X-ray to look for arthritis - if there is arthritis, next step would be to see orthopedic doctor. If no arthritis, I think this would be impingement syndrome and we could try ibuprofen, ice and physical therapy.       Preventive Health Recommendations  Male Ages 50 - 64    Yearly exam:             See your health care provider every year in order to  o   Review health changes.   o   Discuss preventive care.    o   Review your medicines if your doctor has prescribed any.     Have a cholesterol test every 5 years, or more frequently if you are at risk for high cholesterol/heart disease.     Have a diabetes test (fasting glucose) every three years. If you are at risk for diabetes, you should have this test more often.     Have a colonoscopy at age 50, or have a yearly FIT test (stool test). These exams will check for colon cancer.      Talk with your health care provider about whether or not a prostate cancer screening test (PSA) is right for you.    You should be tested each year for STDs (sexually transmitted diseases), if you re at risk.     Shots: Get a flu shot each year. Get a tetanus shot every 10 years.     Nutrition:    Eat at least 5 servings of fruits and vegetables daily.     Eat whole-grain bread, whole-wheat pasta and brown rice instead of white grains and rice.     Get adequate Calcium and Vitamin D.     Lifestyle    Exercise for at least 150 minutes a week (30 minutes a day, 5 days a week). This will help you control your weight and prevent disease.     Limit alcohol to one drink per day.     No smoking.     Wear sunscreen  to prevent skin cancer.     See your dentist every six months for an exam and cleaning.     See your eye doctor every 1 to 2 years.    Preventive Health Recommendations  Male Ages 50 - 64    Yearly exam:             See your health care provider every year in order to  o   Review health changes.   o   Discuss preventive care.    o   Review your medicines if your doctor has prescribed any.     Have a cholesterol test every 5 years, or more frequently if you are at risk for high cholesterol/heart disease.     Have a diabetes test (fasting glucose) every three years. If you are at risk for diabetes, you should have this test more often.     Have a colonoscopy at age 50, or have a yearly FIT test (stool test). These exams will check for colon cancer.      Talk with your health care provider about whether or not a prostate cancer screening test (PSA) is right for you.    You should be tested each year for STDs (sexually transmitted diseases), if you re at risk.     Shots: Get a flu shot each year. Get a tetanus shot every 10 years.     Nutrition:    Eat at least 5 servings of fruits and vegetables daily.     Eat whole-grain bread, whole-wheat pasta and brown rice instead of white grains and rice.     Get adequate Calcium and Vitamin D.     Lifestyle    Exercise for at least 150 minutes a week (30 minutes a day, 5 days a week). This will help you control your weight and prevent disease.     Limit alcohol to one drink per day.     No smoking.     Wear sunscreen to prevent skin cancer.     See your dentist every six months for an exam and cleaning.     See your eye doctor every 1 to 2 years.

## 2021-07-06 LAB
CHOLEST SERPL-MCNC: 139 MG/DL
FERRITIN SERPL-MCNC: 139 NG/ML (ref 26–388)
HDLC SERPL-MCNC: 43 MG/DL
IRON SATN MFR SERPL: 25 % (ref 15–46)
IRON SERPL-MCNC: 60 UG/DL (ref 35–180)
LDLC SERPL CALC-MCNC: 84 MG/DL
NONHDLC SERPL-MCNC: 96 MG/DL
PSA SERPL-ACNC: 0.58 UG/L (ref 0–4)
TIBC SERPL-MCNC: 238 UG/DL (ref 240–430)
TRIGL SERPL-MCNC: 61 MG/DL

## 2021-09-19 ENCOUNTER — HEALTH MAINTENANCE LETTER (OUTPATIENT)
Age: 66
End: 2021-09-19

## 2021-10-11 DIAGNOSIS — B18.1 CHRONIC VIRAL HEPATITIS B WITHOUT DELTA AGENT AND WITHOUT COMA (H): ICD-10-CM

## 2021-10-11 RX ORDER — TENOFOVIR DISOPROXIL FUMARATE 300 MG/1
300 TABLET, FILM COATED ORAL DAILY
Qty: 30 TABLET | Refills: 2 | Status: SHIPPED | OUTPATIENT
Start: 2021-10-11 | End: 2021-12-20

## 2021-10-12 ENCOUNTER — TELEPHONE (OUTPATIENT)
Dept: GASTROENTEROLOGY | Facility: CLINIC | Age: 66
End: 2021-10-12

## 2021-10-12 DIAGNOSIS — B18.1 CHRONIC VIRAL HEPATITIS B WITHOUT DELTA AGENT AND WITHOUT COMA (H): Primary | ICD-10-CM

## 2021-10-12 NOTE — TELEPHONE ENCOUNTER
Ultrasound order placed.    Fariha STEELE LPN  Hepatology Clinic    [facility] Call Center    Phone Message    May a detailed message be left on voicemail: yes     Reason for Call: Other: Pt is wanting a referral for ultrasound.     Action Taken: Message routed to:  [facility]    Travel Screening: Not Applicable

## 2021-10-21 DIAGNOSIS — B18.1 CHRONIC VIRAL HEPATITIS B WITHOUT DELTA AGENT AND WITHOUT COMA (H): Primary | ICD-10-CM

## 2021-12-10 ENCOUNTER — LAB (OUTPATIENT)
Dept: LAB | Facility: CLINIC | Age: 66
End: 2021-12-10
Payer: COMMERCIAL

## 2021-12-10 DIAGNOSIS — B18.1 CHRONIC VIRAL HEPATITIS B WITHOUT DELTA AGENT AND WITHOUT COMA (H): ICD-10-CM

## 2021-12-10 LAB
ERYTHROCYTE [DISTWIDTH] IN BLOOD BY AUTOMATED COUNT: 12.7 % (ref 10–15)
HCT VFR BLD AUTO: 36.9 % (ref 40–53)
HGB BLD-MCNC: 12.9 G/DL (ref 13.3–17.7)
MCH RBC QN AUTO: 32.4 PG (ref 26.5–33)
MCHC RBC AUTO-ENTMCNC: 35 G/DL (ref 31.5–36.5)
MCV RBC AUTO: 93 FL (ref 78–100)
PLATELET # BLD AUTO: 168 10E3/UL (ref 150–450)
RBC # BLD AUTO: 3.98 10E6/UL (ref 4.4–5.9)
WBC # BLD AUTO: 5.6 10E3/UL (ref 4–11)

## 2021-12-10 PROCEDURE — 82248 BILIRUBIN DIRECT: CPT

## 2021-12-10 PROCEDURE — 80053 COMPREHEN METABOLIC PANEL: CPT

## 2021-12-10 PROCEDURE — 87517 HEPATITIS B DNA QUANT: CPT

## 2021-12-10 PROCEDURE — 85027 COMPLETE CBC AUTOMATED: CPT

## 2021-12-10 PROCEDURE — 36415 COLL VENOUS BLD VENIPUNCTURE: CPT

## 2021-12-10 PROCEDURE — 85610 PROTHROMBIN TIME: CPT

## 2021-12-11 LAB — INR PPP: 1.43 (ref 0.85–1.15)

## 2021-12-13 LAB
ALBUMIN SERPL-MCNC: 3.6 G/DL (ref 3.4–5)
ALP SERPL-CCNC: 61 U/L (ref 40–150)
ALT SERPL W P-5'-P-CCNC: 31 U/L (ref 0–70)
ANION GAP SERPL CALCULATED.3IONS-SCNC: 6 MMOL/L (ref 3–14)
AST SERPL W P-5'-P-CCNC: 20 U/L (ref 0–45)
BILIRUB DIRECT SERPL-MCNC: 0.2 MG/DL (ref 0–0.2)
BILIRUB SERPL-MCNC: 0.8 MG/DL (ref 0.2–1.3)
BUN SERPL-MCNC: 18 MG/DL (ref 7–30)
CALCIUM SERPL-MCNC: 8.7 MG/DL (ref 8.5–10.1)
CHLORIDE BLD-SCNC: 108 MMOL/L (ref 94–109)
CO2 SERPL-SCNC: 25 MMOL/L (ref 20–32)
CREAT SERPL-MCNC: 1 MG/DL (ref 0.66–1.25)
GFR SERPL CREATININE-BSD FRML MDRD: 79 ML/MIN/1.73M2
GLUCOSE BLD-MCNC: 78 MG/DL (ref 70–99)
POTASSIUM BLD-SCNC: 3.7 MMOL/L (ref 3.4–5.3)
PROT SERPL-MCNC: 7.5 G/DL (ref 6.8–8.8)
SODIUM SERPL-SCNC: 139 MMOL/L (ref 133–144)

## 2021-12-14 LAB
HBV DNA SERPL NAA+PROBE-ACNC: 214 IU/ML
HBV DNA SERPL NAA+PROBE-LOG IU: 2.3 {LOG_IU}/ML

## 2021-12-17 ENCOUNTER — HOSPITAL ENCOUNTER (OUTPATIENT)
Dept: ULTRASOUND IMAGING | Facility: CLINIC | Age: 66
Discharge: HOME | End: 2021-12-17
Attending: PHYSICIAN ASSISTANT | Admitting: PHYSICIAN ASSISTANT
Payer: COMMERCIAL

## 2021-12-17 DIAGNOSIS — B18.1 CHRONIC VIRAL HEPATITIS B WITHOUT DELTA AGENT AND WITHOUT COMA (H): ICD-10-CM

## 2021-12-17 PROCEDURE — 76700 US EXAM ABDOM COMPLETE: CPT

## 2021-12-20 ENCOUNTER — TELEPHONE (OUTPATIENT)
Dept: GASTROENTEROLOGY | Facility: CLINIC | Age: 66
End: 2021-12-20
Payer: COMMERCIAL

## 2021-12-20 ENCOUNTER — VIRTUAL VISIT (OUTPATIENT)
Dept: GASTROENTEROLOGY | Facility: CLINIC | Age: 66
End: 2021-12-20
Attending: PHYSICIAN ASSISTANT
Payer: COMMERCIAL

## 2021-12-20 DIAGNOSIS — B18.1 CHRONIC VIRAL HEPATITIS B WITHOUT DELTA AGENT AND WITHOUT COMA (H): ICD-10-CM

## 2021-12-20 PROCEDURE — 99214 OFFICE O/P EST MOD 30 MIN: CPT | Mod: 95 | Performed by: PHYSICIAN ASSISTANT

## 2021-12-20 RX ORDER — TENOFOVIR DISOPROXIL FUMARATE 300 MG/1
300 TABLET, FILM COATED ORAL DAILY
Qty: 30 TABLET | Refills: 2 | Status: SHIPPED | OUTPATIENT
Start: 2021-12-20 | End: 2022-03-25

## 2021-12-20 ASSESSMENT — PAIN SCALES - GENERAL: PAINLEVEL: NO PAIN (0)

## 2021-12-20 NOTE — PROGRESS NOTES
Sadie is a 66 year old who is being evaluated via a billable video visit.      How would you like to obtain your AVS? MyChart  If the video visit is dropped, the invitation should be resent by: Text to cell phone: [phone]  Will anyone else be joining your video visit? No      Video Start Time: 11:21 am   Video-Visit Details    Type of service:  Video Visit    Video End Time:11:31 am     Originating Location (pt. Location): Home    Distant Location (provider location):  [facility] [place]     Platform used for Video Visit: [facility]       Hepatology Clinic note  Sadie Olea   Date of Birth 1955  Date of Service 12/20/2021         Assessment/plan:   Sadie Olea is a 66 year old male with chronic hepatitis B, e antigen positive and e antibody negative, who has been maintained on Tenofovir DF for active disease and has good compliance. MELD-Na 10. He has low normal plaletes. Fibrosis scan in 8/26/2019: F0-F1, 5.9 kilopascals, but low platelets and elevated INR concerning for cirrhosis. Recent ultrasound showing new liver lesion which should be better characterized with an MRI.     # Lesion noted on recent US imaging:   - MRI Abdomen in near future     # Continue Tenofovir  mg daily     # FibroScan in future     # Continue routine PCP follow up for health optimization     # Follow-up in clinic in six months     Seth Nguyen PA-C   [facility] Hepatology clinic    -----------------------------------------------------       HPI:   Sadie Olea is a 66 year old male  who presents to clinic today for the following health issues:     Hepatitis B  E antigen positive (8/22/2018)  E antibody negative 98/22/2018)   -Diagnosed: 2009  -History: ? Jaundice at age 42   -MRE 2011: normal fibrosis   Fibrosis scan: 8/26/2019: F0-F1, 5.9 kilopascals  -Prior treatments:   Started on tenofovir DF in 2015 due to 10 year HCC risk   Changed to tenofovir AF in May 2019  Changed back to  tenofovir DF due to insurance    Patient was last seen by me in clinic on 8/24/2020. Had gallstone pancreatitis in June 2021. He underwent a cholecystectomy with an intraoperative cholangiogram.     Appetite is okay. Weight is slowly doing down, now back up again. Still have some epigastric discomfort (2-3/10).     Stools on a few occassions have been black (3-4 over the past year).     Patient denies jaundice, lower extremity edema, abdominal distension or confusion.  Patient also denies melena, hematochezia or hematemesis.Patient denies  fevers, sweats or chills.    He does not drink alcohol. 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     Medical hx Surgical hx   Past Medical History:   Diagnosis Date     GERD (gastroesophageal reflux disease)      HBV (hepatitis B virus) infection      Hyperlipidemia     Past Surgical History:   Procedure Laterality Date     COLONOSCOPY       LAPAROSCOPIC CHOLECYSTECTOMY WITH CHOLANGIOGRAMS N/A 6/16/2021    Procedure: LAPAROSCOPIC CHOLECYSTECTOMY WITH CHOLANGIOGRAM;  Surgeon: Loulou Camara MD;  Location:  OR                 Physical Exam:   GENERAL: healthy, alert and no distress  EYES: Eyes grossly normal to inspection, conjunctivae and sclerae normal  RESP: no audible wheeze, cough, or visible cyanosis.  No visible retractions or increased work of breathing.  Able to speak fully in complete sentences  NEURO: Cranial nerves grossly intact, mentation intact and speech normal  PSYCH: mentation appears normal, affect normal/bright, judgement and insight intact, normal speech and appearance well-groomed           Data:   Reviewed in person and significant for:    Lab Results   Component Value Date     12/10/2021     06/08/2021      Lab Results   Component Value Date    POTASSIUM 3.7 12/10/2021    POTASSIUM 3.5 06/08/2021     Lab Results   Component Value Date    CHLORIDE 108 12/10/2021    CHLORIDE 107 06/08/2021     Lab Results   Component Value Date    CO2 25 12/10/2021     CO2 28 06/08/2021     Lab Results   Component Value Date    BUN 18 12/10/2021    BUN 18 06/08/2021     Lab Results   Component Value Date    CR 1.00 12/10/2021    CR 0.97 06/08/2021       Lab Results   Component Value Date    WBC 5.6 12/10/2021    WBC 4.7 06/08/2021     Lab Results   Component Value Date    HGB 12.9 12/10/2021    HGB 12.3 06/08/2021     Lab Results   Component Value Date    HCT 36.9 12/10/2021    HCT 35.6 06/08/2021     Lab Results   Component Value Date    MCV 93 12/10/2021    MCV 93 06/08/2021     Lab Results   Component Value Date     12/10/2021     06/08/2021       Lab Results   Component Value Date    AST 20 12/10/2021    AST 17 06/08/2021     Lab Results   Component Value Date    ALT 31 12/10/2021    ALT 25 06/08/2021     No results found for: BILICONJ   Lab Results   Component Value Date    BILITOTAL 0.8 12/10/2021    BILITOTAL 0.8 06/08/2021       Lab Results   Component Value Date    ALBUMIN 3.6 12/10/2021    ALBUMIN 3.7 06/08/2021     Lab Results   Component Value Date    PROTTOTAL 7.5 12/10/2021    PROTTOTAL 7.1 06/08/2021      Lab Results   Component Value Date    ALKPHOS 61 12/10/2021    ALKPHOS 51 06/08/2021       Lab Results   Component Value Date    INR 1.43 12/10/2021    INR 1.15 06/08/2021       US Abdomen Complete    Result Date: 12/17/2021  ULTRASOUND ABDOMEN COMPLETE December 17, 2021 10:52 AM HISTORY: HCC surveillance. Chronic viral hepatitis B without delta agent and without coma (H). COMPARISON: Ultrasound abdomen 6/8/2021. FINDINGS:  Gallbladder: Absent. Bile ducts: Common duct is 9 mm. No intrahepatic biliary ductal dilatation identified. Liver: Liver is 12.5 cm. There is a hyperechoic focal nodular lesion within the inferior aspect of the right liver that is 2.0 x 1.3 x 1.1 cm. This is newly identified. Pancreas: Partially obscured by overlying bowel gas,  but grossly unremarkable. Spleen: Normal. Right kidney: Complex cystic lesion with ill-defined internal  septation versus small nodularity at the upper right kidney is 1.8 x 1.5 x 1.4 cm, stable in size. No hydronephrosis. Potential tiny intrarenal stones. Left kidney: Stable cyst at the lower left kidney that is 2.2 x 1.7 x 1.7 cm that appears simple. No hydronephrosis. Potential tiny intrarenal stones. Aorta and IVC: Not specifically assessed.      IMPRESSION:  1. New finding of a hyperechoic nodular lesion along the undersurface of the right liver measuring 2 cm. A neoplasm remains a possibility and liver MRI is recommended. 2. Complex cystic lesion is stable in size at the upper right kidney. Suggest further characterization with renal MRI. Stable cyst at the lower left kidney. 3. Cholecystectomy. Mild distention of the common duct that may relate to cholecystectomy. 4. Potential tiny intrarenal stones within each kidney. This could be confirmed with CT if clinically indicated. ISAIAS YOUNG MD   SYSTEM ID:  [id number]      Intra-op Cholangiogram:   6/16/2021:   IMPRESSION: 8 spot fluoroscopic images demonstrate cannulation of the  cystic duct with contrast opacification of the cystic, common bile,  and intrahepatic ducts. There is flow of contrast into the duodenum.  No definite choledocholithiasis. No intrahepatic ductal dilatation.  The common bile duct is moderately dilated.

## 2021-12-20 NOTE — TELEPHONE ENCOUNTER
MRI order sent to [facility].  Patient notified via [employer].    Fariha STEELE LPN  Hepatology Clinic      [facility] Call Center    Phone Message    May a detailed message be left on voicemail: yes     Reason for Call: Other: Pt called in requesting that a referral be sent to [facility] for the MRI. Please reach out. Thank you.     Action Taken: Message routed to:  [facility]    Travel Screening: Not Applicable

## 2021-12-20 NOTE — LETTER
12/20/2021     RE: Sadie Olea  [address]  [place] [address]    Dear Colleague,    Thank you for referring your patient, Sadie Olea, to the [facility]PATOLOG[facility] [place]. Please see a copy of my visit note below.    Sadie is a 66 year old who is being evaluated via a billable video visit.      How would you like to obtain your AVS? MyChart  If the video visit is dropped, the invitation should be resent by: Text to cell phone: [phone]  Will anyone else be joining your video visit? No      Video Start Time: 11:21 am   Video-Visit Details    Type of service:  Video Visit    Video End Time:11:31 am     Originating Location (pt. Location): Home    Distant Location (provider location):  [facility] HEPATOLOGY [facility]     Platform used for Video Visit: [facility]       Hepatology Clinic note  Sadie Olea   Date of Birth 1955  Date of Service 12/20/2021         Assessment/plan:   Sadie Olea is a 66 year old male with chronic hepatitis B, e antigen positive and e antibody negative, who has been maintained on Tenofovir DF for active disease and has good compliance. MELD-Na 10. He has low normal plaletes. Fibrosis scan in 8/26/2019: F0-F1, 5.9 kilopascals, but low platelets and elevated INR concerning for cirrhosis. Recent ultrasound showing new liver lesion which should be better characterized with an MRI.     # Lesion noted on recent US imaging:   - MRI Abdomen in near future     # Continue Tenofovir  mg daily     # FibroScan in future     # Continue routine PCP follow up for health optimization     # Follow-up in clinic in six months     Seth Nguyen PA-C   [facility] Hepatology clinic    -----------------------------------------------------       HPI:   Sadie Olea is a 66 year old male  who presents to clinic today for the following health issues:     Hepatitis B  E antigen positive (8/22/2018)  E antibody negative 98/22/2018)   -Diagnosed:  2009  -History: ? Jaundice at age 42   -MRE 2011: normal fibrosis   Fibrosis scan: 8/26/2019: F0-F1, 5.9 kilopascals  -Prior treatments:   Started on tenofovir DF in 2015 due to 10 year HCC risk   Changed to tenofovir AF in May 2019  Changed back to tenofovir DF due to insurance    Patient was last seen by me in clinic on 8/24/2020. Had gallstone pancreatitis in June 2021. He underwent a cholecystectomy with an intraoperative cholangiogram.     Appetite is okay. Weight is slowly doing down, now back up again. Still have some epigastric discomfort (2-3/10).     Stools on a few occassions have been black (3-4 over the past year).     Patient denies jaundice, lower extremity edema, abdominal distension or confusion.  Patient also denies melena, hematochezia or hematemesis.Patient denies  fevers, sweats or chills.    He does not drink alcohol. 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     Medical hx Surgical hx   Past Medical History:   Diagnosis Date     GERD (gastroesophageal reflux disease)      HBV (hepatitis B virus) infection      Hyperlipidemia     Past Surgical History:   Procedure Laterality Date     COLONOSCOPY       LAPAROSCOPIC CHOLECYSTECTOMY WITH CHOLANGIOGRAMS N/A 6/16/2021    Procedure: LAPAROSCOPIC CHOLECYSTECTOMY WITH CHOLANGIOGRAM;  Surgeon: Loulou Camara MD;  Location:  OR                 Physical Exam:   GENERAL: healthy, alert and no distress  EYES: Eyes grossly normal to inspection, conjunctivae and sclerae normal  RESP: no audible wheeze, cough, or visible cyanosis.  No visible retractions or increased work of breathing.  Able to speak fully in complete sentences  NEURO: Cranial nerves grossly intact, mentation intact and speech normal  PSYCH: mentation appears normal, affect normal/bright, judgement and insight intact, normal speech and appearance well-groomed           Data:   Reviewed in person and significant for:    Lab Results   Component Value Date     12/10/2021     06/08/2021       Lab Results   Component Value Date    POTASSIUM 3.7 12/10/2021    POTASSIUM 3.5 06/08/2021     Lab Results   Component Value Date    CHLORIDE 108 12/10/2021    CHLORIDE 107 06/08/2021     Lab Results   Component Value Date    CO2 25 12/10/2021    CO2 28 06/08/2021     Lab Results   Component Value Date    BUN 18 12/10/2021    BUN 18 06/08/2021     Lab Results   Component Value Date    CR 1.00 12/10/2021    CR 0.97 06/08/2021       Lab Results   Component Value Date    WBC 5.6 12/10/2021    WBC 4.7 06/08/2021     Lab Results   Component Value Date    HGB 12.9 12/10/2021    HGB 12.3 06/08/2021     Lab Results   Component Value Date    HCT 36.9 12/10/2021    HCT 35.6 06/08/2021     Lab Results   Component Value Date    MCV 93 12/10/2021    MCV 93 06/08/2021     Lab Results   Component Value Date     12/10/2021     06/08/2021       Lab Results   Component Value Date    AST 20 12/10/2021    AST 17 06/08/2021     Lab Results   Component Value Date    ALT 31 12/10/2021    ALT 25 06/08/2021     No results found for: BILICONJ   Lab Results   Component Value Date    BILITOTAL 0.8 12/10/2021    BILITOTAL 0.8 06/08/2021       Lab Results   Component Value Date    ALBUMIN 3.6 12/10/2021    ALBUMIN 3.7 06/08/2021     Lab Results   Component Value Date    PROTTOTAL 7.5 12/10/2021    PROTTOTAL 7.1 06/08/2021      Lab Results   Component Value Date    ALKPHOS 61 12/10/2021    ALKPHOS 51 06/08/2021       Lab Results   Component Value Date    INR 1.43 12/10/2021    INR 1.15 06/08/2021       US Abdomen Complete    Result Date: 12/17/2021  ULTRASOUND ABDOMEN COMPLETE December 17, 2021 10:52 AM HISTORY: HCC surveillance. Chronic viral hepatitis B without delta agent and without coma (H). COMPARISON: Ultrasound abdomen 6/8/2021. FINDINGS:  Gallbladder: Absent. Bile ducts: Common duct is 9 mm. No intrahepatic biliary ductal dilatation identified. Liver: Liver is 12.5 cm. There is a hyperechoic focal nodular lesion within  the inferior aspect of the right liver that is 2.0 x 1.3 x 1.1 cm. This is newly identified. Pancreas: Partially obscured by overlying bowel gas,  but grossly unremarkable. Spleen: Normal. Right kidney: Complex cystic lesion with ill-defined internal septation versus small nodularity at the upper right kidney is 1.8 x 1.5 x 1.4 cm, stable in size. No hydronephrosis. Potential tiny intrarenal stones. Left kidney: Stable cyst at the lower left kidney that is 2.2 x 1.7 x 1.7 cm that appears simple. No hydronephrosis. Potential tiny intrarenal stones. Aorta and IVC: Not specifically assessed.      IMPRESSION:  1. New finding of a hyperechoic nodular lesion along the undersurface of the right liver measuring 2 cm. A neoplasm remains a possibility and liver MRI is recommended. 2. Complex cystic lesion is stable in size at the upper right kidney. Suggest further characterization with renal MRI. Stable cyst at the lower left kidney. 3. Cholecystectomy. Mild distention of the common duct that may relate to cholecystectomy. 4. Potential tiny intrarenal stones within each kidney. This could be confirmed with CT if clinically indicated. ISAIAS YOUNG MD   SYSTEM ID:  [id number]      Intra-op Cholangiogram:   6/16/2021:   IMPRESSION: 8 spot fluoroscopic images demonstrate cannulation of the  cystic duct with contrast opacification of the cystic, common bile,  and intrahepatic ducts. There is flow of contrast into the duodenum.  No definite choledocholithiasis. No intrahepatic ductal dilatation.  The common bile duct is moderately dilated.    Again, thank you for allowing me to participate in the care of your patient.      Sincerely,      Seth Nguyen PA-C

## 2021-12-29 ENCOUNTER — TRANSFERRED RECORDS (OUTPATIENT)
Dept: HEALTH INFORMATION MANAGEMENT | Facility: CLINIC | Age: 66
End: 2021-12-29
Payer: COMMERCIAL

## 2021-12-30 ENCOUNTER — TELEPHONE (OUTPATIENT)
Dept: GASTROENTEROLOGY | Facility: CLINIC | Age: 66
End: 2021-12-30
Payer: COMMERCIAL

## 2022-01-07 ENCOUNTER — TELEPHONE (OUTPATIENT)
Dept: GASTROENTEROLOGY | Facility: CLINIC | Age: 67
End: 2022-01-07
Payer: COMMERCIAL

## 2022-01-07 NOTE — TELEPHONE ENCOUNTER
[facility]    Phone Message    May a detailed message be left on voicemail: yes     Reason for Call: Requesting Results   Name/type of test: MRI  Date of test: 12/29/21  Was test done at a location other than [facility] (Please fill in the location if not [facility])?: Yes: [facility]    NOTE: Patient would like results in [facility], too.      Action Taken: Message routed to:  [facility] ([facility] [place] [facility]    Travel Screening: Not Applicable

## 2022-03-25 DIAGNOSIS — B18.1 CHRONIC VIRAL HEPATITIS B WITHOUT DELTA AGENT AND WITHOUT COMA (H): ICD-10-CM

## 2022-03-25 RX ORDER — TENOFOVIR DISOPROXIL FUMARATE 300 MG/1
300 TABLET, FILM COATED ORAL DAILY
Qty: 90 TABLET | Refills: 3 | Status: SHIPPED | OUTPATIENT
Start: 2022-03-25 | End: 2023-02-27

## 2022-05-31 DIAGNOSIS — B18.1 CHRONIC VIRAL HEPATITIS B WITHOUT DELTA AGENT AND WITHOUT COMA (H): Primary | ICD-10-CM

## 2022-06-06 ENCOUNTER — LAB (OUTPATIENT)
Dept: LAB | Facility: CLINIC | Age: 67
End: 2022-06-06
Payer: COMMERCIAL

## 2022-06-06 DIAGNOSIS — B18.1 CHRONIC VIRAL HEPATITIS B WITHOUT DELTA AGENT AND WITHOUT COMA (H): ICD-10-CM

## 2022-06-06 LAB
ERYTHROCYTE [DISTWIDTH] IN BLOOD BY AUTOMATED COUNT: 12.2 % (ref 10–15)
HCT VFR BLD AUTO: 34.8 % (ref 40–53)
HGB BLD-MCNC: 12.5 G/DL (ref 13.3–17.7)
MCH RBC QN AUTO: 32.4 PG (ref 26.5–33)
MCHC RBC AUTO-ENTMCNC: 35.9 G/DL (ref 31.5–36.5)
MCV RBC AUTO: 90 FL (ref 78–100)
PLATELET # BLD AUTO: 163 10E3/UL (ref 150–450)
RBC # BLD AUTO: 3.86 10E6/UL (ref 4.4–5.9)
WBC # BLD AUTO: 4.9 10E3/UL (ref 4–11)

## 2022-06-06 PROCEDURE — 85027 COMPLETE CBC AUTOMATED: CPT

## 2022-06-06 PROCEDURE — 36415 COLL VENOUS BLD VENIPUNCTURE: CPT

## 2022-06-06 PROCEDURE — 87517 HEPATITIS B DNA QUANT: CPT

## 2022-06-06 PROCEDURE — 80053 COMPREHEN METABOLIC PANEL: CPT

## 2022-06-06 PROCEDURE — 85610 PROTHROMBIN TIME: CPT

## 2022-06-06 PROCEDURE — 82248 BILIRUBIN DIRECT: CPT

## 2022-06-07 LAB
ALBUMIN SERPL-MCNC: 3.9 G/DL (ref 3.4–5)
ALP SERPL-CCNC: 59 U/L (ref 40–150)
ALT SERPL W P-5'-P-CCNC: 28 U/L (ref 0–70)
ANION GAP SERPL CALCULATED.3IONS-SCNC: 7 MMOL/L (ref 3–14)
AST SERPL W P-5'-P-CCNC: 21 U/L (ref 0–45)
BILIRUB DIRECT SERPL-MCNC: 0.1 MG/DL (ref 0–0.2)
BILIRUB SERPL-MCNC: 0.6 MG/DL (ref 0.2–1.3)
BUN SERPL-MCNC: 17 MG/DL (ref 7–30)
CALCIUM SERPL-MCNC: 8.9 MG/DL (ref 8.5–10.1)
CHLORIDE BLD-SCNC: 106 MMOL/L (ref 94–109)
CO2 SERPL-SCNC: 26 MMOL/L (ref 20–32)
CREAT SERPL-MCNC: 0.95 MG/DL (ref 0.66–1.25)
GFR SERPL CREATININE-BSD FRML MDRD: 89 ML/MIN/1.73M2
GLUCOSE BLD-MCNC: 96 MG/DL (ref 70–99)
INR PPP: 1.1 (ref 0.85–1.15)
POTASSIUM BLD-SCNC: 3.7 MMOL/L (ref 3.4–5.3)
PROT SERPL-MCNC: 7.4 G/DL (ref 6.8–8.8)
SODIUM SERPL-SCNC: 139 MMOL/L (ref 133–144)

## 2022-06-09 LAB
HBV DNA SERPL NAA+PROBE-ACNC: 114 IU/ML
HBV DNA SERPL NAA+PROBE-LOG IU: 2.1 {LOG_IU}/ML

## 2022-07-06 ENCOUNTER — HOSPITAL ENCOUNTER (OUTPATIENT)
Dept: ULTRASOUND IMAGING | Facility: CLINIC | Age: 67
Discharge: HOME | End: 2022-07-06
Attending: PHYSICIAN ASSISTANT | Admitting: PHYSICIAN ASSISTANT
Payer: COMMERCIAL

## 2022-07-06 DIAGNOSIS — B18.1 CHRONIC VIRAL HEPATITIS B WITHOUT DELTA AGENT AND WITHOUT COMA (H): ICD-10-CM

## 2022-07-06 PROCEDURE — 76705 ECHO EXAM OF ABDOMEN: CPT

## 2022-07-07 ENCOUNTER — TELEPHONE (OUTPATIENT)
Dept: GASTROENTEROLOGY | Facility: CLINIC | Age: 67
End: 2022-07-07

## 2022-07-07 DIAGNOSIS — B18.1 HEPATITIS B, CHRONIC (H): Primary | ICD-10-CM

## 2022-07-13 NOTE — TELEPHONE ENCOUNTER
MRI order faxed to [facility] ([phone]) per pt request.      Fariha STEELE LPN  Hepatology Clinic        Health Call Center    Phone Message    May a detailed message be left on voicemail: yes     Reason for Call: Other:      Pt is requesting the orders for their MRI to be sent to [facility]    Action Taken: Message routed to:  [facility] ([facility]): Hep    Travel Screening: Not Applicable

## 2022-09-01 ENCOUNTER — OFFICE VISIT (OUTPATIENT)
Dept: PEDIATRICS | Facility: CLINIC | Age: 67
End: 2022-09-01
Payer: COMMERCIAL

## 2022-09-01 VITALS
SYSTOLIC BLOOD PRESSURE: 98 MMHG | HEART RATE: 60 BPM | DIASTOLIC BLOOD PRESSURE: 70 MMHG | WEIGHT: 158.4 LBS | BODY MASS INDEX: 25.46 KG/M2 | TEMPERATURE: 97.8 F | RESPIRATION RATE: 18 BRPM | HEIGHT: 66 IN | OXYGEN SATURATION: 98 %

## 2022-09-01 DIAGNOSIS — Z87.891 HISTORY OF SMOKING: ICD-10-CM

## 2022-09-01 DIAGNOSIS — B18.1 CHRONIC VIRAL HEPATITIS B WITHOUT DELTA AGENT AND WITHOUT COMA (H): ICD-10-CM

## 2022-09-01 DIAGNOSIS — Z00.00 ROUTINE GENERAL MEDICAL EXAMINATION AT A HEALTH CARE FACILITY: Primary | ICD-10-CM

## 2022-09-01 DIAGNOSIS — L82.1 SEBORRHEIC KERATOSES: ICD-10-CM

## 2022-09-01 DIAGNOSIS — Z13.220 LIPID SCREENING: ICD-10-CM

## 2022-09-01 DIAGNOSIS — R63.4 WEIGHT LOSS: ICD-10-CM

## 2022-09-01 DIAGNOSIS — Z13.6 SCREENING FOR AAA (ABDOMINAL AORTIC ANEURYSM): ICD-10-CM

## 2022-09-01 DIAGNOSIS — N40.1 BENIGN PROSTATIC HYPERPLASIA WITH NOCTURIA: ICD-10-CM

## 2022-09-01 DIAGNOSIS — R35.1 BENIGN PROSTATIC HYPERPLASIA WITH NOCTURIA: ICD-10-CM

## 2022-09-01 DIAGNOSIS — Z12.5 SCREENING FOR PROSTATE CANCER: ICD-10-CM

## 2022-09-01 DIAGNOSIS — D64.9 ANEMIA, UNSPECIFIED TYPE: ICD-10-CM

## 2022-09-01 LAB
BASOPHILS # BLD AUTO: 0 10E3/UL (ref 0–0.2)
BASOPHILS NFR BLD AUTO: 0 %
EOSINOPHIL # BLD AUTO: 0.1 10E3/UL (ref 0–0.7)
EOSINOPHIL NFR BLD AUTO: 2 %
ERYTHROCYTE [DISTWIDTH] IN BLOOD BY AUTOMATED COUNT: 12.5 % (ref 10–15)
HCT VFR BLD AUTO: 37.3 % (ref 40–53)
HGB BLD-MCNC: 13.3 G/DL (ref 13.3–17.7)
LYMPHOCYTES # BLD AUTO: 0.9 10E3/UL (ref 0.8–5.3)
LYMPHOCYTES NFR BLD AUTO: 25 %
MCH RBC QN AUTO: 32 PG (ref 26.5–33)
MCHC RBC AUTO-ENTMCNC: 35.7 G/DL (ref 31.5–36.5)
MCV RBC AUTO: 90 FL (ref 78–100)
MONOCYTES # BLD AUTO: 0.3 10E3/UL (ref 0–1.3)
MONOCYTES NFR BLD AUTO: 8 %
NEUTROPHILS # BLD AUTO: 2.4 10E3/UL (ref 1.6–8.3)
NEUTROPHILS NFR BLD AUTO: 65 %
PLATELET # BLD AUTO: 193 10E3/UL (ref 150–450)
RBC # BLD AUTO: 4.15 10E6/UL (ref 4.4–5.9)
RETICS # AUTO: 0.05 10E6/UL (ref 0.03–0.1)
RETICS/RBC NFR AUTO: 1.1 % (ref 0.5–2)
WBC # BLD AUTO: 3.7 10E3/UL (ref 4–11)

## 2022-09-01 PROCEDURE — 90471 IMMUNIZATION ADMIN: CPT | Performed by: INTERNAL MEDICINE

## 2022-09-01 PROCEDURE — 36415 COLL VENOUS BLD VENIPUNCTURE: CPT | Performed by: INTERNAL MEDICINE

## 2022-09-01 PROCEDURE — 84439 ASSAY OF FREE THYROXINE: CPT | Performed by: INTERNAL MEDICINE

## 2022-09-01 PROCEDURE — 80061 LIPID PANEL: CPT | Performed by: INTERNAL MEDICINE

## 2022-09-01 PROCEDURE — 85025 COMPLETE CBC W/AUTO DIFF WBC: CPT | Performed by: INTERNAL MEDICINE

## 2022-09-01 PROCEDURE — 85060 BLOOD SMEAR INTERPRETATION: CPT | Performed by: PATHOLOGY

## 2022-09-01 PROCEDURE — G0103 PSA SCREENING: HCPCS | Performed by: INTERNAL MEDICINE

## 2022-09-01 PROCEDURE — 99214 OFFICE O/P EST MOD 30 MIN: CPT | Mod: 25 | Performed by: INTERNAL MEDICINE

## 2022-09-01 PROCEDURE — 85045 AUTOMATED RETICULOCYTE COUNT: CPT | Performed by: INTERNAL MEDICINE

## 2022-09-01 PROCEDURE — 99397 PER PM REEVAL EST PAT 65+ YR: CPT | Mod: 25 | Performed by: INTERNAL MEDICINE

## 2022-09-01 PROCEDURE — 84443 ASSAY THYROID STIM HORMONE: CPT | Performed by: INTERNAL MEDICINE

## 2022-09-01 PROCEDURE — 90677 PCV20 VACCINE IM: CPT | Performed by: INTERNAL MEDICINE

## 2022-09-01 SDOH — ECONOMIC STABILITY: FOOD INSECURITY: WITHIN THE PAST 12 MONTHS, YOU WORRIED THAT YOUR FOOD WOULD RUN OUT BEFORE YOU GOT MONEY TO BUY MORE.: PATIENT DECLINED

## 2022-09-01 SDOH — ECONOMIC STABILITY: INCOME INSECURITY: IN THE LAST 12 MONTHS, WAS THERE A TIME WHEN YOU WERE NOT ABLE TO PAY THE MORTGAGE OR RENT ON TIME?: NO

## 2022-09-01 SDOH — ECONOMIC STABILITY: TRANSPORTATION INSECURITY
IN THE PAST 12 MONTHS, HAS LACK OF TRANSPORTATION KEPT YOU FROM MEETINGS, WORK, OR FROM GETTING THINGS NEEDED FOR DAILY LIVING?: NO

## 2022-09-01 SDOH — ECONOMIC STABILITY: FOOD INSECURITY: WITHIN THE PAST 12 MONTHS, THE FOOD YOU BOUGHT JUST DIDN'T LAST AND YOU DIDN'T HAVE MONEY TO GET MORE.: PATIENT DECLINED

## 2022-09-01 SDOH — ECONOMIC STABILITY: TRANSPORTATION INSECURITY
IN THE PAST 12 MONTHS, HAS THE LACK OF TRANSPORTATION KEPT YOU FROM MEDICAL APPOINTMENTS OR FROM GETTING MEDICATIONS?: NO

## 2022-09-01 SDOH — HEALTH STABILITY: PHYSICAL HEALTH: ON AVERAGE, HOW MANY DAYS PER WEEK DO YOU ENGAGE IN MODERATE TO STRENUOUS EXERCISE (LIKE A BRISK WALK)?: 4 DAYS

## 2022-09-01 SDOH — ECONOMIC STABILITY: INCOME INSECURITY: HOW HARD IS IT FOR YOU TO PAY FOR THE VERY BASICS LIKE FOOD, HOUSING, MEDICAL CARE, AND HEATING?: PATIENT DECLINED

## 2022-09-01 SDOH — HEALTH STABILITY: PHYSICAL HEALTH: ON AVERAGE, HOW MANY MINUTES DO YOU ENGAGE IN EXERCISE AT THIS LEVEL?: 30 MIN

## 2022-09-01 ASSESSMENT — LIFESTYLE VARIABLES
SKIP TO QUESTIONS 9-10: 1
AUDIT-C TOTAL SCORE: 0
HOW OFTEN DO YOU HAVE A DRINK CONTAINING ALCOHOL: NEVER
HOW MANY STANDARD DRINKS CONTAINING ALCOHOL DO YOU HAVE ON A TYPICAL DAY: PATIENT DOES NOT DRINK
HOW OFTEN DO YOU HAVE SIX OR MORE DRINKS ON ONE OCCASION: NEVER

## 2022-09-01 ASSESSMENT — ENCOUNTER SYMPTOMS
HEMATURIA: 0
SHORTNESS OF BREATH: 0
DIZZINESS: 0
ARTHRALGIAS: 0
JOINT SWELLING: 0
HEADACHES: 0
CONSTIPATION: 0
HEARTBURN: 0
NERVOUS/ANXIOUS: 0
PALPITATIONS: 0
FREQUENCY: 1
HEMATOCHEZIA: 0
EYE PAIN: 1
MYALGIAS: 0
FEVER: 0
PARESTHESIAS: 1
NAUSEA: 0
DYSURIA: 0
DIARRHEA: 0
COUGH: 0
CHILLS: 0
WEAKNESS: 0
ABDOMINAL PAIN: 0
SORE THROAT: 0

## 2022-09-01 ASSESSMENT — ACTIVITIES OF DAILY LIVING (ADL): CURRENT_FUNCTION: NO ASSISTANCE NEEDED

## 2022-09-01 ASSESSMENT — SOCIAL DETERMINANTS OF HEALTH (SDOH)
DO YOU BELONG TO ANY CLUBS OR ORGANIZATIONS SUCH AS CHURCH GROUPS UNIONS, FRATERNAL OR ATHLETIC GROUPS, OR SCHOOL GROUPS?: NO
IN A TYPICAL WEEK, HOW MANY TIMES DO YOU TALK ON THE PHONE WITH FAMILY, FRIENDS, OR NEIGHBORS?: TWICE A WEEK
HOW OFTEN DO YOU ATTEND CHURCH OR RELIGIOUS SERVICES?: PATIENT DECLINED
HOW OFTEN DO YOU GET TOGETHER WITH FRIENDS OR RELATIVES?: ONCE A WEEK

## 2022-09-01 ASSESSMENT — PAIN SCALES - GENERAL: PAINLEVEL: NO PAIN (0)

## 2022-09-01 NOTE — PROGRESS NOTES
"SUBJECTIVE:   CC: Sadie Olea is an 67 year old male who presents for preventative health visit.     {Patient has been advised of split billing requirements and indicates understanding: Yes  HPI    Today's PHQ-2 Score:   PHQ-2 ( 1999 Pfizer) 9/1/2022   Q1: Little interest or pleasure in doing things -   Q2: Feeling down, depressed or hopeless -   PHQ-2 Score -   PHQ-2 Total Score (12-17 Years)- Positive if 3 or more points; Administer PHQ-A if positive -   Q1: Little interest or pleasure in doing things -   Q2: Feeling down, depressed or hopeless -   PHQ-2 Score Incomplete       Abuse: Current or Past(Physical, Sexual or Emotional)- No  Do you feel safe in your environment? Yes        Social History     Tobacco Use     Smoking status: Never Smoker     Smokeless tobacco: Never Used   Substance Use Topics     Alcohol use: Not Currently         Alcohol Use 7/5/2021   Prescreen: >3 drinks/day or >7 drinks/week? Not Applicable   Prescreen: >3 drinks/day or >7 drinks/week? -       Last PSA:   PSA   Date Value Ref Range Status   07/05/2021 0.58 0 - 4 ug/L Final     Comment:     Assay Method:  Chemiluminescence using Siemens Vista analyzer       Reviewed and updated as needed this visit by clinical staff   Tobacco  Allergies  Meds   Med Hx  Surg Hx  Fam Hx  Soc Hx          Reviewed and updated as needed this visit by Provider                   {HISTORY OPTIONS (Optional):624374}    Review of Systems  {MALE ROS (Optional):736227::\"CONSTITUTIONAL: NEGATIVE for fever, chills, change in weight\",\"INTEGUMENTARY/SKIN: NEGATIVE for worrisome rashes, moles or lesions\",\"EYES: NEGATIVE for vision changes or irritation\",\"ENT: NEGATIVE for ear, mouth and throat problems\",\"RESP: NEGATIVE for significant cough or SOB\",\"CV: NEGATIVE for chest pain, palpitations or peripheral edema\",\"GI: NEGATIVE for nausea, abdominal pain, heartburn, or change in bowel habits\",\" male: negative for dysuria, hematuria, decreased urinary stream, " "erectile dysfunction, urethral discharge\",\"MUSCULOSKELETAL: NEGATIVE for significant arthralgias or myalgia\",\"NEURO: NEGATIVE for weakness, dizziness or paresthesias\",\"PSYCHIATRIC: NEGATIVE for changes in mood or affect\"}    OBJECTIVE:   BP 98/70 (BP Location: Right arm, Patient Position: Left side, Cuff Size: Adult Regular)   Pulse 60   Temp 97.8  F (36.6  C) (Tympanic)   Resp 18   Ht 1.684 m (5' 6.3\")   Wt 71.8 kg (158 lb 6.4 oz)   SpO2 98%   BMI 25.34 kg/m      Physical Exam          ASSESSMENT/PLAN:   {Diag Picklist:784034}    Patient has been advised of split billing requirements and indicates understanding: Yes    COUNSELING:   Reviewed preventive health counseling, as reflected in patient instructions    Estimated body mass index is 25.34 kg/m  as calculated from the following:    Height as of this encounter: 1.684 m (5' 6.3\").    Weight as of this encounter: 71.8 kg (158 lb 6.4 oz).     {Weight Management Plan (ACO) Complete if BMI is abnormal-  Ages 18-64  BMI >24.9.  Age 65+ with BMI <23 or >30 (Optional):351474}    He reports that he has never smoked. He has never used smokeless tobacco.      Counseling Resources:  ATP IV Guidelines  Pooled Cohorts Equation Calculator  FRAX Risk Assessment  ICSI Preventive Guidelines  Dietary Guidelines for Americans, 2010  USDA's MyPlate  ASA Prophylaxis  Lung CA Screening    Yaima Allen MD  [facility]  "

## 2022-09-01 NOTE — PATIENT INSTRUCTIONS
They will call to schedule an appointment for abdominal aortic aneurysm screening ultrasound - usually this is covered by insurance because it is screening, but you can always call to check.    Labs today. Let me know in the future if you want to start a medication to help with urination. We will check some additional labs for anemia and will also do thyroid testing to make sure this is not contributing to weight loss.    Pneumonia vaccine today.     See a dermatologist for a skin exam - everything looks normal to me but with these new changes it's not unreasonable to check everything out.

## 2022-09-01 NOTE — PROGRESS NOTES
"SUBJECTIVE:   Sadie Olea is a 67 year old male who presents for Preventive Visit.    {  Patient has been advised of split billing requirements and indicates understanding: Yes  Are you in the first 12 months of your Medicare coverage?  No    Healthy Habits:     In general, how would you rate your overall health?  Good    Frequency of exercise:  4-5 days/week    Duration of exercise:  15-30 minutes    Do you usually eat at least 4 servings of fruit and vegetables a day, include whole grains    & fiber and avoid regularly eating high fat or \"junk\" foods?  Yes    Taking medications regularly:  Yes    Medication side effects:  Not applicable    Ability to successfully perform activities of daily living:  No assistance needed    Home Safety:  No safety concerns identified    Hearing Impairment:  No hearing concerns    In the past 6 months, have you been bothered by leaking of urine?  No    In general, how would you rate your overall mental or emotional health?  Excellent      PHQ-2 Total Score: 0    Additional concerns today:  No    Do you feel safe in your environment? Yes    Have you ever done Advance Care Planning? (For example, a Health Directive, POLST, or a discussion with a medical provider or your loved ones about your wishes): No, advance care planning information given to patient to review.  Patient declined advance care planning discussion at this time.       Fall risk  Fallen 2 or more times in the past year?: No  Any fall with injury in the past year?: No  Cognitive Screening   1) Repeat 3 items (Leader, Season, Table)    2) Clock draw: NORMAL  3) 3 item recall: Recalls 2 objects   Results: NORMAL clock, 1-2 items recalled: COGNITIVE IMPAIRMENT LESS LIKELY    Mini-CogTM Copyright SOPHIE Marques. Licensed by the author for use in [facility]; reprinted with permission (sher@.[facility]). All rights reserved.      Do you have sleep apnea, excessive snoring or daytime drowsiness?: no    Has more frequent " urination at night. Not sure that he wants to be on medication, only seems bothersome at night.    Wondering about weight loss.    Hepatitis B stable, follows with hepatology for monitoring and management. On tenofovir.     Has noticed some weight loss, not necessarily trying to lose weight. No jitteriness, fevers, night sweats, lumps or bumps. No cough or bloody sputum. No issues with stooling.     Spot on his face that his children are concerned about.      Reviewed and updated as needed this visit by clinical staff   Tobacco  Allergies  Meds   Med Hx  Surg Hx  Fam Hx  Soc Hx          Reviewed and updated as needed this visit by Provider     Meds               Social History     Tobacco Use     Smoking status: Never Smoker     Smokeless tobacco: Never Used   Substance Use Topics     Alcohol use: Not Currently     If you drink alcohol do you typically have >3 drinks per day or >7 drinks per week? No    Alcohol Use 9/1/2022   Prescreen: >3 drinks/day or >7 drinks/week? No   Prescreen: >3 drinks/day or >7 drinks/week? -                Current providers sharing in care for this patient include:   Patient Care Team:  Clinic - [facility] as PCP - Netta Moreno NP as Referring Physician  Nehemias Potts MD as MD (Gastroenterology)  Yaima De Paz MD as Assigned PCP    The following health maintenance items are reviewed in Epic and correct as of today:  Health Maintenance Due   Topic Date Due     ANNUAL REVIEW OF  ORDERS  Never done     Pneumococcal Vaccine: 65+ Years (2 - PCV) 07/19/2019     COVID-19 Vaccine (4 - Booster for Pfizer series) 03/30/2022     FALL RISK ASSESSMENT  Never done     AORTIC ANEURYSM SCREENING (SYSTEM ASSIGNED)  Never done     INFLUENZA VACCINE (1) 09/01/2022     MEDICARE ANNUAL WELLNESS VISIT  08/15/2022     Patient Active Problem List   Diagnosis     Benign prostatic hyperplasia     Bilateral renal cysts     Dermatochalasis of eyelids of both  eyes     Esophageal reflux     Essential hypertension     Urgency of urination     Inflammatory liver disease     Hepatitis B virus infection     Hyperlipidemia     Nonallopathic lesion of lumbar region     Osteoarthrosis     Spasm of muscle     Hepatitis B, chronic (H)     Gallstone pancreatitis     Past Surgical History:   Procedure Laterality Date     COLONOSCOPY       GALLBLADDER SURGERY       LAPAROSCOPIC CHOLECYSTECTOMY WITH CHOLANGIOGRAMS N/A 06/16/2021    Procedure: LAPAROSCOPIC CHOLECYSTECTOMY WITH CHOLANGIOGRAM;  Surgeon: Loulou Camara MD;  Location:  OR       Social History     Tobacco Use     Smoking status: Never Smoker     Smokeless tobacco: Never Used   Substance Use Topics     Alcohol use: Not Currently     Family History   Problem Relation Age of Onset     Glaucoma Father      Diabetes Type 2  Mother      Hepatitis Sister      Hypertension Brother      Macular Degeneration No family hx of                Review of Systems   Constitutional: Negative for chills and fever.   HENT: Negative for congestion, ear pain, hearing loss and sore throat.    Eyes: Positive for pain. Negative for visual disturbance.   Respiratory: Negative for cough and shortness of breath.    Cardiovascular: Negative for chest pain, palpitations and peripheral edema.   Gastrointestinal: Negative for abdominal pain, constipation, diarrhea, heartburn, hematochezia and nausea.   Genitourinary: Positive for frequency. Negative for dysuria, genital sores, hematuria, impotence, penile discharge and urgency.   Musculoskeletal: Negative for arthralgias, joint swelling and myalgias.   Skin: Negative for rash.   Neurological: Positive for paresthesias. Negative for dizziness, weakness and headaches.   Psychiatric/Behavioral: Negative for mood changes. The patient is not nervous/anxious.          OBJECTIVE:   BP 98/70 (BP Location: Right arm, Patient Position: Left side, Cuff Size: Adult Regular)   Pulse 60   Temp 97.8  F (36.6  " C) (Tympanic)   Resp 18   Ht 1.684 m (5' 6.3\")   Wt 71.8 kg (158 lb 6.4 oz)   SpO2 98%   BMI 25.34 kg/m   Estimated body mass index is 25.34 kg/m  as calculated from the following:    Height as of this encounter: 1.684 m (5' 6.3\").    Weight as of this encounter: 71.8 kg (158 lb 6.4 oz).  Physical Exam  GENERAL: healthy, alert and no distress  EYES: Eyes grossly normal to inspection, PERRL and conjunctivae and sclerae normal  HENT: ear canals and TM's normal, nose and mouth without ulcers or lesions  NECK: no adenopathy, no asymmetry, masses, or scars and thyroid normal to palpation  RESP: lungs clear to auscultation - no rales, rhonchi or wheezes  CV: regular rate and rhythm, normal S1 S2, no S3 or S4, no murmur, click or rub, no peripheral edema and peripheral pulses strong  ABDOMEN: soft, nontender, no hepatosplenomegaly, no masses and bowel sounds normal  MS: no gross musculoskeletal defects noted, no edema  SKIN: no suspicious lesions or rashes. Hyperpigmented plaque on face.   NEURO: Normal strength and tone, mentation intact and speech normal  PSYCH: mentation appears normal, affect normal/bright      ASSESSMENT / PLAN:   1. Routine general medical examination at a health care facility  Counseling as below.     2. Screening for AAA (abdominal aortic aneurysm)  Meets criteria for screening.   - Abdominal Aortic Aneurysm Screening/Tracking  - US Abdominal Aorta Limited; Future    3. History of smoking  - US Abdominal Aorta Limited; Future    4. Chronic viral hepatitis B without delta agent and without coma (H)  Follows with hepatology, stable on tenofovir.     5. Weight loss  Reviewed weight curve, which recently appears relatively stable. CBC as below and TSH for work-up.   - TSH with free T4 reflex  - T4 free    6. Anemia, unspecified type  Noted on prior labs, will plan to recheck levels and add on additional blood work today. Colonoscopy is up to date but low threshold to revisit with possible weight " "loss.   - CBC with platelets; Future  - Reticulocyte count; Future  - Lab Blood Morphology Pathologist Review; Future  - Lab Blood Morphology Pathologist Review    7. Screening for prostate cancer  - PSA, screen    8. Benign prostatic hyperplasia with nocturia  Nocturia symptoms consistent with BPH. Will check PSA as well, discussed medication but patient declines at this time.   - PSA, screen    9. Seborrheic keratoses  Appears consistent with SK, but given location, recent changes and if patient desires removal will refer to Derm for further management.   - Adult Dermatology Referral; Future    10. Lipid screening  - Lipid Profile (Chol, Trig, HDL, LDL calc)          COUNSELING:  Reviewed preventive health counseling, as reflected in patient instructions       Consider AAA screening for ages 65-75 and smoking history       Regular exercise       Healthy diet/nutrition       Vision screening       Hearing screening       Bladder control       Colon cancer screening       Prostate cancer screening    Estimated body mass index is 25.34 kg/m  as calculated from the following:    Height as of this encounter: 1.684 m (5' 6.3\").    Weight as of this encounter: 71.8 kg (158 lb 6.4 oz).        He reports that he has never smoked. He has never used smokeless tobacco.      Appropriate preventive services were discussed with this patient, including applicable screening as appropriate for cardiovascular disease, diabetes, osteopenia/osteoporosis, and glaucoma.  As appropriate for age/gender, discussed screening for colorectal cancer, prostate cancer, breast cancer, and cervical cancer. Checklist reviewing preventive services available has been given to the patient.    Reviewed patients plan of care and provided an AVS. The Basic Care Plan (routine screening as documented in Health Maintenance) for Sadie meets the Care Plan requirement. This Care Plan has been established and reviewed with the Patient.    Counseling " Resources:  ATP IV Guidelines  Pooled Cohorts Equation Calculator  Breast Cancer Risk Calculator  Breast Cancer: Medication to Reduce Risk  FRAX Risk Assessment  ICSI Preventive Guidelines  Dietary Guidelines for Americans, 2010  [employer]'s MyPlate  ASA Prophylaxis  Lung CA Screening    Yaima Allen MD  [facility]    Identified Health Risks:

## 2022-09-02 LAB
CHOLEST SERPL-MCNC: 137 MG/DL
FASTING STATUS PATIENT QL REPORTED: ABNORMAL
HDLC SERPL-MCNC: 37 MG/DL
LDLC SERPL CALC-MCNC: 91 MG/DL
NONHDLC SERPL-MCNC: 100 MG/DL
PATH REPORT.COMMENTS IMP SPEC: NORMAL
PATH REPORT.COMMENTS IMP SPEC: NORMAL
PATH REPORT.FINAL DX SPEC: NORMAL
PATH REPORT.MICROSCOPIC SPEC OTHER STN: NORMAL
PATH REPORT.MICROSCOPIC SPEC OTHER STN: NORMAL
PATH REPORT.RELEVANT HX SPEC: NORMAL
PSA SERPL-MCNC: 0.44 UG/L (ref 0–4)
T4 FREE SERPL-MCNC: 1.01 NG/DL (ref 0.76–1.46)
TRIGL SERPL-MCNC: 45 MG/DL
TSH SERPL DL<=0.005 MIU/L-ACNC: 4.12 MU/L (ref 0.4–4)

## 2022-11-21 ENCOUNTER — HEALTH MAINTENANCE LETTER (OUTPATIENT)
Age: 67
End: 2022-11-21

## 2022-11-23 DIAGNOSIS — B18.1 CHRONIC VIRAL HEPATITIS B WITHOUT DELTA AGENT AND WITHOUT COMA (H): Primary | ICD-10-CM

## 2023-02-27 DIAGNOSIS — B18.1 CHRONIC VIRAL HEPATITIS B WITHOUT DELTA AGENT AND WITHOUT COMA (H): ICD-10-CM

## 2023-02-27 RX ORDER — TENOFOVIR DISOPROXIL FUMARATE 300 MG/1
300 TABLET, FILM COATED ORAL DAILY
Qty: 30 TABLET | Refills: 3 | Status: SHIPPED | OUTPATIENT
Start: 2023-02-27 | End: 2023-06-21

## 2023-05-01 ENCOUNTER — TELEPHONE (OUTPATIENT)
Dept: GASTROENTEROLOGY | Facility: CLINIC | Age: 68
End: 2023-05-01
Payer: COMMERCIAL

## 2023-05-01 NOTE — TELEPHONE ENCOUNTER
Labs and ultrasound order faxed to [phone].    ROXIE KimN, RN, PHN  Hepatology Clinic  [address] [place] [address]

## 2023-05-01 NOTE — TELEPHONE ENCOUNTER
[facility] Call Center    Phone Message    May a detailed message be left on voicemail: yes     Reason for Call: Other: Pt would like lab + US orders sent to [facility] as the pt works there      CB#: [phone]    Fax # [phone]    Action Taken: Message routed to:  [facility] ([facility] Hep.     Travel Screening: Not Applicable

## 2023-05-18 ENCOUNTER — TELEPHONE (OUTPATIENT)
Dept: GASTROENTEROLOGY | Facility: CLINIC | Age: 68
End: 2023-05-18
Payer: COMMERCIAL

## 2023-05-18 NOTE — TELEPHONE ENCOUNTER
[facility] Call Center    Phone Message    May a detailed message be left on voicemail: yes     Reason for Call: Other: Needs ultrasound orders faxed to [facility]     Action Taken: Message routed to:  [facility] ([facility]): hep    Travel Screening: Not Applicable

## 2023-05-18 NOTE — TELEPHONE ENCOUNTER
Faxed ultrasound order to [phone] and [phone], per patient request.    ROXIE KimN, RN, PHN  Hepatology Clinic  [address] [place] [address]

## 2023-05-23 ENCOUNTER — TELEPHONE (OUTPATIENT)
Dept: GASTROENTEROLOGY | Facility: CLINIC | Age: 68
End: 2023-05-23
Payer: COMMERCIAL

## 2023-05-23 NOTE — TELEPHONE ENCOUNTER
[facility] Call Center    Phone Message    May a detailed message be left on voicemail: yes     Reason for Call: Other: Pt's daughter is calling in asking to get a message to the Pt's care team. She states that they have spoken with [facility] and were told that they did not receive the orders that were sent for an US. Please respond accordingly.     Action Taken: Message routed to:  [facility] ([facility] Hep    Travel Screening: Not Applicable

## 2023-05-23 NOTE — TELEPHONE ENCOUNTER
Ultrasound orders faxed to [facility] Fax # @ [phone]. Patient confirmation sent via [employer].    ROXIE KimN, RN, PHN  Hepatology Clinic  [address] [place] [address]

## 2023-06-20 DIAGNOSIS — B18.1 CHRONIC VIRAL HEPATITIS B WITHOUT DELTA AGENT AND WITHOUT COMA (H): ICD-10-CM

## 2023-06-20 RX ORDER — TENOFOVIR DISOPROXIL FUMARATE 300 MG/1
300 TABLET, FILM COATED ORAL DAILY
Qty: 30 TABLET | Refills: 1 | Status: CANCELLED | OUTPATIENT
Start: 2023-06-20

## 2023-06-21 ENCOUNTER — VIRTUAL VISIT (OUTPATIENT)
Dept: GASTROENTEROLOGY | Facility: CLINIC | Age: 68
End: 2023-06-21
Attending: PHYSICIAN ASSISTANT
Payer: COMMERCIAL

## 2023-06-21 DIAGNOSIS — B18.1 CHRONIC VIRAL HEPATITIS B WITHOUT DELTA AGENT AND WITHOUT COMA (H): ICD-10-CM

## 2023-06-21 PROCEDURE — 99214 OFFICE O/P EST MOD 30 MIN: CPT | Mod: VID | Performed by: PHYSICIAN ASSISTANT

## 2023-06-21 RX ORDER — TENOFOVIR DISOPROXIL FUMARATE 300 MG/1
300 TABLET, FILM COATED ORAL DAILY
Qty: 30 TABLET | Refills: 11 | Status: SHIPPED | OUTPATIENT
Start: 2023-06-21 | End: 2024-06-24

## 2023-06-21 NOTE — NURSING NOTE
Is the patient currently in the state of [place]? YES    Visit mode:VIDEO    If the visit is dropped, the patient can be reconnected by: VIDEO VISIT: Text to cell phone: [phone]    Will anyone else be joining the visit? NO      How would you like to obtain your AVS? MyChart    Are changes needed to the allergy or medication list? NO    Reason for visit: RECHECK

## 2023-06-21 NOTE — Clinical Note
Can you please fax labs and ultrasound to Royce so he can do labs and ultrasound in six months.  Okay to send A2B message when that has been done.  Thanks, Seth

## 2023-06-21 NOTE — LETTER
6/21/2023         RE: Sadie Olea  [address]  [place] [address]        Dear Colleague,    Thank you for referring your patient, Sadie Olea, to the [facility]PATOLOG[facility] [place]. Please see a copy of my visit note below.    Virtual Visit Details    Type of service:  Video Visit     Originating Location (pt. Location):Home  Distant Location (provider location):  Off-site  Platform used for Video Visit: [facility]       Hepatology Clinic note  Sadie Olea   Date of Birth 1955  Date of Service 6/21/2023         Assessment/plan:   Sadie Olea is a 67 year old male with history of chronic hepatitis B, e antigen positive and e antibody negative who has been maintained on Tenofovir DF for active disease. Renal labs will be continued to monitor for changes during treatment. Recent HBV DNA remains low. Transaminases . FibroScan in 2017 showing Stage 0-1 Fibrosis, 5.4 kilopascals. Patient is up to date with HCC screening.     # Chronic Hepatitis B:   - Continue tenofovir  mg daily   - BMP, Hepatic panel, CBC, INR, AFP and HBV DNA in six months     # HCC screening: US abdomen due in November     # Follow-up in clinic in one year     Seth Nguyen PA-C   [facility] Hepatology clinic    -----------------------------------------------------       HPI:   Sadie Olea is a 67 year old male  who presents to clinic today for the following health issues:     Hepatitis B  E antigen positive (8/22/2018)  E antibody negative 98/22/2018)   -Diagnosed: 2009  -History: ? Jaundice at age 42   -MRE 2011: normal fibrosis   Fibrosis scan: 8/26/2019: F0-F1, 5.9 kilopascals  -Prior treatments:   Started on tenofovir DF in 2015 due to 10 year HCC risk   Changed to tenofovir AF in May 2019  Changed back to tenofovir DF due to insurance    Patient was last seen by me 12/20/2021. No recent hospitalizations or ER visits. No new medications.     Appetite is good. Weight seems to be stable. Take  tenofovir DF with good consistently. No problems getting medication from pharmacy.     Outside labs showing  international unit(s)/mL and ALT 21 and AST 28.     Patient denies jaundice, lower extremity edema, abdominal distension or confusion.  Patient also denies melena, hematochezia or hematemesis. Patient denies weight loss, fevers, sweats or chills.    He does not drink alcohol.     Hepatitis B:   Lab Results   Component Value Date    HEPBANG Positive, confirmed (A) 12/19/2011    AUSAB Negative 12/19/2011     Lab Results   Component Value Date    HBQRESINST 114 (H) 06/06/2022    HBQRESINST 214 (H) 12/10/2021    HBQRES 95 (A) 06/08/2021    HBQRES 208 (A) 12/29/2020    HBQRES 217 (A) 07/14/2020    HBQRES 313 (A) 01/28/2020    HBQRES 1,735 (A) 07/01/2019     Lab Results   Component Value Date    HBEABY Negative 08/22/2018    HBEAGN Positive (A) 08/22/2018       Recent Labs   Lab Test 06/06/22  1819 12/10/21  1702 06/08/21  1650 02/18/21  1710 02/16/21  0154 12/29/20  1515 07/14/20  1531 01/28/20  1548 07/01/19  1031 02/15/19  1750   ALKPHOS 59 61 51 80 78 58 55 52 51 46   ALT 28 31 25 105* 119* 24 32 25 31 52   AST 21 20 17 41 219* 17 23 19 23 33        Medical hx Surgical hx   Past Medical History:   Diagnosis Date     FHx: cholecystectomy      GERD (gastroesophageal reflux disease)      HBV (hepatitis B virus) infection      Hyperlipidemia     Past Surgical History:   Procedure Laterality Date     COLONOSCOPY       GALLBLADDER SURGERY       LAPAROSCOPIC CHOLECYSTECTOMY WITH CHOLANGIOGRAMS N/A 06/16/2021    Procedure: LAPAROSCOPIC CHOLECYSTECTOMY WITH CHOLANGIOGRAM;  Surgeon: Loulou Camara MD;  Location:  OR                 Physical Exam:   GENERAL: healthy, alert and no distress  EYES: Eyes grossly normal to inspection, conjunctivae and sclerae normal  RESP: no audible wheeze, cough, or visible cyanosis.  No visible retractions or increased work of breathing.  Able to speak fully in complete  sentences  NEURO: Cranial nerves grossly intact, mentation intact and speech normal  PSYCH: mentation appears normal, affect normal/bright, judgement and insight intact, normal speech and appearance well-groomed           Data:   Reviewed in person and significant for:    Lab Results   Component Value Date     06/06/2022     06/08/2021      Lab Results   Component Value Date    POTASSIUM 3.7 06/06/2022    POTASSIUM 3.5 06/08/2021     Lab Results   Component Value Date    CHLORIDE 106 06/06/2022    CHLORIDE 107 06/08/2021     Lab Results   Component Value Date    CO2 26 06/06/2022    CO2 28 06/08/2021     Lab Results   Component Value Date    BUN 17 06/06/2022    BUN 18 06/08/2021     Lab Results   Component Value Date    CR 0.95 06/06/2022    CR 0.97 06/08/2021       Lab Results   Component Value Date    WBC 3.7 09/01/2022    WBC 4.7 06/08/2021     Lab Results   Component Value Date    HGB 13.3 09/01/2022    HGB 12.3 06/08/2021     Lab Results   Component Value Date    HCT 37.3 09/01/2022    HCT 35.6 06/08/2021     Lab Results   Component Value Date    MCV 90 09/01/2022    MCV 93 06/08/2021     Lab Results   Component Value Date     09/01/2022     06/08/2021       Lab Results   Component Value Date    AST 21 06/06/2022    AST 17 06/08/2021     Lab Results   Component Value Date    ALT 28 06/06/2022    ALT 25 06/08/2021     No results found for: BILICONJ   Lab Results   Component Value Date    BILITOTAL 0.6 06/06/2022    BILITOTAL 0.8 06/08/2021       Lab Results   Component Value Date    ALBUMIN 3.9 06/06/2022    ALBUMIN 3.7 06/08/2021     Lab Results   Component Value Date    PROTTOTAL 7.4 06/06/2022    PROTTOTAL 7.1 06/08/2021      Lab Results   Component Value Date    ALKPHOS 59 06/06/2022    ALKPHOS 51 06/08/2021       Lab Results   Component Value Date    INR 1.10 06/06/2022    INR 1.15 06/08/2021       No results found.    EXAM: US ABDOMEN COMPLETE   LOCATION: [facility] MEDICAL IMAGING    DATE/TIME: 5/30/2023 11:00 AM CDT     INDICATION: Chronic Type B Viral Hepatitis (HC).   COMPARISON: MRI abdomen 07/27/2022.   TECHNIQUE: Complete abdominal ultrasound.     FINDINGS:     GALLBLADDER: Cholecystectomy.     BILE DUCTS: Common bile duct is dilated at 11 mm. This can be seen in patients postcholecystectomy and is unchanged from the MRI.     LIVER: Normal parenchyma with smooth contour. No focal mass.     RIGHT KIDNEY: Normal size. Normal echogenicity with no hydronephrosis or mass. 8 mm cyst right kidney.     LEFT KIDNEY: Normal size. Normal echogenicity with no hydronephrosis or mass. Two benign cysts left kidney, the largest measuring 2.4 cm.     SPLEEN: Normal.     PANCREAS: The visualized portions are normal.     AORTA: Normal in caliber.     IVC: Normal where visualized.     No ascites.        Again, thank you for allowing me to participate in the care of your patient.        Sincerely,        Seth Nguyen PA-C

## 2023-06-21 NOTE — PROGRESS NOTES
Virtual Visit Details    Type of service:  Video Visit     Originating Location (pt. Location):Home  Distant Location (provider location):  Off-site  Platform used for Video Visit: [facility]       Hepatology Clinic note  Sadie Olea   Date of Birth 1955  Date of Service 6/21/2023         Assessment/plan:   Sadie Olea is a 67 year old male with history of chronic hepatitis B, e antigen positive and e antibody negative who has been maintained on Tenofovir DF for active disease. Renal labs will be continued to monitor for changes during treatment. Recent HBV DNA remains low. Transaminases . FibroScan in 2017 showing Stage 0-1 Fibrosis, 5.4 kilopascals. Patient is up to date with HCC screening.     # Chronic Hepatitis B:   - Continue tenofovir  mg daily   - BMP, Hepatic panel, CBC, INR, AFP and HBV DNA in six months     # HCC screening: US abdomen due in November     # Follow-up in clinic in one year     Seth Nguyen PA-C   [facility] Hepatology clinic    -----------------------------------------------------       HPI:   Sadie Olea is a 67 year old male  who presents to clinic today for the following health issues:     Hepatitis B  E antigen positive (8/22/2018)  E antibody negative 98/22/2018)   -Diagnosed: 2009  -History: ? Jaundice at age 42   -MRE 2011: normal fibrosis   Fibrosis scan: 8/26/2019: F0-F1, 5.9 kilopascals  -Prior treatments:   Started on tenofovir DF in 2015 due to 10 year HCC risk   Changed to tenofovir AF in May 2019  Changed back to tenofovir DF due to insurance    Patient was last seen by me 12/20/2021. No recent hospitalizations or ER visits. No new medications.     Appetite is good. Weight seems to be stable. Take tenofovir DF with good consistently. No problems getting medication from pharmacy.     Outside labs showing  international unit(s)/mL and ALT 21 and AST 28.     Patient denies jaundice, lower extremity edema, abdominal distension or confusion.   Patient also denies melena, hematochezia or hematemesis. Patient denies weight loss, fevers, sweats or chills.    He does not drink alcohol.     Hepatitis B:   Lab Results   Component Value Date    HEPBANG Positive, confirmed (A) 12/19/2011    AUSAB Negative 12/19/2011     Lab Results   Component Value Date    HBQRESINST 114 (H) 06/06/2022    HBQRESINST 214 (H) 12/10/2021    HBQRES 95 (A) 06/08/2021    HBQRES 208 (A) 12/29/2020    HBQRES 217 (A) 07/14/2020    HBQRES 313 (A) 01/28/2020    HBQRES 1,735 (A) 07/01/2019     Lab Results   Component Value Date    HBEABY Negative 08/22/2018    HBEAGN Positive (A) 08/22/2018       Recent Labs   Lab Test 06/06/22  1819 12/10/21  1702 06/08/21  1650 02/18/21  1710 02/16/21  0154 12/29/20  1515 07/14/20  1531 01/28/20  1548 07/01/19  1031 02/15/19  1750   ALKPHOS 59 61 51 80 78 58 55 52 51 46   ALT 28 31 25 105* 119* 24 32 25 31 52   AST 21 20 17 41 219* 17 23 19 23 33        Medical hx Surgical hx   Past Medical History:   Diagnosis Date     FHx: cholecystectomy      GERD (gastroesophageal reflux disease)      HBV (hepatitis B virus) infection      Hyperlipidemia     Past Surgical History:   Procedure Laterality Date     COLONOSCOPY       GALLBLADDER SURGERY       LAPAROSCOPIC CHOLECYSTECTOMY WITH CHOLANGIOGRAMS N/A 06/16/2021    Procedure: LAPAROSCOPIC CHOLECYSTECTOMY WITH CHOLANGIOGRAM;  Surgeon: Loulou Camara MD;  Location:  OR                 Physical Exam:   GENERAL: healthy, alert and no distress  EYES: Eyes grossly normal to inspection, conjunctivae and sclerae normal  RESP: no audible wheeze, cough, or visible cyanosis.  No visible retractions or increased work of breathing.  Able to speak fully in complete sentences  NEURO: Cranial nerves grossly intact, mentation intact and speech normal  PSYCH: mentation appears normal, affect normal/bright, judgement and insight intact, normal speech and appearance well-groomed           Data:   Reviewed in person and  significant for:    Lab Results   Component Value Date     06/06/2022     06/08/2021      Lab Results   Component Value Date    POTASSIUM 3.7 06/06/2022    POTASSIUM 3.5 06/08/2021     Lab Results   Component Value Date    CHLORIDE 106 06/06/2022    CHLORIDE 107 06/08/2021     Lab Results   Component Value Date    CO2 26 06/06/2022    CO2 28 06/08/2021     Lab Results   Component Value Date    BUN 17 06/06/2022    BUN 18 06/08/2021     Lab Results   Component Value Date    CR 0.95 06/06/2022    CR 0.97 06/08/2021       Lab Results   Component Value Date    WBC 3.7 09/01/2022    WBC 4.7 06/08/2021     Lab Results   Component Value Date    HGB 13.3 09/01/2022    HGB 12.3 06/08/2021     Lab Results   Component Value Date    HCT 37.3 09/01/2022    HCT 35.6 06/08/2021     Lab Results   Component Value Date    MCV 90 09/01/2022    MCV 93 06/08/2021     Lab Results   Component Value Date     09/01/2022     06/08/2021       Lab Results   Component Value Date    AST 21 06/06/2022    AST 17 06/08/2021     Lab Results   Component Value Date    ALT 28 06/06/2022    ALT 25 06/08/2021     No results found for: BILICONJ   Lab Results   Component Value Date    BILITOTAL 0.6 06/06/2022    BILITOTAL 0.8 06/08/2021       Lab Results   Component Value Date    ALBUMIN 3.9 06/06/2022    ALBUMIN 3.7 06/08/2021     Lab Results   Component Value Date    PROTTOTAL 7.4 06/06/2022    PROTTOTAL 7.1 06/08/2021      Lab Results   Component Value Date    ALKPHOS 59 06/06/2022    ALKPHOS 51 06/08/2021       Lab Results   Component Value Date    INR 1.10 06/06/2022    INR 1.15 06/08/2021       No results found.    EXAM: US ABDOMEN COMPLETE   LOCATION: [facility] MEDICAL IMAGING   DATE/TIME: 5/30/2023 11:00 AM CDT     INDICATION: Chronic Type B Viral Hepatitis (HC).   COMPARISON: MRI abdomen 07/27/2022.   TECHNIQUE: Complete abdominal ultrasound.     FINDINGS:     GALLBLADDER: Cholecystectomy.     BILE DUCTS: Common bile duct is  dilated at 11 mm. This can be seen in patients postcholecystectomy and is unchanged from the MRI.     LIVER: Normal parenchyma with smooth contour. No focal mass.     RIGHT KIDNEY: Normal size. Normal echogenicity with no hydronephrosis or mass. 8 mm cyst right kidney.     LEFT KIDNEY: Normal size. Normal echogenicity with no hydronephrosis or mass. Two benign cysts left kidney, the largest measuring 2.4 cm.     SPLEEN: Normal.     PANCREAS: The visualized portions are normal.     AORTA: Normal in caliber.     IVC: Normal where visualized.     No ascites.

## 2023-11-25 ENCOUNTER — HEALTH MAINTENANCE LETTER (OUTPATIENT)
Age: 68
End: 2023-11-25

## 2023-12-29 ENCOUNTER — TELEPHONE (OUTPATIENT)
Dept: GASTROENTEROLOGY | Facility: CLINIC | Age: 68
End: 2023-12-29
Payer: COMMERCIAL

## 2023-12-29 NOTE — TELEPHONE ENCOUNTER
Labs faxed again to [facility] lab. Patient notified via [employer] message.    KITTY Kim, RN, PHN  Hepatology Clinic  [facility]  [facility]

## 2023-12-29 NOTE — TELEPHONE ENCOUNTER
[facility] Call Center    Phone Message    May a detailed message be left on voicemail: yes     Reason for Call: Other: Pt requesting lab order be sent to [facility]. Pt states they can not see order for labs.      Action Taken: Other: hepa     Travel Screening: Not Applicable

## 2024-05-29 ENCOUNTER — MYC MEDICAL ADVICE (OUTPATIENT)
Dept: GASTROENTEROLOGY | Facility: CLINIC | Age: 69
End: 2024-05-29
Payer: COMMERCIAL

## 2024-05-30 DIAGNOSIS — B18.1 HEPATITIS B, CHRONIC (H): Primary | ICD-10-CM

## 2024-06-11 ENCOUNTER — TELEPHONE (OUTPATIENT)
Dept: GASTROENTEROLOGY | Facility: CLINIC | Age: 69
End: 2024-06-11
Payer: COMMERCIAL

## 2024-06-11 NOTE — TELEPHONE ENCOUNTER
Labs faxed to [facility] with fax # provided by patient. Communicated to patient via [employer].    ROXIE KimN, RN, PHN  Hepatology Clinic  [facility]  [facility]

## 2024-06-11 NOTE — TELEPHONE ENCOUNTER
[facility] Call Center    Phone Message    May a detailed message be left on voicemail: yes     Reason for Call: Order(s): Other: Pt is requesting lab order to [facility].  Reason for requested: Fax: [phone]  Date needed: ASA  Provider name: Seth Nguyen PA-C    Action Taken: Message routed to:  [facility] ([facility] Hep.    Travel Screening: Not Applicable

## 2024-06-24 ENCOUNTER — MYC MEDICAL ADVICE (OUTPATIENT)
Dept: GASTROENTEROLOGY | Facility: CLINIC | Age: 69
End: 2024-06-24

## 2024-06-24 ENCOUNTER — OFFICE VISIT (OUTPATIENT)
Dept: GASTROENTEROLOGY | Facility: CLINIC | Age: 69
End: 2024-06-24
Attending: PHYSICIAN ASSISTANT
Payer: COMMERCIAL

## 2024-06-24 VITALS
DIASTOLIC BLOOD PRESSURE: 84 MMHG | HEART RATE: 57 BPM | BODY MASS INDEX: 23.67 KG/M2 | HEIGHT: 71 IN | SYSTOLIC BLOOD PRESSURE: 138 MMHG | WEIGHT: 169.1 LBS

## 2024-06-24 DIAGNOSIS — B18.1 CHRONIC VIRAL HEPATITIS B WITHOUT DELTA AGENT AND WITHOUT COMA (H): Primary | ICD-10-CM

## 2024-06-24 PROCEDURE — 99213 OFFICE O/P EST LOW 20 MIN: CPT | Performed by: PHYSICIAN ASSISTANT

## 2024-06-24 PROCEDURE — 99214 OFFICE O/P EST MOD 30 MIN: CPT | Performed by: PHYSICIAN ASSISTANT

## 2024-06-24 RX ORDER — TENOFOVIR DISOPROXIL FUMARATE 300 MG/1
300 TABLET, FILM COATED ORAL DAILY
Qty: 30 TABLET | Refills: 11 | Status: SHIPPED | OUTPATIENT
Start: 2024-06-24

## 2024-06-24 ASSESSMENT — PAIN SCALES - GENERAL: PAINLEVEL: NO PAIN (0)

## 2024-06-24 NOTE — LETTER
6/24/2024      Sadie Olea  [address] [place] [address]      Dear Colleague,    Thank you for referring your patient, Sadie Olea, to the [facility] HEPATOLOG[facility] CLIN[facility] [place]. Please see a copy of my visit note below.    Hepatology Clinic note  Sadie Olea   Date of Birth 1955         Assessment/plan:   Sadie Olea is a 68 year old male with chronic hepatitis B, e antigen positive and e antibody negative who has been maintained on tenofovir disoproxil/Viread 300 mg daily  for active disease.    HBV DNA:  low HBV DNA viremia   ALT/AST: normal   Fibrosis Assessment: Fibrosis scan: 8/26/2019: F0-F1, 5.9 kilopascals  Liver Function: Low normal platelets    # Chronic Hepatitis B, suppressed:   - Continue tenofovir disoproxil/Viread 300 mg daily   - Renal labs will be continued to monitor for changes during treatment.  - BMP, Hepatic panel, CBC, INR, AFP and HBV DNA in six months     # HCC screening, 1.3 cm lesion noted on ultrasound within the last year which is new (stable in size)  - Recommend further characterization of lesion with MRI w/ contrast within the next month. Will fax to orders to [facility].     # Creatinine trending up:   # Blood pressure readings  - Check BP at home  - Recommend routine follow up with PCP for optimization as needed    # Follow-up in clinic in one year     Seth Nguyen PA-C   [facility] Hepatology clinic    -----------------------------------------------------       HPI:   Sadie Olea is a 68 year old male who presents to clinic today for the following health issues:    Hepatitis B  E antigen positive (8/22/2018)  E antibody negative 98/22/2018)   -Diagnosed: 2009  -History: ? Jaundice at age 42   -MRE 2011: normal fibrosis   Fibrosis scan: 8/26/2019: F0-F1, 5.9 kilopascals  -Prior treatments:   Started on tenofovir DF in 2015 due to 10 year HCC risk   Changed to tenofovir AF in May 2019  Changed back to tenofovir DF due to insurance    Patient  "was last seen by me on 6/21/2023.No recent hospitalizations or ER visits. No new medications.     Takes Hep B medication without problems.   Appetite is good. Weight is stable. Had swelling in legs for about 20 days after trip to [place]. Having bloating in abdomen in the evening. Regular bowel movements.     Patient denies jaundice, lower extremity edema, abdominal distension or confusion.    Patient also denies melena, hematochezia or hematemesis.  Patient denies weight loss, fevers, sweats or chills.    No alcohol.     Hepatitis B:   Lab Results   Component Value Date    HEPBANG Positive, confirmed (A) 12/19/2011    AUSAB Negative 12/19/2011     Lab Results   Component Value Date    HBQRESINST 114 (H) 06/06/2022    HBQRESINST 214 (H) 12/10/2021    HBQRES 95 (A) 06/08/2021    HBQRES 208 (A) 12/29/2020    HBQRES 217 (A) 07/14/2020    HBQRES 313 (A) 01/28/2020    HBQRES 1,735 (A) 07/01/2019     Lab Results   Component Value Date    HBEABY Negative 08/22/2018    HBEAGN Positive (A) 08/22/2018       Recent Labs   Lab Test 06/06/22  1819 12/10/21  1702 06/08/21  1650 02/18/21  1710 02/16/21  0154 12/29/20  1515 07/14/20  1531 01/28/20  1548 07/01/19  1031 02/15/19  1750   ALKPHOS 59 61 51 80 78 58 55 52 51 46   ALT 28 31 25 105* 119* 24 32 25 31 52   AST 21 20 17 41 219* 17 23 19 23 33        Medical hx Surgical hx   Past Medical History:   Diagnosis Date     FHx: cholecystectomy      GERD (gastroesophageal reflux disease)      HBV (hepatitis B virus) infection      Hyperlipidemia     Past Surgical History:   Procedure Laterality Date     COLONOSCOPY       GALLBLADDER SURGERY       LAPAROSCOPIC CHOLECYSTECTOMY WITH CHOLANGIOGRAMS N/A 06/16/2021    Procedure: LAPAROSCOPIC CHOLECYSTECTOMY WITH CHOLANGIOGRAM;  Surgeon: Loulou Camara MD;  Location:  OR               Physical Exam:   /84   Pulse 57   Ht 1.803 m (5' 11\")   Wt 76.7 kg (169 lb 1.6 oz)   BMI 23.58 kg/m      Gen- well, NAD, A+Ox3, normal " "color  Lym- no palpable LAD  Abd- soft, nontender.   Extr- hands normal, no HECTOR  Skin- no rash or jaundice  Neuro- no asterixis  Psych- normal mood         Data:   Reviewed in person and significant for:    Lab Results   Component Value Date     06/06/2022     06/08/2021      Lab Results   Component Value Date    POTASSIUM 3.7 06/06/2022    POTASSIUM 3.5 06/08/2021     Lab Results   Component Value Date    CHLORIDE 106 06/06/2022    CHLORIDE 107 06/08/2021     Lab Results   Component Value Date    CO2 26 06/06/2022    CO2 28 06/08/2021     Lab Results   Component Value Date    BUN 17 06/06/2022    BUN 18 06/08/2021     Lab Results   Component Value Date    CR 0.95 06/06/2022    CR 0.97 06/08/2021       Lab Results   Component Value Date    WBC 3.7 09/01/2022    WBC 4.7 06/08/2021     Lab Results   Component Value Date    HGB 13.3 09/01/2022    HGB 12.3 06/08/2021     Lab Results   Component Value Date    HCT 37.3 09/01/2022    HCT 35.6 06/08/2021     Lab Results   Component Value Date    MCV 90 09/01/2022    MCV 93 06/08/2021     Lab Results   Component Value Date     09/01/2022     06/08/2021       Lab Results   Component Value Date    AST 21 06/06/2022    AST 17 06/08/2021     Lab Results   Component Value Date    ALT 28 06/06/2022    ALT 25 06/08/2021     No results found for: \"BILICONJ\"   Lab Results   Component Value Date    BILITOTAL 0.6 06/06/2022    BILITOTAL 0.8 06/08/2021       Lab Results   Component Value Date    ALBUMIN 3.9 06/06/2022    ALBUMIN 3.7 06/08/2021     Lab Results   Component Value Date    PROTTOTAL 7.4 06/06/2022    PROTTOTAL 7.1 06/08/2021      Lab Results   Component Value Date    ALKPHOS 59 06/06/2022    ALKPHOS 51 06/08/2021       Lab Results   Component Value Date    INR 1.10 06/06/2022    INR 1.15 06/08/2021       EXAM: US ABDOMEN LIMITED   LOCATION: [facility] MEDICAL IMAGING   DATE: 6/13/2024     INDICATION: Chronic Viral Hepatitis B Without Delta-agent (hc) "   COMPARISON: 12/18/2023   TECHNIQUE: Limited abdominal ultrasound.     FINDINGS:     GALLBLADDER: Surgically removed.     BILE DUCTS: No intrahepatic biliary ductal dilatation. Common duct mildly enlarged may be related to postcholecystectomy state, The common duct measures 11 mm.     LIVER: Normal parenchyma with smooth contour. There is a 11 mm x 13 mm x 8 mm peripheral right lobe mildly hyperechoic lesion without color flow. Which remain stable and likely represents a mildly atypical hemangioma versus fatty infiltration     RIGHT KIDNEY: No hydronephrosis. Simple cyst requires no follow-up.     PANCREAS: The visualized portions are normal.     No ascites.       Again, thank you for allowing me to participate in the care of your patient.        Sincerely,        Seth Nguyen PA-C

## 2024-06-24 NOTE — NURSING NOTE
Standing lab orders due Dec 2024 and June 2025 will be faxed to [facility] closer to due date to avoid issues with scheduling too early.     MRI order faxed to [facility] imaging. Patient notified via [employer].    ROXIE KimN, RN, PHN  Hepatology Clinic  [facility]  [facility]

## 2024-06-24 NOTE — PROGRESS NOTES
Hepatology Clinic note  Sadie Olea   Date of Birth 1955         Assessment/plan:   Sadie Olea is a 68 year old male with chronic hepatitis B, e antigen positive and e antibody negative who has been maintained on tenofovir disoproxil/Viread 300 mg daily  for active disease.    HBV DNA:  low HBV DNA viremia   ALT/AST: normal   Fibrosis Assessment: Fibrosis scan: 8/26/2019: F0-F1, 5.9 kilopascals  Liver Function: Low normal platelets    # Chronic Hepatitis B, suppressed:   - Continue tenofovir disoproxil/Viread 300 mg daily   - Renal labs will be continued to monitor for changes during treatment.  - BMP, Hepatic panel, CBC, INR, AFP and HBV DNA in six months     # HCC screening, 1.3 cm lesion noted on ultrasound within the last year which is new (stable in size)  - Recommend further characterization of lesion with MRI w/ contrast within the next month. Will fax to orders to [facility].     # Creatinine trending up:   # Blood pressure readings  - Check BP at home  - Recommend routine follow up with PCP for optimization as needed    # Follow-up in clinic in one year     Seth Nguyen PA-C   [facility]    -----------------------------------------------------       HPI:   Sadie Olea is a 68 year old male who presents to clinic today for the following health issues:    Hepatitis B  E antigen positive (8/22/2018)  E antibody negative 98/22/2018)   -Diagnosed: 2009  -History: ? Jaundice at age 42   -MRE 2011: normal fibrosis   Fibrosis scan: 8/26/2019: F0-F1, 5.9 kilopascals  -Prior treatments:   Started on tenofovir DF in 2015 due to 10 year HCC risk   Changed to tenofovir AF in May 2019  Changed back to tenofovir DF due to insurance    Patient was last seen by me on 6/21/2023.No recent hospitalizations or ER visits. No new medications.     Takes Hep B medication without problems.   Appetite is good. Weight is stable. Had swelling in legs for about 20 days after trip to [place]. Having  "bloating in abdomen in the evening. Regular bowel movements.     Patient denies jaundice, lower extremity edema, abdominal distension or confusion.    Patient also denies melena, hematochezia or hematemesis.  Patient denies weight loss, fevers, sweats or chills.    No alcohol.     Hepatitis B:   Lab Results   Component Value Date    HEPBANG Positive, confirmed (A) 12/19/2011    AUSAB Negative 12/19/2011     Lab Results   Component Value Date    HBQRESINST 114 (H) 06/06/2022    HBQRESINST 214 (H) 12/10/2021    HBQRES 95 (A) 06/08/2021    HBQRES 208 (A) 12/29/2020    HBQRES 217 (A) 07/14/2020    HBQRES 313 (A) 01/28/2020    HBQRES 1,735 (A) 07/01/2019     Lab Results   Component Value Date    HBEABY Negative 08/22/2018    HBEAGN Positive (A) 08/22/2018       Recent Labs   Lab Test 06/06/22  1819 12/10/21  1702 06/08/21  1650 02/18/21  1710 02/16/21  0154 12/29/20  1515 07/14/20  1531 01/28/20  1548 07/01/19  1031 02/15/19  1750   ALKPHOS 59 61 51 80 78 58 55 52 51 46   ALT 28 31 25 105* 119* 24 32 25 31 52   AST 21 20 17 41 219* 17 23 19 23 33        Medical hx Surgical hx   Past Medical History:   Diagnosis Date    FHx: cholecystectomy     GERD (gastroesophageal reflux disease)     HBV (hepatitis B virus) infection     Hyperlipidemia     Past Surgical History:   Procedure Laterality Date    COLONOSCOPY      GALLBLADDER SURGERY      LAPAROSCOPIC CHOLECYSTECTOMY WITH CHOLANGIOGRAMS N/A 06/16/2021    Procedure: LAPAROSCOPIC CHOLECYSTECTOMY WITH CHOLANGIOGRAM;  Surgeon: Loulou Camara MD;  Location:  OR               Physical Exam:   /84   Pulse 57   Ht 1.803 m (5' 11\")   Wt 76.7 kg (169 lb 1.6 oz)   BMI 23.58 kg/m      Gen- well, NAD, A+Ox3, normal color  Lym- no palpable LAD  Abd- soft, nontender.   Extr- hands normal, no HECTOR  Skin- no rash or jaundice  Neuro- no asterixis  Psych- normal mood         Data:   Reviewed in person and significant for:    Lab Results   Component Value Date     " "06/06/2022     06/08/2021      Lab Results   Component Value Date    POTASSIUM 3.7 06/06/2022    POTASSIUM 3.5 06/08/2021     Lab Results   Component Value Date    CHLORIDE 106 06/06/2022    CHLORIDE 107 06/08/2021     Lab Results   Component Value Date    CO2 26 06/06/2022    CO2 28 06/08/2021     Lab Results   Component Value Date    BUN 17 06/06/2022    BUN 18 06/08/2021     Lab Results   Component Value Date    CR 0.95 06/06/2022    CR 0.97 06/08/2021       Lab Results   Component Value Date    WBC 3.7 09/01/2022    WBC 4.7 06/08/2021     Lab Results   Component Value Date    HGB 13.3 09/01/2022    HGB 12.3 06/08/2021     Lab Results   Component Value Date    HCT 37.3 09/01/2022    HCT 35.6 06/08/2021     Lab Results   Component Value Date    MCV 90 09/01/2022    MCV 93 06/08/2021     Lab Results   Component Value Date     09/01/2022     06/08/2021       Lab Results   Component Value Date    AST 21 06/06/2022    AST 17 06/08/2021     Lab Results   Component Value Date    ALT 28 06/06/2022    ALT 25 06/08/2021     No results found for: \"BILICONJ\"   Lab Results   Component Value Date    BILITOTAL 0.6 06/06/2022    BILITOTAL 0.8 06/08/2021       Lab Results   Component Value Date    ALBUMIN 3.9 06/06/2022    ALBUMIN 3.7 06/08/2021     Lab Results   Component Value Date    PROTTOTAL 7.4 06/06/2022    PROTTOTAL 7.1 06/08/2021      Lab Results   Component Value Date    ALKPHOS 59 06/06/2022    ALKPHOS 51 06/08/2021       Lab Results   Component Value Date    INR 1.10 06/06/2022    INR 1.15 06/08/2021       EXAM: US ABDOMEN LIMITED   LOCATION: [facility] MEDICAL [facility]   DATE: 6/13/2024     INDICATION: Chronic Viral Hepatitis B Without Delta-agent (hc)   COMPARISON: 12/18/2023   TECHNIQUE: Limited abdominal ultrasound.     FINDINGS:     GALLBLADDER: Surgically removed.     BILE DUCTS: No intrahepatic biliary ductal dilatation. Common duct mildly enlarged may be related to postcholecystectomy state, The " common duct measures 11 mm.     LIVER: Normal parenchyma with smooth contour. There is a 11 mm x 13 mm x 8 mm peripheral right lobe mildly hyperechoic lesion without color flow. Which remain stable and likely represents a mildly atypical hemangioma versus fatty infiltration     RIGHT KIDNEY: No hydronephrosis. Simple cyst requires no follow-up.     PANCREAS: The visualized portions are normal.     No ascites.

## 2024-06-24 NOTE — NURSING NOTE
"Chief Complaint   Patient presents with    RECHECK     Follow up with Hep B   '  /84   Pulse 57   Ht 1.803 m (5' 11\")   Wt 76.7 kg (169 lb 1.6 oz)   BMI 23.58 kg/m    Vanessa Tellez, Lead CMA  6/24/2024 12:21 PM    "

## 2024-06-26 NOTE — TELEPHONE ENCOUNTER
Called patient with  as xzoops message had not been read. Attempted x 2. Fax # signal upon first attempt. Did not ring to VM on second attempt.    General VM left with Greg.    ROXIE KimN, RN, N  Hepatology Clinic  [facility]  [facility]

## 2024-08-27 ENCOUNTER — TRANSFERRED RECORDS (OUTPATIENT)
Dept: HEALTH INFORMATION MANAGEMENT | Facility: CLINIC | Age: 69
End: 2024-08-27

## 2024-08-28 ENCOUNTER — TRANSFERRED RECORDS (OUTPATIENT)
Dept: HEALTH INFORMATION MANAGEMENT | Facility: CLINIC | Age: 69
End: 2024-08-28

## 2024-11-27 NOTE — TELEPHONE ENCOUNTER
Call transferred to writer. Roberta with therapy was calling to update that resting HR is , reports that pt took her medications around 7am. On speaker phone with patient. She reports that she feels off, sweaty and weak. /80. Patient denies experiencing symptoms of chest pain, SOB, palpitations, dizziness, lightheadedness, or fatigue. HR overnight was 60-70, starting this morning around 7:30 when she got up and moving and getting dressed, noted that HR was getting faster. Will review with Dr. Arredondo and call pt back with his recommendation. She was appreciative of call and verbalized understanding. Will call with new or worsening symptoms.     Called patient with below. Rx sent to pt's preferred pharmacy. Patient appreciative of call and verbalized understanding. Will call with new or worsening symptoms.      Reviewed below with Dr. Arredondo. QUYEN Arredondo:  Increase metoprolol XL to 25 mg and 12.5 mg in pm.   Called patient. She notes she is doing better now. Her heart rates are back down to the 50-70's. She did feel like she was having AF earlier. However she took all her AM meds late today. Discussed delayed med dosing could be trigger. Pt to monitor very closely and call if AF recurrs. Patient verbalized understanding of information discussed.       Cindy from [facility] called [phone] and said that the patient's heart rate is 130 this morning at 8:00 and now it is ranging from .  Patient is feeling dizzy when she walks when she is sitting she is feeling fine.  Patient took medications after 9:00 am.  Please call her back thank you.   LVM notifying patient their appt on 8/24 has been converted to virtual visit. Left clinic number in case they have questions.   Patient calling to see if there is an update. Patient notes that today when up and moving around her heart rates are 140s or higher. When resting it is staying in the 90s. Please return call to patient at [phone]. Thank you.    Roberta with [facility] calling to give update on patient. Call transferred to KATIE Chaney   cardiac precautions/fall precautions

## 2025-06-09 ENCOUNTER — TELEPHONE (OUTPATIENT)
Dept: GASTROENTEROLOGY | Facility: CLINIC | Age: 70
End: 2025-06-09
Payer: COMMERCIAL

## 2025-06-09 NOTE — TELEPHONE ENCOUNTER
Orders sent.  Patient notified via [employer].    Fariha STEELE LPN  Hepatology Clinic    [facility] Call Center    Phone Message    May a detailed message be left on voicemail: yes     Reason for Call: Other: Pt would like the orders for the lab and ultrasound sent to [facility]; fax # for imaging [phone], fax # to labs [phone].      Action Taken: Message routed to:  [facility] ([facility]): Hep    Travel Screening: Not Applicable     Date of Service:

## 2025-06-10 DIAGNOSIS — B18.1 CHRONIC VIRAL HEPATITIS B WITHOUT DELTA AGENT AND WITHOUT COMA (H): Primary | ICD-10-CM

## 2025-06-24 ENCOUNTER — TELEPHONE (OUTPATIENT)
Dept: GASTROENTEROLOGY | Facility: CLINIC | Age: 70
End: 2025-06-24

## 2025-06-24 ENCOUNTER — OFFICE VISIT (OUTPATIENT)
Dept: GASTROENTEROLOGY | Facility: CLINIC | Age: 70
End: 2025-06-24
Attending: PHYSICIAN ASSISTANT
Payer: COMMERCIAL

## 2025-06-24 VITALS
BODY MASS INDEX: 22.68 KG/M2 | OXYGEN SATURATION: 99 % | SYSTOLIC BLOOD PRESSURE: 142 MMHG | HEART RATE: 57 BPM | HEIGHT: 71 IN | DIASTOLIC BLOOD PRESSURE: 85 MMHG | WEIGHT: 162 LBS

## 2025-06-24 DIAGNOSIS — E87.6 HYPOKALEMIA: ICD-10-CM

## 2025-06-24 DIAGNOSIS — R10.33 PERIUMBILICAL ABDOMINAL PAIN: ICD-10-CM

## 2025-06-24 DIAGNOSIS — B18.1 HEPATITIS B, CHRONIC (H): Primary | ICD-10-CM

## 2025-06-24 DIAGNOSIS — B18.1 CHRONIC VIRAL HEPATITIS B WITHOUT DELTA AGENT AND WITHOUT COMA (H): ICD-10-CM

## 2025-06-24 PROCEDURE — G2211 COMPLEX E/M VISIT ADD ON: HCPCS | Performed by: PHYSICIAN ASSISTANT

## 2025-06-24 PROCEDURE — 3077F SYST BP >= 140 MM HG: CPT | Performed by: PHYSICIAN ASSISTANT

## 2025-06-24 PROCEDURE — 1126F AMNT PAIN NOTED NONE PRSNT: CPT | Performed by: PHYSICIAN ASSISTANT

## 2025-06-24 PROCEDURE — 99214 OFFICE O/P EST MOD 30 MIN: CPT | Performed by: PHYSICIAN ASSISTANT

## 2025-06-24 PROCEDURE — 3079F DIAST BP 80-89 MM HG: CPT | Performed by: PHYSICIAN ASSISTANT

## 2025-06-24 PROCEDURE — 99213 OFFICE O/P EST LOW 20 MIN: CPT | Performed by: PHYSICIAN ASSISTANT

## 2025-06-24 RX ORDER — TENOFOVIR DISOPROXIL FUMARATE 300 MG/1
300 TABLET, FILM COATED ORAL DAILY
Qty: 30 TABLET | Refills: 11 | Status: SHIPPED | OUTPATIENT
Start: 2025-06-24

## 2025-06-24 ASSESSMENT — PAIN SCALES - GENERAL: PAINLEVEL_OUTOF10: NO PAIN (0)

## 2025-06-24 NOTE — NURSING NOTE
MRI enterography faxed to [facility]. Patient updated via [employer]. To be done within 4 weeks.    ROXIE KimN, RN, PHN  Hepatology Clinic  [facility]  [facility]

## 2025-06-24 NOTE — TELEPHONE ENCOUNTER
Contacted [facility] to have colonoscopy report faxed.    Report received. Faxed to [facility].    KITTY Kim, RN, PHN  Hepatology Clinic  [facility]  [facility]

## 2025-06-24 NOTE — Clinical Note
Please fax results to [facility] and have patient complete follow up blood work prior to doing the MR enterography. I want to evaluate his urine for very rare complication of tenofovir which can lead to low potassium.   Regards,   Seth Nguyen PA-C

## 2025-06-24 NOTE — NURSING NOTE
"Chief Complaint   Patient presents with    RECHECK     Hepatitis B, chronic (H)   Inflammatory liver disease      BP (!) 142/85 (BP Location: Right arm, Patient Position: Sitting)   Pulse 57   Ht 1.803 m (5' 11\")   Wt 73.5 kg (162 lb)   SpO2 99%   BMI 22.59 kg/m    Wolf Ireland CMA on 6/24/2025 at 11:15 AM    "

## 2025-06-24 NOTE — Clinical Note
please fax MR enterography orders to [facility].  He works at one of the hospitals, I forget which 1 though. Please also obtain outside colonoscopy records from [facility] and let me know when they are received.  Thanks, Seth

## 2025-06-24 NOTE — Clinical Note
6/24/2025      Sadie Olea  [address] [place] [address]      Dear Colleague,    Thank you for referring your patient, Sadie Olea, to the [facility] [place]. Please see a copy of my visit note below.    Hepatology Clinic note  Sadie Olea   Date of Birth 1955         Assessment/plan:   Sadie Olea is a 69 year old male with chronic hepatitis B, e antigen positive and e antibody negative who has been maintained on tenofovir disoproxil/Viread 300 mg daily  for active disease.    HBV DNA:  low HBV DNA viremia   ALT/AST: normal   Fibrosis Assessment: Fibrosis scan: 8/26/2019: F0-F1, 5.9 kilopascals  Liver Function: Low normal platelets     # Chronic Hepatitis B, suppressed:   - Continue tenofovir disoproxil/Viread 300 mg daily   - Renal labs will be continued to monitor for changes during treatment.  - BMP, Hepatic panel, CBC, INR, AFP and HBV DNA in six months     # Hypokalemia:   BMP, phosphorus     # HCC screening  - Lesion noted on MRI to be consistent with hemangioma    # Stephy-umbilical abdominal pain, cramping sensation for the past six months:   - Last colonoscopy reported in [id number].  We do not have these results available for today.  Will obtain outside records from [facility].  Last colonoscopy in 2019 with 1 polyp removal. ADDENDUM on 6/25/2025: Colonoscopy report from 8/27/2024 reviewed.  He was found to have 1 sessile serrated adenoma 3 mm in the transverse colon, internal hemorrhoids without bleeding was normal.  Recall was recommended for 5 years.  - Recent colonoscopy within the past few years is reassuring.  Pain is localized to the periumbilical region and is present more during the day.  Not present when waking up in the morning.   - Move forward with MR enterography to evaluate small bowel  - Discussed referral to general GI.  Declines today.     # Elevated blood pressure readings  - Recommend routine follow up with PCP for optimization as needed     #  Follow-up in clinic in one year{gghepbplan:[id number]}***      Hypokalemia -   Urine     # Colonoscopy results - get outside records from [facility]     # Follow-up in clinic in *** or sooner as needed    Seth Nguyen PA-C   [facility] Hepatology clinic    Total time for E/M services performed on the date of the encounter *** minutes.  This included review of previous: clinic visits, hospital records, lab results, imaging studies, and procedural documentation. Time also includes patient visit, documentation and discussion with other providers.  The findings from this review are summarized in the above note.   The longitudinal plan of care for the diagnosis(es)/condition(s) as documented were addressed during this visit. Due to the added complexity in care, I will continue to support Sadie Olea in the subsequent management and with ongoing continuity of care.    -----------------------------------------------------       HPI:   Sadie Olea is a 69 year old male who presents to clinic today for the following health issues accompanied by {accompanied to visit:162869}:    Hepatitis B  E antigen positive (8/22/2018)  E antibody negative 98/22/2018)   -Diagnosed: 2009  -History: ? Jaundice at age 42   -MRE 2011: normal fibrosis   Fibrosis scan: 8/26/2019: F0-F1, 5.9 kilopascals  -Prior treatments:   Started on tenofovir DF in 2015 due to 10 year HCC risk   Changed to tenofovir AF in May 2019  Changed back to tenofovir DF due to insurance       Patient was last seen by me on ***   No recent hospitalizations or ER visits. No new medications.     - Describes the pain as a crawling sensation, occurring most days, with some days without pain.  - Reports having 2 to 3 bowel movements per day, with no blood or blackened stools.  - Pain is described as discomfort, sometimes cramping, with a severity of 4 to 5 out of 10.  - Pain location is in the lower abdomen, with no upper abdominal pain.  - Pain slightly alleviated  after bowel movements and passing gas.  - No specific foods identified as triggers for the pain.  - No recent changes in appetite, but reports eating brunch and dinner, with a slight weight decrease to 160-162 pounds.  - No recent fevers, sweats, or chills.  - Reports low energy levels but feels okay otherwise.  - Last colonoscopy was in 2023 or 2024, with a previous colonoscopy in 2019 where one polyp was removed.  - Reports low potassium levels in recent blood work.  - Currently taking tenofovir, with no new medications started recently.  - No changes in urination or prostate issues reported.  - Consumes a diet including vegetables like carrots, broccoli, and spinach, and fruits like apples and oranges.  - Drinks smoothies daily, typically in the morning.  - Works from 3:00 PM to 11:30 PM, with dinner around 6:30-7:00 PM, sometimes experiencing discomfort before or after eating.    Hepatitis B:   Lab Results   Component Value Date    HEPBANG Positive, confirmed (A) 12/19/2011    AUSAB Negative 12/19/2011     Lab Results   Component Value Date    HBQRESINST 114 (H) 06/06/2022    HBQRESINST 214 (H) 12/10/2021    HBQRES 95 (A) 06/08/2021    HBQRES 208 (A) 12/29/2020    HBQRES 217 (A) 07/14/2020    HBQRES 313 (A) 01/28/2020    HBQRES 1,735 (A) 07/01/2019     Lab Results   Component Value Date    HBEABY Negative 08/22/2018    HBEAGN Positive (A) 08/22/2018     Recent Labs   Lab Test 06/06/22  1819 12/10/21  1702 06/08/21  1650 02/18/21  1710 02/16/21  0154 12/29/20  1515 07/14/20  1531 01/28/20  1548 07/01/19  1031 02/15/19  1750   ALKPHOS 59 61 51 80 78 58 55 52 51 46   ALT 28 31 25 105* 119* 24 32 25 31 52   AST 21 20 17 41 219* 17 23 19 23 33   BILITOTAL 0.6 0.8 0.8 0.9 0.8 0.8 1.0 0.8 0.9 0.6      Medical hx Surgical hx   Past Medical History:   Diagnosis Date    FHx: cholecystectomy     GERD (gastroesophageal reflux disease)     HBV (hepatitis B virus) infection     Hyperlipidemia     Past Surgical History:  "  Procedure Laterality Date    COLONOSCOPY      GALLBLADDER SURGERY      LAPAROSCOPIC CHOLECYSTECTOMY WITH CHOLANGIOGRAMS N/A 06/16/2021    Procedure: LAPAROSCOPIC CHOLECYSTECTOMY WITH CHOLANGIOGRAM;  Surgeon: Loulou Camara MD;  Location:  OR               Physical Exam:   VS:  There were no vitals taken for this visit.    ***  Gen- well, NAD, A+Ox3, normal color  Lym- no palpable LAD  Abd- soft, nontender   Extr- hands normal, no HECTOR  Skin- no rash or jaundice  Neuro- no asterixis  Psych- normal mood         Data:   Reviewed in person and significant for:    Lab Results   Component Value Date     06/06/2022     06/08/2021      Lab Results   Component Value Date    POTASSIUM 3.7 06/06/2022    POTASSIUM 3.5 06/08/2021     Lab Results   Component Value Date    CHLORIDE 106 06/06/2022    CHLORIDE 107 06/08/2021     Lab Results   Component Value Date    CO2 26 06/06/2022    CO2 28 06/08/2021     Lab Results   Component Value Date    BUN 17 06/06/2022    BUN 18 06/08/2021     Lab Results   Component Value Date    CR 0.95 06/06/2022    CR 0.97 06/08/2021       Lab Results   Component Value Date    WBC 3.7 09/01/2022    WBC 4.7 06/08/2021     Lab Results   Component Value Date    HGB 13.3 09/01/2022    HGB 12.3 06/08/2021     Lab Results   Component Value Date    HCT 37.3 09/01/2022    HCT 35.6 06/08/2021     Lab Results   Component Value Date    MCV 90 09/01/2022    MCV 93 06/08/2021     Lab Results   Component Value Date     09/01/2022     06/08/2021       Lab Results   Component Value Date    AST 21 06/06/2022    AST 17 06/08/2021     Lab Results   Component Value Date    ALT 28 06/06/2022    ALT 25 06/08/2021     No results found for: \"BILICONJ\"   Lab Results   Component Value Date    BILITOTAL 0.6 06/06/2022    BILITOTAL 0.8 06/08/2021       Lab Results   Component Value Date    ALBUMIN 3.9 06/06/2022    ALBUMIN 3.7 06/08/2021     Lab Results   Component Value Date    PROTTOTAL 7.4 " 06/06/2022    PROTTOTAL 7.1 06/08/2021      Lab Results   Component Value Date    ALKPHOS 59 06/06/2022    ALKPHOS 51 06/08/2021       Lab Results   Component Value Date    INR 1.10 06/06/2022    INR 1.15 06/08/2021       EXAM: US ABDOMEN COMPLETE   LOCATION: [facility] MEDICAL IMAGING   DATE: 6/16/2025     INDICATION: US ABDOMEN COMPLETE / HCC SURVEILLANCE   COMPARISON: 12/24/2024, and prior   TECHNIQUE: Complete abdominal ultrasound.     FINDINGS:     GALLBLADDER: Gallstones in an otherwise normal gallbladder. No wall thickening, or pericholecystic fluid. Negative sonographic Rincon's sign.     BILE DUCTS: No biliary dilatation. The common duct measures 12 mm.     LIVER: Normal parenchyma with smooth contour. There is a 1.4 x 1.3 cm echogenic lesion right lobe of the liver (image 29).     RIGHT KIDNEY: Normal size. Normal echogenicity with no hydronephrosis or mass. Benign renal cysts warrant no further follow-up.     LEFT KIDNEY: Normal size. Normal echogenicity with no hydronephrosis or mass. Benign renal cysts warrant no further follow-up.     SPLEEN: Normal.     PANCREAS: The visualized portions are normal.     AORTA: Normal in caliber.     IVC: Normal where visualized.     No ascites.       ---------------------------------------------------------------------------  EXAM: US ABDOMEN COMPLETE  LOCATION: [facility] MEDICAL IMAGING  DATE: 6/16/2025    INDICATION: US ABDOMEN COMPLETE / HCC SURVEILLANCE  COMPARISON: 12/24/2024, and prior  TECHNIQUE: Complete abdominal ultrasound.    FINDINGS:    GALLBLADDER: Gallstones in an otherwise normal gallbladder. No wall thickening, or pericholecystic fluid. Negative sonographic Rincon's sign.    BILE DUCTS: No biliary dilatation. The common duct measures 12 mm.    LIVER: Normal parenchyma with smooth contour. There is a 1.4 x 1.3 cm echogenic lesion right lobe of the liver (image 29).    RIGHT KIDNEY: Normal size. Normal echogenicity with no hydronephrosis or mass. Benign renal cysts  warrant no further follow-up.    LEFT KIDNEY: Normal size. Normal echogenicity with no hydronephrosis or mass. Benign renal cysts warrant no further follow-up.    SPLEEN: Normal.    PANCREAS: The visualized portions are normal.    AORTA: Normal in caliber.    IVC: Normal where visualized.    No ascites.  Procedure Note    Chapin Wells MD - 06/17/2025  Formatting of this note might be different from the original.  For Patients: As a result of the 21st Century Cures Act, medical imaging exams and procedure reports are released immediately into your electronic medical record. You may view this report before your referring provider. If you have questions, please contact your health care provider.    EXAM: US ABDOMEN COMPLETE  LOCATION: [facility] MEDICAL IMAGING  DATE: 6/16/2025    INDICATION: US ABDOMEN COMPLETE / HCC SURVEILLANCE  COMPARISON: 12/24/2024, and prior  TECHNIQUE: Complete abdominal ultrasound.    FINDINGS:    GALLBLADDER: Gallstones in an otherwise normal gallbladder. No wall thickening, or pericholecystic fluid. Negative sonographic Rincon's sign.    BILE DUCTS: No biliary dilatation. The common duct measures 12 mm.    LIVER: Normal parenchyma with smooth contour. There is a 1.4 x 1.3 cm echogenic lesion right lobe of the liver (image 29).    RIGHT KIDNEY: Normal size. Normal echogenicity with no hydronephrosis or mass. Benign renal cysts warrant no further follow-up.    LEFT KIDNEY: Normal size. Normal echogenicity with no hydronephrosis or mass. Benign renal cysts warrant no further follow-up.    SPLEEN: Normal.    PANCREAS: The visualized portions are normal.    AORTA: Normal in caliber.    IVC: Normal where visualized.    No ascites.    IMPRESSION:  1.  No acute abnormalities.  2.  1.4 cm echogenic lesion in the right lobe liver, possibly hemangioma or area of focal steatosis, unchanged across multiple prior ultrasounds        MR Elastography without Contrast  Order: [id number]  Impression    1.  No  convincing MR correlate to the hyperechoic lesion previously seen sonographically in the right hepatic lobe, potentially obscured by susceptibility artifact near the gallbladder fossa. Continued sonographic follow-up in 6-12 months is recommended.    US Abdomen Limited 6-12 Months  Narrative    For Patients: As a result of the 21st Century Cures Act, medical imaging exams and procedure reports are released immediately into your electronic medical record. You may view this report before your referring provider. If you have questions, please contact your health care provider.    EXAM: MR ABDOMEN LIVER WWO  LOCATION: [facility] MEDICAL IMAGING  DATE: 7/30/2024    INDICATION: Chronic Viral Hepatitis B Without Delta Agent And Without Coma (hc)  COMPARISON: MR 7/27/2022; ultrasound 6/13/2024, 12/18/2023  TECHNIQUE: Routine MRI liver protocol including T1 in/out phase, diffusion, multiplane T2, and dynamic T1 with IV contrast.    CONTRAST: GADOTERATE MEGLUMINE 0.5 MMOL/ML IV SOLN 15 ML VIAL: 15mL    FINDINGS:    LIVER: Noncirrhotic liver morphology. Small cyst in the left hepatic lobe. No convincing MR correlate to the hyperechoic lesion previously seen sonographically in the right hepatic lobe. Susceptibility artifact near the gallbladder fossa should be related to surgical clips. No evidence of cirrhosis or significant fat or iron deposition.    ADDITIONAL FINDINGS: . Mild prominence of the intrahepatic bile ducts and dilated extrahepatic bile duct up to 11 mm which should represent reservoir effect post cholecystectomy, similar to prior. Bilateral renal cysts including a cyst with intrinsic T- hyperintense signal on the right suggesting hemorrhagic or proteinaceous contents. These require no dedicated follow-up. Focal cortical scarring involving the interpolar left kidney laterally. Stable 5 mm nodule in the right lower lobe (series 216, image 12).      MR Elastography without Contrast  Order: [id number]  Impression    1.  No  convincing MR correlate to the hyperechoic lesion previously seen sonographically in the right hepatic lobe, potentially obscured by susceptibility artifact near the gallbladder fossa. Continued sonographic follow-up in 6-12 months is recommended.    US Abdomen Limited 6-12 Months  Narrative    For Patients: As a result of the 21st Century Cures Act, medical imaging exams and procedure reports are released immediately into your electronic medical record. You may view this report before your referring provider. If you have questions, please contact your health care provider.    EXAM: MR ABDOMEN LIVER WWO  LOCATION: [facility] MEDICAL IMAGING  DATE: 7/30/2024    INDICATION: Chronic Viral Hepatitis B Without Delta Agent And Without Coma (hc)  COMPARISON: MR 7/27/2022; ultrasound 6/13/2024, 12/18/2023  TECHNIQUE: Routine MRI liver protocol including T1 in/out phase, diffusion, multiplane T2, and dynamic T1 with IV contrast.    CONTRAST: GADOTERATE MEGLUMINE 0.5 MMOL/ML IV SOLN 15 ML VIAL: 15mL    FINDINGS:    LIVER: Noncirrhotic liver morphology. Small cyst in the left hepatic lobe. No convincing MR correlate to the hyperechoic lesion previously seen sonographically in the right hepatic lobe. Susceptibility artifact near the gallbladder fossa should be related to surgical clips. No evidence of cirrhosis or significant fat or iron deposition.    ADDITIONAL FINDINGS: . Mild prominence of the intrahepatic bile ducts and dilated extrahepatic bile duct up to 11 mm which should represent reservoir effect post cholecystectomy, similar to prior. Bilateral renal cysts including a cyst with intrinsic T- hyperintense signal on the right suggesting hemorrhagic or proteinaceous contents. These require no dedicated follow-up. Focal cortical scarring involving the interpolar left kidney laterally. Stable 5 mm nodule in the right lower lobe (series 216, image 12).      Again, thank you for allowing me to participate in the care of your  patient.        Sincerely,        Seth Nguyen PA-C    Electronically signed

## 2025-06-24 NOTE — PROGRESS NOTES
Hepatology Clinic note  Sadie Olea   Date of Birth 1955         Assessment/plan:   Sadie Olea is a 69 year old male with chronic hepatitis B, e antigen positive and e antibody negative who has been maintained on tenofovir disoproxil/Viread 300 mg daily  for active disease.    HBV DNA:  low HBV DNA viremia   ALT/AST: normal   Fibrosis Assessment: Fibrosis scan: 8/26/2019: F0-F1, 5.9 kilopascals  Liver Function: Low normal platelets     # Chronic Hepatitis B, suppressed:   - Continue tenofovir disoproxil/Viread 300 mg daily   - Renal labs will be continued to monitor for changes during treatment.  - BMP, Hepatic panel, CBC, INR, AFP and HBV DNA in six months     # Hypokalemia:   BMP, phosphorus     # HCC screening  - Lesion noted on MRI to be consistent with hemangioma    # Stephy-umbilical abdominal pain, cramping sensation for the past six months:   - Last colonoscopy reported in [id number].  We do not have these results available for today.  Will obtain outside records from [facility].  Last colonoscopy in 2019 with 1 polyp removal. ADDENDUM on 6/25/2025: Colonoscopy report from 8/27/2024 reviewed.  He was found to have 1 sessile serrated adenoma 3 mm in the transverse colon, internal hemorrhoids without bleeding was normal.  Recall was recommended for 5 years.  - Recent colonoscopy within the past few years is reassuring.  Pain is localized to the periumbilical region and is present more during the day.  Not present when waking up in the morning.   - Move forward with MR enterography to evaluate small bowel  - Discussed referral to general GI.  Declines today.     # Elevated blood pressure readings  - Recommend routine follow up with PCP for optimization as needed     # Follow-up in clinic in one year{ggheplan:[id number]}***      Hypokalemia -   Urine     # Colonoscopy results - get outside records from [facility]     # Follow-up in clinic in *** or sooner as needed    Seth Nguyen PA-C   [facility]  Hepatology clinic    Total time for E/M services performed on the date of the encounter *** minutes.  This included review of previous: clinic visits, hospital records, lab results, imaging studies, and procedural documentation. Time also includes patient visit, documentation and discussion with other providers.  The findings from this review are summarized in the above note.   The longitudinal plan of care for the diagnosis(es)/condition(s) as documented were addressed during this visit. Due to the added complexity in care, I will continue to support Sadie Olea in the subsequent management and with ongoing continuity of care.    -----------------------------------------------------       HPI:   Sadie Olea is a 69 year old male who presents to clinic today for the following health issues accompanied by {accompanied to visit:669147}:    Hepatitis B  E antigen positive (8/22/2018)  E antibody negative 98/22/2018)   -Diagnosed: 2009  -History: ? Jaundice at age 42   -MRE 2011: normal fibrosis   Fibrosis scan: 8/26/2019: F0-F1, 5.9 kilopascals  -Prior treatments:   Started on tenofovir DF in 2015 due to 10 year HCC risk   Changed to tenofovir AF in May 2019  Changed back to tenofovir DF due to insurance       Patient was last seen by me on ***   No recent hospitalizations or ER visits. No new medications.     - Describes the pain as a crawling sensation, occurring most days, with some days without pain.  - Reports having 2 to 3 bowel movements per day, with no blood or blackened stools.  - Pain is described as discomfort, sometimes cramping, with a severity of 4 to 5 out of 10.  - Pain location is in the lower abdomen, with no upper abdominal pain.  - Pain slightly alleviated after bowel movements and passing gas.  - No specific foods identified as triggers for the pain.  - No recent changes in appetite, but reports eating brunch and dinner, with a slight weight decrease to 160-162 pounds.  - No recent fevers,  sweats, or chills.  - Reports low energy levels but feels okay otherwise.  - Last colonoscopy was in 2023 or 2024, with a previous colonoscopy in 2019 where one polyp was removed.  - Reports low potassium levels in recent blood work.  - Currently taking tenofovir, with no new medications started recently.  - No changes in urination or prostate issues reported.  - Consumes a diet including vegetables like carrots, broccoli, and spinach, and fruits like apples and oranges.  - Drinks smoothies daily, typically in the morning.  - Works from 3:00 PM to 11:30 PM, with dinner around 6:30-7:00 PM, sometimes experiencing discomfort before or after eating.    Hepatitis B:   Lab Results   Component Value Date    HEPBANG Positive, confirmed (A) 12/19/2011    AUSAB Negative 12/19/2011     Lab Results   Component Value Date    HBQRESINST 114 (H) 06/06/2022    HBQRESINST 214 (H) 12/10/2021    HBQRES 95 (A) 06/08/2021    HBQRES 208 (A) 12/29/2020    HBQRES 217 (A) 07/14/2020    HBQRES 313 (A) 01/28/2020    HBQRES 1,735 (A) 07/01/2019     Lab Results   Component Value Date    HBEABY Negative 08/22/2018    HBEAGN Positive (A) 08/22/2018     Recent Labs   Lab Test 06/06/22  1819 12/10/21  1702 06/08/21  1650 02/18/21  1710 02/16/21  0154 12/29/20  1515 07/14/20  1531 01/28/20  1548 07/01/19  1031 02/15/19  1750   ALKPHOS 59 61 51 80 78 58 55 52 51 46   ALT 28 31 25 105* 119* 24 32 25 31 52   AST 21 20 17 41 219* 17 23 19 23 33   BILITOTAL 0.6 0.8 0.8 0.9 0.8 0.8 1.0 0.8 0.9 0.6      Medical hx Surgical hx   Past Medical History:   Diagnosis Date    FHx: cholecystectomy     GERD (gastroesophageal reflux disease)     HBV (hepatitis B virus) infection     Hyperlipidemia     Past Surgical History:   Procedure Laterality Date    COLONOSCOPY      GALLBLADDER SURGERY      LAPAROSCOPIC CHOLECYSTECTOMY WITH CHOLANGIOGRAMS N/A 06/16/2021    Procedure: LAPAROSCOPIC CHOLECYSTECTOMY WITH CHOLANGIOGRAM;  Surgeon: Loulou Camara MD;  Location:  "[facility] OR               Physical Exam:   VS:  There were no vitals taken for this visit.    ***  Gen- well, NAD, A+Ox3, normal color  Lym- no palpable LAD  Abd- soft, nontender   Extr- hands normal, no HECTOR  Skin- no rash or jaundice  Neuro- no asterixis  Psych- normal mood         Data:   Reviewed in person and significant for:    Lab Results   Component Value Date     06/06/2022     06/08/2021      Lab Results   Component Value Date    POTASSIUM 3.7 06/06/2022    POTASSIUM 3.5 06/08/2021     Lab Results   Component Value Date    CHLORIDE 106 06/06/2022    CHLORIDE 107 06/08/2021     Lab Results   Component Value Date    CO2 26 06/06/2022    CO2 28 06/08/2021     Lab Results   Component Value Date    BUN 17 06/06/2022    BUN 18 06/08/2021     Lab Results   Component Value Date    CR 0.95 06/06/2022    CR 0.97 06/08/2021       Lab Results   Component Value Date    WBC 3.7 09/01/2022    WBC 4.7 06/08/2021     Lab Results   Component Value Date    HGB 13.3 09/01/2022    HGB 12.3 06/08/2021     Lab Results   Component Value Date    HCT 37.3 09/01/2022    HCT 35.6 06/08/2021     Lab Results   Component Value Date    MCV 90 09/01/2022    MCV 93 06/08/2021     Lab Results   Component Value Date     09/01/2022     06/08/2021       Lab Results   Component Value Date    AST 21 06/06/2022    AST 17 06/08/2021     Lab Results   Component Value Date    ALT 28 06/06/2022    ALT 25 06/08/2021     No results found for: \"BILICONJ\"   Lab Results   Component Value Date    BILITOTAL 0.6 06/06/2022    BILITOTAL 0.8 06/08/2021       Lab Results   Component Value Date    ALBUMIN 3.9 06/06/2022    ALBUMIN 3.7 06/08/2021     Lab Results   Component Value Date    PROTTOTAL 7.4 06/06/2022    PROTTOTAL 7.1 06/08/2021      Lab Results   Component Value Date    ALKPHOS 59 06/06/2022    ALKPHOS 51 06/08/2021       Lab Results   Component Value Date    INR 1.10 06/06/2022    INR 1.15 06/08/2021       EXAM: US ABDOMEN COMPLETE "   LOCATION: [facility] MEDICAL IMAGING   DATE: 6/16/2025     INDICATION: US ABDOMEN COMPLETE / HCC SURVEILLANCE   COMPARISON: 12/24/2024, and prior   TECHNIQUE: Complete abdominal ultrasound.     FINDINGS:     GALLBLADDER: Gallstones in an otherwise normal gallbladder. No wall thickening, or pericholecystic fluid. Negative sonographic Rincon's sign.     BILE DUCTS: No biliary dilatation. The common duct measures 12 mm.     LIVER: Normal parenchyma with smooth contour. There is a 1.4 x 1.3 cm echogenic lesion right lobe of the liver (image 29).     RIGHT KIDNEY: Normal size. Normal echogenicity with no hydronephrosis or mass. Benign renal cysts warrant no further follow-up.     LEFT KIDNEY: Normal size. Normal echogenicity with no hydronephrosis or mass. Benign renal cysts warrant no further follow-up.     SPLEEN: Normal.     PANCREAS: The visualized portions are normal.     AORTA: Normal in caliber.     IVC: Normal where visualized.     No ascites.       ---------------------------------------------------------------------------  EXAM: US ABDOMEN COMPLETE  LOCATION: [facility] MEDICAL IMAGING  DATE: 6/16/2025    INDICATION: US ABDOMEN COMPLETE / HCC SURVEILLANCE  COMPARISON: 12/24/2024, and prior  TECHNIQUE: Complete abdominal ultrasound.    FINDINGS:    GALLBLADDER: Gallstones in an otherwise normal gallbladder. No wall thickening, or pericholecystic fluid. Negative sonographic Rincon's sign.    BILE DUCTS: No biliary dilatation. The common duct measures 12 mm.    LIVER: Normal parenchyma with smooth contour. There is a 1.4 x 1.3 cm echogenic lesion right lobe of the liver (image 29).    RIGHT KIDNEY: Normal size. Normal echogenicity with no hydronephrosis or mass. Benign renal cysts warrant no further follow-up.    LEFT KIDNEY: Normal size. Normal echogenicity with no hydronephrosis or mass. Benign renal cysts warrant no further follow-up.    SPLEEN: Normal.    PANCREAS: The visualized portions are normal.    AORTA: Normal in  caliber.    IVC: Normal where visualized.    No ascites.  Procedure Note    Chapin Wells MD - 06/17/2025  Formatting of this note might be different from the original.  For Patients: As a result of the 21st Century Cures Act, medical imaging exams and procedure reports are released immediately into your electronic medical record. You may view this report before your referring provider. If you have questions, please contact your health care provider.    EXAM: US ABDOMEN COMPLETE  LOCATION: [facility] MEDICAL IMAGING  DATE: 6/16/2025    INDICATION: US ABDOMEN COMPLETE / HCC SURVEILLANCE  COMPARISON: 12/24/2024, and prior  TECHNIQUE: Complete abdominal ultrasound.    FINDINGS:    GALLBLADDER: Gallstones in an otherwise normal gallbladder. No wall thickening, or pericholecystic fluid. Negative sonographic Rincon's sign.    BILE DUCTS: No biliary dilatation. The common duct measures 12 mm.    LIVER: Normal parenchyma with smooth contour. There is a 1.4 x 1.3 cm echogenic lesion right lobe of the liver (image 29).    RIGHT KIDNEY: Normal size. Normal echogenicity with no hydronephrosis or mass. Benign renal cysts warrant no further follow-up.    LEFT KIDNEY: Normal size. Normal echogenicity with no hydronephrosis or mass. Benign renal cysts warrant no further follow-up.    SPLEEN: Normal.    PANCREAS: The visualized portions are normal.    AORTA: Normal in caliber.    IVC: Normal where visualized.    No ascites.    IMPRESSION:  1.  No acute abnormalities.  2.  1.4 cm echogenic lesion in the right lobe liver, possibly hemangioma or area of focal steatosis, unchanged across multiple prior ultrasounds        MR Elastography without Contrast  Order: [id number]  Impression    1.  No convincing MR correlate to the hyperechoic lesion previously seen sonographically in the right hepatic lobe, potentially obscured by susceptibility artifact near the gallbladder fossa. Continued sonographic follow-up in 6-12 months is  recommended.    US Abdomen Limited 6-12 Months  Narrative    For Patients: As a result of the 21st Century Cures Act, medical imaging exams and procedure reports are released immediately into your electronic medical record. You may view this report before your referring provider. If you have questions, please contact your health care provider.    EXAM: MR ABDOMEN LIVER WWO  LOCATION: [facility] MEDICAL IMAGING  DATE: 7/30/2024    INDICATION: Chronic Viral Hepatitis B Without Delta Agent And Without Coma (hc)  COMPARISON: MR 7/27/2022; ultrasound 6/13/2024, 12/18/2023  TECHNIQUE: Routine MRI liver protocol including T1 in/out phase, diffusion, multiplane T2, and dynamic T1 with IV contrast.    CONTRAST: GADOTERATE MEGLUMINE 0.5 MMOL/ML IV SOLN 15 ML VIAL: 15mL    FINDINGS:    LIVER: Noncirrhotic liver morphology. Small cyst in the left hepatic lobe. No convincing MR correlate to the hyperechoic lesion previously seen sonographically in the right hepatic lobe. Susceptibility artifact near the gallbladder fossa should be related to surgical clips. No evidence of cirrhosis or significant fat or iron deposition.    ADDITIONAL FINDINGS: . Mild prominence of the intrahepatic bile ducts and dilated extrahepatic bile duct up to 11 mm which should represent reservoir effect post cholecystectomy, similar to prior. Bilateral renal cysts including a cyst with intrinsic T- hyperintense signal on the right suggesting hemorrhagic or proteinaceous contents. These require no dedicated follow-up. Focal cortical scarring involving the interpolar left kidney laterally. Stable 5 mm nodule in the right lower lobe (series 216, image 12).      MR Elastography without Contrast  Order: [id number]  Impression    1.  No convincing MR correlate to the hyperechoic lesion previously seen sonographically in the right hepatic lobe, potentially obscured by susceptibility artifact near the gallbladder fossa. Continued sonographic follow-up in 6-12 months is  recommended.    US Abdomen Limited 6-12 Months  Narrative    For Patients: As a result of the 21st Century Cures Act, medical imaging exams and procedure reports are released immediately into your electronic medical record. You may view this report before your referring provider. If you have questions, please contact your health care provider.    EXAM: MR ABDOMEN LIVER WWO  LOC[facility]: [facility] MEDICAL IMAG[facility]  DATE: 7/30/2024    INDICATION: Chronic Viral Hepatitis B Without Delta Agent And Without Coma (hc)  COMPARISON: MR 7/27/2022; ultrasound 6/13/2024, 12/18/2023  TECHNIQUE: Routine MRI liver protocol including T1 in/out phase, diffusion, multiplane T2, and dynamic T1 with IV contrast.    CONTRAST: GADOTERATE MEGLUMINE 0.5 MMOL/ML IV SOLN 15 ML VIAL: 15mL    FINDINGS:    LIVER: Noncirrhotic liver morphology. Small cyst in the left hepatic lobe. No convincing MR correlate to the hyperechoic lesion previously seen sonographically in the right hepatic lobe. Susceptibility artifact near the gallbladder fossa should be related to surgical clips. No evidence of cirrhosis or significant fat or iron deposition.    ADDITIONAL FINDINGS: . Mild prominence of the intrahepatic bile ducts and dilated extrahepatic bile duct up to 11 mm which should represent reservoir effect post cholecystectomy, similar to prior. Bilateral renal cysts including a cyst with intrinsic T- hyperintense signal on the right suggesting hemorrhagic or proteinaceous contents. These require no dedicated follow-up. Focal cortical scarring involving the interpolar left kidney laterally. Stable 5 mm nodule in the right lower lobe (series 216, image 12).

## 2025-06-25 PROBLEM — D12.6 ADENOMATOUS POLYP OF COLON: Status: ACTIVE | Noted: 2025-06-25

## 2025-07-03 NOTE — NURSING NOTE
Follow up blood and urine testing faxed to [facility] To be completed prior to MRI enterography. [employer] message sent to patient.    ROXIE KimN, RN, PHN  Hepatology Clinic  [facility]  [facility]

## 2025-08-23 ENCOUNTER — HEALTH MAINTENANCE LETTER (OUTPATIENT)
Age: 70
End: 2025-08-23

## (undated) DEVICE — Device

## (undated) DEVICE — CONNECTOR STOPCOCK 3 WAY MALE LL HI-FLO MX9311L

## (undated) DEVICE — TUBING IV EXTENSION SET ANESTHESIA 34" MLL 2C6227

## (undated) DEVICE — SOL NACL 0.9% INJ 250ML BAG 2B1322Q

## (undated) DEVICE — ENDO POUCH UNIV RETRIEVAL SYSTEM INZII 10MM CD001

## (undated) DEVICE — LINEN FULL SHEET 5511

## (undated) DEVICE — SOL NACL 0.9% IRRIG 3000ML BAG 2B7477

## (undated) DEVICE — LINEN HALF SHEET 5512

## (undated) DEVICE — SUCTION IRR STRYKERFLOW II W/TIP 250-070-520

## (undated) DEVICE — RAD RX ISOVUE 300 (50ML) 61% IOPAMIDOL CHARGE PER ML

## (undated) DEVICE — ESU CORD MONOPOLAR 10'  E0510

## (undated) DEVICE — SU VICRYL 4-0 PS-2 18" UND J496H

## (undated) DEVICE — ESU ELEC BLADE 2.75" COATED/INSULATED E1455

## (undated) DEVICE — LINEN TOWEL PACK X10 5473

## (undated) DEVICE — SUCTION LAPAROSCOPIC SMOKE EVAC SYSTEM SEL7000A

## (undated) DEVICE — DRAPE LEGGINGS 8421

## (undated) DEVICE — CLIP APPLIER ENDO 5MM M/L LIGAMAX EL5ML

## (undated) DEVICE — ESU GROUND PAD ADULT W/CORD E7507

## (undated) DEVICE — GLOVE PROTEXIS BLUE W/NEU-THERA 6.5  2D73EB65

## (undated) DEVICE — ENDO TROCAR SLEEVE KII Z-THREADED 05X100MM CTS02

## (undated) DEVICE — SU VICRYL 0 UR-6 27" J603H

## (undated) DEVICE — ENDO TROCAR FIRST ENTRY KII FIOS Z-THRD 05X100MM CTF03

## (undated) DEVICE — BAG CLEAR TRASH 1.3M 39X33" P4040C

## (undated) DEVICE — LINEN POUCH DBL 5427

## (undated) DEVICE — DRAPE X-RAY TUBE 00-901169-01-OEC

## (undated) DEVICE — ESU PENCIL W/HOLSTER E2350H

## (undated) DEVICE — SURGICEL HEMOSTAT 2X14" 1951

## (undated) DEVICE — GLOVE PROTEXIS W/NEU-THERA 6.5  2D73TE65

## (undated) DEVICE — DECANTER BAG 2002S

## (undated) DEVICE — SYR 30ML LL W/O NDL 302832

## (undated) DEVICE — ENDO TROCAR BLUNT TIP KII BALLOON 12X100MM C0R47

## (undated) DEVICE — CATH CHOLANGIOGRAM KUMAR CC-019

## (undated) DEVICE — SUCTION CANISTER MEDIVAC LINER 3000ML W/LID 65651-530

## (undated) RX ORDER — ONDANSETRON 2 MG/ML
INJECTION INTRAMUSCULAR; INTRAVENOUS
Status: DISPENSED
Start: 2021-06-16

## (undated) RX ORDER — FENTANYL CITRATE 50 UG/ML
INJECTION, SOLUTION INTRAMUSCULAR; INTRAVENOUS
Status: DISPENSED
Start: 2021-06-16

## (undated) RX ORDER — BUPIVACAINE HYDROCHLORIDE 5 MG/ML
INJECTION, SOLUTION EPIDURAL; INTRACAUDAL
Status: DISPENSED
Start: 2021-06-16

## (undated) RX ORDER — NEOSTIGMINE METHYLSULFATE 1 MG/ML
VIAL (ML) INJECTION
Status: DISPENSED
Start: 2021-06-16

## (undated) RX ORDER — FENTANYL CITRATE-0.9 % NACL/PF 10 MCG/ML
PLASTIC BAG, INJECTION (ML) INTRAVENOUS
Status: DISPENSED
Start: 2021-06-16

## (undated) RX ORDER — ACETAMINOPHEN 325 MG/1
TABLET ORAL
Status: DISPENSED
Start: 2021-06-16

## (undated) RX ORDER — GLYCOPYRROLATE 0.2 MG/ML
INJECTION INTRAMUSCULAR; INTRAVENOUS
Status: DISPENSED
Start: 2021-06-16

## (undated) RX ORDER — KETOROLAC TROMETHAMINE 30 MG/ML
INJECTION, SOLUTION INTRAMUSCULAR; INTRAVENOUS
Status: DISPENSED
Start: 2021-06-16

## (undated) RX ORDER — OXYCODONE HYDROCHLORIDE 5 MG/1
TABLET ORAL
Status: DISPENSED
Start: 2021-06-16

## (undated) RX ORDER — CEFAZOLIN SODIUM 2 G/100ML
INJECTION, SOLUTION INTRAVENOUS
Status: DISPENSED
Start: 2021-06-16